# Patient Record
Sex: MALE | Race: WHITE | NOT HISPANIC OR LATINO | Employment: FULL TIME | URBAN - METROPOLITAN AREA
[De-identification: names, ages, dates, MRNs, and addresses within clinical notes are randomized per-mention and may not be internally consistent; named-entity substitution may affect disease eponyms.]

---

## 2017-07-05 ENCOUNTER — ALLSCRIPTS OFFICE VISIT (OUTPATIENT)
Dept: OTHER | Facility: OTHER | Age: 37
End: 2017-07-05

## 2017-07-31 ENCOUNTER — ALLSCRIPTS OFFICE VISIT (OUTPATIENT)
Dept: OTHER | Facility: OTHER | Age: 37
End: 2017-07-31

## 2017-08-28 ENCOUNTER — ALLSCRIPTS OFFICE VISIT (OUTPATIENT)
Dept: OTHER | Facility: OTHER | Age: 37
End: 2017-08-28

## 2017-09-22 ENCOUNTER — GENERIC CONVERSION - ENCOUNTER (OUTPATIENT)
Dept: OTHER | Facility: OTHER | Age: 37
End: 2017-09-22

## 2017-09-22 DIAGNOSIS — M54.12 RADICULOPATHY OF CERVICAL REGION: ICD-10-CM

## 2017-10-05 ENCOUNTER — GENERIC CONVERSION - ENCOUNTER (OUTPATIENT)
Dept: OTHER | Facility: OTHER | Age: 37
End: 2017-10-05

## 2017-10-12 ENCOUNTER — APPOINTMENT (OUTPATIENT)
Dept: PHYSICAL THERAPY | Age: 37
End: 2017-10-12
Payer: COMMERCIAL

## 2017-10-12 ENCOUNTER — GENERIC CONVERSION - ENCOUNTER (OUTPATIENT)
Dept: OTHER | Facility: OTHER | Age: 37
End: 2017-10-12

## 2017-10-12 DIAGNOSIS — M54.12 RADICULOPATHY OF CERVICAL REGION: ICD-10-CM

## 2017-10-12 PROCEDURE — G8978 MOBILITY CURRENT STATUS: HCPCS

## 2017-10-12 PROCEDURE — G8979 MOBILITY GOAL STATUS: HCPCS

## 2017-10-12 PROCEDURE — 97140 MANUAL THERAPY 1/> REGIONS: CPT

## 2017-10-12 PROCEDURE — 97162 PT EVAL MOD COMPLEX 30 MIN: CPT

## 2017-10-12 PROCEDURE — 97012 MECHANICAL TRACTION THERAPY: CPT

## 2017-10-16 ENCOUNTER — GENERIC CONVERSION - ENCOUNTER (OUTPATIENT)
Dept: OTHER | Facility: OTHER | Age: 37
End: 2017-10-16

## 2017-11-14 ENCOUNTER — GENERIC CONVERSION - ENCOUNTER (OUTPATIENT)
Dept: OTHER | Facility: OTHER | Age: 37
End: 2017-11-14

## 2017-11-30 ENCOUNTER — ALLSCRIPTS OFFICE VISIT (OUTPATIENT)
Dept: OTHER | Facility: OTHER | Age: 37
End: 2017-11-30

## 2018-01-08 ENCOUNTER — ALLSCRIPTS OFFICE VISIT (OUTPATIENT)
Dept: OTHER | Facility: OTHER | Age: 38
End: 2018-01-08

## 2018-01-08 ENCOUNTER — TRANSCRIBE ORDERS (OUTPATIENT)
Dept: ADMINISTRATIVE | Facility: HOSPITAL | Age: 38
End: 2018-01-08

## 2018-01-08 DIAGNOSIS — M54.12 BRACHIAL NEURITIS: Primary | ICD-10-CM

## 2018-01-09 ENCOUNTER — TRANSCRIBE ORDERS (OUTPATIENT)
Dept: ADMINISTRATIVE | Facility: HOSPITAL | Age: 38
End: 2018-01-09

## 2018-01-09 DIAGNOSIS — M77.12 LEFT LATERAL EPICONDYLITIS: Primary | ICD-10-CM

## 2018-01-09 NOTE — PROGRESS NOTES
Assessment   1  Cervical radiculopathy (723 4) (M54 12)   2  Left lateral epicondylitis (726 32) (M77 12)    Plan   Cervical radiculopathy    · * MRI CERVICAL SPINE WO CONTRAST; Status:Need Information - Financial    Authorization; Requested [id number];    · *1 -  [facility]  *  Status: Active  Requested for:    70CYS3471  Care Summary provided  : Yes  Left lateral epicondylitis    · 1 - Artem SLATER, [place] Deluna  (Orthopedic Surgery) Co-Management  *  Status: Active     Requested for: 25QZI7444  Care Summary provided  : Yes   · Follow Up if Not Better Evaluation and Treatment  Follow-up  Status: Complete  Done:    24MXM9201 04:14PM    Chief Complaint   pain in left elbow not getting better      History of Present Illness   Pt  is here for left elbow pain  Patient with history of the cervical degenerative disc disease at C5-6  Right arm pain has improved  Patient pain worse with gripping things  No chest pain or shortness of breath  Patient use meloxicam and piroxicam without significant improved  patient did have x-ray at chiropractor for cervical spine  Review of Systems        Constitutional: No fever or chills, feels well, no tiredness, no recent weight gain or weight loss  Eyes: No complaints of eye pain, no red eyes, no discharge from eyes, no itchy eyes  ENT: no complaints of earache, no hearing loss, no nosebleeds, no nasal discharge, no sore throat, no hoarseness  Cardiovascular: No complaints of slow heart rate, no fast heart rate, no chest pain, no palpitations, no leg claudication, no lower extremity  Respiratory: No complaints of shortness of breath, no wheezing, no cough, no SOB on exertion, no orthopnea or PND  Gastrointestinal: No complaints of abdominal pain, no constipation, no nausea or vomiting, no diarrhea or bloody stools        Genitourinary: No complaints of dysuria, no incontinence, no hesitancy, no nocturia, no genital lesion, no testicular pain  Musculoskeletal: as noted in HPI  Integumentary: No complaints of skin rash or skin lesions, no itching, no skin wound, no dry skin  Neurological: No compliants of headache, no confusion, no convulsions, no numbness or tingling, no dizziness or fainting, no limb weakness, no difficulty walking  Psychiatric: anxiety, but-- as noted in HPI  Endocrine: No complaints of proptosis, no hot flashes, no muscle weakness, no erectile dysfunction, no deepening of the voice, no feelings of weakness  Hematologic/Lymphatic: No complaints of swollen glands, no swollen glands in the neck, does not bleed easily, no easy bruising  Active Problems   1  Achilles tendinitis of left lower extremity (726 71) (M76 62)   2  Acute intractable headache, unspecified headache type (784 0) (R51)   3  Acute upper respiratory infection (465 9) (J06 9)   4  Allergic contact dermatitis due to other agents (692 89) (L23 89)   5  Allergic sinusitis (477 9) (J30 9)   6  Anxiety (300 00) (F41 9)   7  Cervical pain (723 1) (M54 2)   8  Cervical radiculopathy (723 4) (M54 12)   9  Chest pain (786 50) (R07 9)   10  Cyst of joint of shoulder (719 81) (M25 819)   11  Deep Pain In The Right Hip (719 45)   12  Diarrhea (787 91) (R19 7)   13  Dizziness (780 4) (R42)   14  Elbow pain, chronic, left (719 42,338 29) (M25 522,G89 29)   15  Esophageal reflux (530 81) (K21 9)   16  Infectious diarrhea (009 2) (A09)   17  Influenza (487 1) (J11 1)   18  Insomnia (780 52) (G47 00)   19  Knee sprain (844 9) (S83 90XA)   20  Lumbar radiculopathy (724 4) (M54 16)   21  Muscle ache (729 1) (M79 1)   22  Obesity (278 00) (E66 9)   23  Palpitation (785 1) (R00 2)   24  Quadriceps tendinitis (727 09) (M76 899)   25  Right elbow pain (719 42) (M25 521)   26  Right knee pain (719 46) (M25 561)   27  Right lateral epicondylitis (726 32) (M77 11)   28  Shoulder pain (719 41) (M25 519)   29  Sinusitis (473 9) (J32 9)   30  Swelling of hand (729 81) (M79 89)   31  Tinea versicolor (111 0) (B36 0)   32  Triceps tendinitis (726 39) (M77 8)   33  Vertigo (780 4) (R42)   34  Visual disturbances (368 9) (H53 9)    Past Medical History   1  Acute maxillary sinusitis (461 0) (J01 00)   2  Acute upper respiratory infection (465 9) (J06 9)   3  History of Epilepsy And Recurrent Seizures (345 90)   4  Viral gastroenteritis (008 8) (A08 4)     The active problems and past medical history were reviewed and updated today  Surgical History   1  History of Primary Repair Of Knee Ligament Cruciate Anterior    Family History   Mother    1  Family history of Liver Transplant    Social History    · Being A Social Drinker   · Never A Smoker    Current Meds    1  BD Pen Needle Liliana U/F 32G X 4 MM Miscellaneous; USE 1 NEEDLE FOR EACH     INJECTION OF INSULIN  (DX: E11 9); Therapy: 27OOW8198 to (Last Rx:59Exp1168)  Requested for: 40Riz2104 Ordered   2  PredniSONE 10 MG Oral Tablet; Take 6 pills x 3 days, 4 pills x 3 days, 2 pills x 3     days, then 1 pill x 3 days; Therapy: 81DZP0066 to (21 )  Requested for: 87LVB2337; Last     Rx:56Cnf8581 Ordered   3  Saxenda 18 MG/3ML Subcutaneous Solution Pen-injector; TAKE 0 6 mg DAILY FOR ONE     WEEK, 1 2 mg DAILY FOR ONE WEEK, 1 8 mg DAILY FOR ONE WEEK,     2 4 mg DAILY FOR ONE WEEK, 3 mg DAILY AS DIRECTED; Therapy: 87XGO4291 to (Evaluate:01Jan2018); Last Rx:95Yrd0257 Ordered     The medication list was reviewed and updated today  Allergies   1  Advil TABS   2  Motrin IB TABS    Vitals   Vital Signs    Recorded: 41AJV8137 84:01OJ   Systolic 297   Diastolic 70   Height 5 ft 4 75 in   Weight 212 lb 0 4 oz   BMI Calculated 35 56   BSA Calculated 2 02     Physical Exam        Constitutional      General appearance: No acute distress, well appearing and well nourished         Musculoskeletal      Gait and station: Normal        Digits and nails: Abnormal  -- patient with numbness down left arm with extension of neck  Inspection/palpation of joints, bones, and muscles: Abnormal  -- Left lateral elbow pain  Skin      Skin and subcutaneous tissue: Normal without rashes or lesions         Neurologic      Sensation: Abnormal           Signatures    Electronically signed by : Nilda Damico DO; Jan 8 2018  4:15PM EST                       (Author)

## 2018-01-10 NOTE — MISCELLANEOUS
Dear Zak Desouza,    Please call our office at your earliest convenience to discuss instructions from the doctor  Thank you        Electronically signed Alyce Richmond   Oct 12 2017  9:45AM EST Author

## 2018-01-12 VITALS
DIASTOLIC BLOOD PRESSURE: 80 MMHG | WEIGHT: 213 LBS | TEMPERATURE: 98.3 F | BODY MASS INDEX: 34.23 KG/M2 | HEIGHT: 66 IN | SYSTOLIC BLOOD PRESSURE: 118 MMHG

## 2018-01-13 VITALS
TEMPERATURE: 98.8 F | HEIGHT: 66 IN | DIASTOLIC BLOOD PRESSURE: 76 MMHG | SYSTOLIC BLOOD PRESSURE: 124 MMHG | WEIGHT: 212.25 LBS | BODY MASS INDEX: 34.11 KG/M2

## 2018-01-13 NOTE — MISCELLANEOUS
Dear Samuel Atwood,     This letter is for the purpose of a reminder  On 9/22/17, our doctor(s) ordered an MRI Cervical Spine  This test typically requires an authorization through the health insurance  On  9/29/17 I had spoken with you on the phone and explained this as well  As of today, 10/5/17, we have not been notified of a date of service or place of service  Please notify our office with this information so we can initiate an authorization  through the insurance  Thanks in advance,  [facility]'s [address] [place] [address] Staff  Gaviota JUAREZ                Electronically signed by:Gaviota Barnard   Oct  5 2017  4:04PM EST Author

## 2018-01-14 VITALS
BODY MASS INDEX: 35.2 KG/M2 | DIASTOLIC BLOOD PRESSURE: 72 MMHG | TEMPERATURE: 98.8 F | SYSTOLIC BLOOD PRESSURE: 124 MMHG | WEIGHT: 219 LBS | HEIGHT: 66 IN

## 2018-01-15 NOTE — MISCELLANEOUS
Message  Return to work or school:   Juliane Anthony is under my professional care   He was seen in my office on 11/30/17   He is able to return to work on  12/4/17            Signatures   Electronically signed by : DAVIS Henry ; Nov 30 2017  9:33AM EST                       (Author)

## 2018-01-16 ENCOUNTER — CLINICAL SUPPORT (OUTPATIENT)
Dept: OTHER | Facility: OTHER | Age: 38
End: 2018-01-16

## 2018-01-16 DIAGNOSIS — M54.12 RADICULOPATHY OF CERVICAL REGION: ICD-10-CM

## 2018-01-17 NOTE — CONSULTS
Assessment   Assessed    1  Myofascial pain (729 1) (M79 1)   2  Cervical radiculopathy (723 4) (M54 12)   3  Left lateral epicondylitis (726 32) (M77 12)   4  Subacromial bursitis of left shoulder joint (726 19) (M75 52)   5  Impingement syndrome of left shoulder (726 2) (M75 42)    Plan   Cervical radiculopathy    · *1 - [facility]  Consult: evaluate and treat    please evaluate if pt is a candidate for Pamela baseed therapy  Status: Active     Requested for: 03DLN6022   Ordered; For: Cervical radiculopathy; Ordered By: Mary Ann Carballo Performed:  Due: 81MPP7686  Care Summary provided  : Yes  Myofascial pain    · TiZANidine HCl - 2 MG Oral Tablet; Take 1-2 tablets every 8 hours when necessary   Rx By: Mary Ann Carballo; Dispense: 30 Days ; #:180 Tablet; Refill: 1;For: Myofascial pain; KASANDRA = N; Verified Transmission to [id number] Last Updated By: System, [employer]; 1/16/2018 9:28:20 AM        75-year-old male presenting for initial consultation regarding neck pain with radicular symptoms into the left upper extremity including weakness and numbness  The patient does have a history of left shoulder surgery x2 and does have some evidence of subacromial bursitis and possible impingement syndrome, however the patient also has a positive Spurling's and the neuropathic symptoms of his left upper extremity may be radicular in nature as well  The patient also has a component of left lateral epicondylitis as he is tender to palpation over this region  The patient has only done 1 visit of physical therapy and had stopped going because the therapist was only doing traction and he did did not think that this was going to help his constellation of symptoms  He was ordered Lyrica 50 mg b i d  by his PCP, however he has not started this yet  He is allergic to NSAIDs  1  I will order Pamela based physical therapy for the patient's potential radicular symptoms     2   If he fails conservative therapy we may consider a an MRI of the cervical spine     3  I will trial tizanidine 2 mg 1-2 tablets q 8 hours p r n  for his myofascial pain     4  The patient was encouraged to try Lyrica 50 mg q h s  x1 week and then titrate up to b i d  for his neuropathic symptoms     5  the patient may benefit from left subacromial bursa injection or cervical epidural steroid injections, however will await to see his response of physical therapy and possibly imaging of the cervical spine     6  The patient did not respond to previous injections for tennis elbow     7  I will follow up the patient in 8 weeks       Discussion/Summary   The patient has the current Goals: Reduced pain and improved function  The patent has the current Barriers: None  Patient is able to Self-Care  Possible side effects of new medications were reviewed with the patient/guardian today  The treatment plan was reviewed with the patient/guardian  The patient/guardian understands and agrees with the treatment plan    The patient was counseled regarding diagnostic results,-- instructions for management,-- risk factor reductions,-- prognosis,-- patient and family education,-- impressions,-- risks and benefits of treatment options-- and-- importance of compliance with treatment  total time of encounter was 30 minutes  Self Referrals: No      Chief Complaint   Chief Complaints    1  Neck Pain  Neck and arm pain      History of Present Illness   24-year-old male presenting for initial consultation regarding neck pain that radiates into the left upper extremity no specific dermatomal fashion with associated numbness and some subjective weakness  The pain in his neck has been longstanding, however over the past few months has progressively worsened and has developed a left upper extremity symptoms  The patient states that he initially also had right upper extremity symptoms which have resolved   He denies any bladder or bowel incontinence or balance issues  He had 1 visit of physical therapy, however there were only doing traction and he did not see this is beneficial, therefore he stopped going  He was ordered Lyrica by his PCP, however he has not picked this up yet  He is unable to take NSAIDs secondary to allergy an MRI of the cervical spine was ordered, however insurance had denied this  patient rates his pain an 8/10 on the pain is nearly constant  The pain does not follow any particular pattern throughout the day  The pain is described as burning, numbness, and sharp  The pain is worse with bending and twisting his neck  The pain is alleviated with nothing in particular  He did get some relief with traction and chiropractic manipulation in the past  have personally reviewed and/or updated the patient's past medical history, past surgical history, family history, social history, allergies, and vital signs today  Other than as stated above, the patient denies any interval changes in medications, medical condition, mental condition, symptoms, or allergies since the last office visit  Referring physician is  Dr Anish Sadler      Primary Care physician is  [facility] presents with complaints of constant episodes of bilateral posterior neck pain, described as sharp and burning, radiating to the left trapezius, left shoulder and left arm  On a scale of 1 to 10, the patient rates the pain as 5  Review of Systems        Constitutional: no fever,-- no recent weight gain-- and-- no recent weight loss  Eyes: no double vision-- and-- no blurry vision  Cardiovascular: no chest pain,-- no palpitations-- and-- no lower extremity edema  Respiratory: no complaints of shortness of breath-- and-- no wheezing  Musculoskeletal: no difficulty walking,-- no muscle weakness,-- no joint stiffness,-- no joint swelling,-- no limb swelling,-- no pain in extremity-- and-- no decreased range of motion        Neurological: no dizziness,-- no difficulty swallowing,-- no memory loss,-- no loss of consciousness-- and-- no seizures  Gastrointestinal: no nausea,-- no vomiting,-- no constipation-- and-- no diarrhea  Genitourinary: no difficulty initiating urine stream,-- no genital pain-- and-- no frequent urination  Integumentary: no complaints of skin rash  Psychiatric: no depression  Endocrine: no excessive thirst,-- no adrenal disease,-- no hypothyroidism-- and-- no hyperthyroidism  Hematologic/Lymphatic: no tendency for easy bruising-- and-- no tendency for easy bleeding  ROS reviewed  Active Problems   Problems    1  Achilles tendinitis of left lower extremity (726 71) (M76 62)   2  Acute intractable headache, unspecified headache type (784 0) (R51)   3  Acute upper respiratory infection (465 9) (J06 9)   4  Allergic contact dermatitis due to other agents (692 89) (L23 89)   5  Allergic sinusitis (477 9) (J30 9)   6  Anxiety (300 00) (F41 9)   7  Cervical pain (723 1) (M54 2)   8  Cervical radiculopathy (723 4) (M54 12)   9  Chest pain (786 50) (R07 9)   10  Cyst of joint of shoulder (719 81) (M25 819)   11  Deep Pain In The Right Hip (719 45)   12  Diarrhea (787 91) (R19 7)   13  Dizziness (780 4) (R42)   14  Elbow pain, chronic, left (719 42,338 29) (M25 522,G89 29)   15  Esophageal reflux (530 81) (K21 9)   16  Infectious diarrhea (009 2) (A09)   17  Influenza (487 1) (J11 1)   18  Insomnia (780 52) (G47 00)   19  Knee sprain (844 9) (S83 90XA)   20  Left lateral epicondylitis (726 32) (M77 12)   21  Lumbar radiculopathy (724 4) (M54 16)   22  Muscle ache (729 1) (M79 1)   23  Obesity (278 00) (E66 9)   24  Palpitation (785 1) (R00 2)   25  Quadriceps tendinitis (727 09) (M76 899)   26  Right elbow pain (719 42) (M25 521)   27  Right knee pain (719 46) (M25 561)   28  Right lateral epicondylitis (726 32) (M77 11)   29  Shoulder pain (719 41) (M25 519)   30  Sinusitis (473 9) (J32 9)   31   Swelling of hand (729 81) (M79 89)   32  Tinea versicolor (111 0) (B36 0)   33  Triceps tendinitis (726 39) (M77 8)   34  Vertigo (780 4) (R42)   35  Visual disturbances (368 9) (H53 9)    Past Medical History   Problems    1  Acute maxillary sinusitis (461 0) (J01 00)   2  Acute upper respiratory infection (465 9) (J06 9)   3  History of Epilepsy And Recurrent Seizures (345 90)   4  Viral gastroenteritis (008 8) (A08 4)    Surgical History   Problems    1  History of Primary Repair Of Knee Ligament Cruciate Anterior    Family History   Mother    1  Family history of Liver Transplant    Social History   Problems    · Being A Social Drinker   · Never A Smoker    Current Meds    1  Lyrica 50 MG Oral Capsule; TAKE 1 CAPSULE TWICE DAILY; Therapy: 93SDD1525 to (Evaluate:08Apr2018); Last Rx:08Jan2018 Ordered    Allergies   Medication    1  Advil TABS   2  Motrin IB TABS    Vitals   Vital Signs    Recorded: 75ULK8718 08:55AM   Temperature 97 6 F   Heart Rate 68   Systolic 086   Diastolic 82   Height 5 ft 4 75 in   Weight 215 lb 4 oz   BMI Calculated 36 1   BSA Calculated 2 04   Pain Scale 5     Physical Exam        Constitutional      General appearance: Well developed, well nourished, alert, in no distress, non-toxic and no overt pain behavior  Eyes      Sclera: anicteric      HEENT      Hearing grossly intact  Neck      Neck: Supple, symmetric, trachea midline, no masses  Pulmonary      Respiratory effort: Even and unlabored  Abdomen      Abdomen: Soft, non-tender, non-distended  Skin      Skin and subcutaneous tissue: Normal without rashes or lesions, well hydrated  Psychiatric      Mood and affect: Mood and affect appropriate  Neurologic      the muscle tone was normal      Musculoskeletal      Gait and station: Normal        Joint Exam: -- Positive empty can and Neer's test on the left  Negative modified Griffith on the left   Tenderness to palpation over the left lateral epicondyle of the elbow       Cervical Spine examination demonstrates Cervical Spine:      Appearance: Normal       Tenderness: left paraspinal tenderness-- and-- left trapezius muscle  Palpatory findings include left-sided muscle spasms  Cervical Sensory Exam:  intact to light touch and pinprick in the upper extremities  ROM: Full except as noted: Extension was restricted-- and-- was painful       hand strength was normal on the right side  Left hand  was 4/5        wrist strength was normal bilaterally  elbow strength was normal bilaterally  shoulder strength was normal bilaterally  Evaluation of Muscle Stretch Reflexes on the right side demonstrates negative Stewart's Test right upper extremity-- and-- negative right ankle clonus--   muscle stretch reflexes equal and symmetric in the right upper and lower limbs  Evaluation of Muscle Stretch Reflexes on the left side demonstrates negative left ankle clonus, but-- muscle stretch reflexes equal and symmetric in the left upper and lower limbs-- and-- negative Stewart's Test left upper extremity  Special Tests: positive Spurling's Maneuver to the left, but-- negative Spurling's Maneuver to the right  Results/Data   Procedure Flowsheet 75ULI6601 08:55AM       Test Name Result Flag Reference   Oswestry Score 26           Results        Radiology:  X-Ray Cervical spine dated 4/6/2015     No fracture or subluxation  Slight reversal of cervical lordosis  C5-6 endplate change with loss of disc height noted  Mild to moderate foraminal stenosis at C5-6 and C6-7 secondary to   uncovertebral and facet hypertrophy  On the left there is mild to   moderate foraminal encroachment at C5-6 and C6-7, again secondary to   uncovertebral and facet hypertrophy  The prevertebral soft tissues are within normal limits  The lung apices are intact  IMPRESSION- Degenerative change and foraminal encroachment as above     No evidence of acute osseous abnormality  MRI:  MR Left shoulder  degenerative change of the glenohumeral joint with significant cartilage loss and irregularity on the glenoid side of the joint most prominent and inferiorly  Degenerative subchondral cysts are noted along the posterior and inferior aspect of the glenoid as well     suggest partial undersurface tears of the supra and infraspinatus tendons  loose bodies as well as loose bodies within the biceps tendon sheath  hypertrophic change of the AC joint        Signatures    Electronically signed by : Hafsa Duncan DO; Jan 16 2018  9:41AM EST                       (Author)

## 2018-01-18 NOTE — PROGRESS NOTES
Assessment    1  Allergic contact dermatitis due to other agents (765 89) (L23 89)    Plan  Allergic contact dermatitis due to other agents    · HydrOXYzine HCl - 25 MG Oral Tablet; Take 1 tablet 3 times daily as needed   · PredniSONE 10 MG Oral Tablet; Take 6 pills x 3 days, 4 pills x 3 days, 2 pills x  3 days, then 1 pill x 3 days   · Follow Up if Not Better Evaluation and Treatment  Follow-up  Status: Complete  Done:  20Jan2016 10:45AM    Chief Complaint  PT PRESENTS FOR A RASH THAT IS SPREADING FROM BACK TO HIS ARMS, LEGS AND ABD AREA      History of Present Illness  HPI: Pt  is here for rash on abdomen, arms and legs  he with pruritus  No fever noted  No other person in the family which has rash  Patient stopped Adipex-P without improvement  Patient stopped new shampoo over the past 3 days without improvement  No other new creams or lotions  Patient with some new one in detergent and fabric softener  no new foods  Review of Systems    Constitutional: no fever or chills, feels well, no tiredness, no recent weight loss or weight gain  ENT: no complaints of earache, no loss of hearing, no nosebleeds or nasal discharge, no sore throat or hoarseness  Cardiovascular: no complaints of slow or fast heart rate, no chest pain, no palpitations, no leg claudication or lower extremity edema  Respiratory: no complaints of shortness of breath, no wheezing or cough, no dyspnea on exertion, no orthopnea or PND  Gastrointestinal: no complaints of abdominal pain, no constipation, no nausea or vomiting, no diarrhea or bloody stools  Genitourinary: no complaints of dysuria or incontinence, no hesitancy, no nocturia, no genital lesion, no inadequacy of penile erection  Musculoskeletal: no complaints of arthralgia, no myalgia, no joint swelling or stiffness, no limb pain or swelling  Integumentary: as noted in HPI     Neurological: no complaints of headache, no confusion, no numbness or tingling, no dizziness or fainting  Active Problems    1  Achilles tendinitis of left lower extremity (726 71) (M76 62)   2  Anxiety (300 00) (F41 9)   3  Deep Pain In The Right Hip (719 45)   4  Esophageal reflux (530 81) (K21 9)   5  Infectious diarrhea (009 2) (A09)   6  Influenza (487 1) (J11 1)   7  Insomnia (780 52) (G47 00)   8  Knee sprain (844 9) (S83 90XA)   9  Lumbar radiculopathy (724 4) (M54 16)   10  Muscle ache (729 1) (M79 1)   11  Obesity (278 00) (E66 9)   12  Right knee pain (719 46) (M25 561)   13  Sinusitis (473 9) (J32 9)   14  Swelling of hand (729 81) (M79 89)   15  Tinea versicolor (111 0) (B36 0)    Past Medical History    1  Acute maxillary sinusitis (461 0) (J01 00)   2  Acute upper respiratory infection (465 9) (J06 9)   3  History of Epilepsy And Recurrent Seizures (345 90)   4  Viral gastroenteritis (008 8) (A08 4)    Family History    1  Family history of Liver Transplant    Social History    · Being A Social Drinker   · Never A Smoker    Surgical History    1  History of Primary Repair Of Knee Ligament Cruciate Anterior    Current Meds   1  Phentermine HCl - 37 5 MG Oral Tablet; TAKE 1 TABLET DAILY; Therapy: [id number] to (Evaluate:20Mar2016); Last Rx:[id number] Ordered   2  Piroxicam 20 MG Oral Capsule; TAKE 1 CAPSULE DAILY WITH A MEAL; Therapy: [id number] to (Evaluate:20Mar2016)  Requested for: 74SBP6663; Last   Rx:[id number] Ordered    Allergies    1  Advil TABS   2  Motrin IB TABS    Vitals   Recorded: 31VUG4162 96:47DR   Systolic 734, LUE, Sitting   Diastolic 70, LUE, Sitting   Weight 209 lb    BMI Calculated 35 6   BSA Calculated 2     Physical Exam    Constitutional   General appearance: No acute distress, well appearing and well nourished  Eyes   Conjunctiva and lids: No swelling, erythema, or discharge  Pupils and irises: Equal, round and reactive to light      Ears, Nose, Mouth, and Throat   External inspection of ears and nose: Normal     Otoscopic examination: Tympanic membrance translucent with normal light reflex  Canals patent without erythema  Nasal mucosa, septum, and turbinates: Normal without edema or erythema  Oropharynx: Normal with no erythema, edema, exudate or lesions  Pulmonary   Respiratory effort: No increased work of breathing or signs of respiratory distress  Auscultation of lungs: Clear to auscultation, equal breath sounds bilaterally, no wheezes, no rales, no rhonci  Cardiovascular   Palpation of heart: Normal PMI, no thrills  Auscultation of heart: Normal rate and rhythm, normal S1 and S2, without murmurs  Examination of extremities for edema and/or varicosities: Normal     Lymphatic   Palpation of lymph nodes in neck: No lymphadenopathy  Musculoskeletal   Gait and station: Normal     Digits and nails: Normal without clubbing or cyanosis  Inspection/palpation of joints, bones, and muscles: Abnormal   Right knee no pain over lateral joint line the patient does have some medial joint line discomfort  No MCL or LCL weakness  It [facility]  Skin   Skin and subcutaneous tissue: Abnormal   Small red erythematous bumps on the torso          Signatures   Electronically signed by : Erika See DO; Jan 20 2016 10:48AM EST                       (Author)

## 2018-01-22 VITALS
BODY MASS INDEX: 34.32 KG/M2 | DIASTOLIC BLOOD PRESSURE: 90 MMHG | WEIGHT: 206 LBS | HEIGHT: 65 IN | SYSTOLIC BLOOD PRESSURE: 130 MMHG

## 2018-01-22 VITALS — TEMPERATURE: 97.6 F | DIASTOLIC BLOOD PRESSURE: 68 MMHG | SYSTOLIC BLOOD PRESSURE: 122 MMHG

## 2018-01-23 VITALS
BODY MASS INDEX: 35.33 KG/M2 | HEIGHT: 65 IN | DIASTOLIC BLOOD PRESSURE: 70 MMHG | SYSTOLIC BLOOD PRESSURE: 110 MMHG | WEIGHT: 212.03 LBS

## 2018-01-23 VITALS
TEMPERATURE: 97.6 F | SYSTOLIC BLOOD PRESSURE: 116 MMHG | DIASTOLIC BLOOD PRESSURE: 82 MMHG | BODY MASS INDEX: 35.86 KG/M2 | WEIGHT: 215.25 LBS | HEIGHT: 65 IN | HEART RATE: 68 BPM

## 2018-02-01 ENCOUNTER — TELEPHONE (OUTPATIENT)
Dept: PAIN MEDICINE | Facility: MEDICAL CENTER | Age: 38
End: 2018-02-01

## 2018-02-01 DIAGNOSIS — M54.12 CERVICAL RADICULOPATHY: Primary | ICD-10-CM

## 2018-02-01 RX ORDER — TIZANIDINE 2 MG/1
1-2 TABLET ORAL EVERY 8 HOURS
COMMUNITY
Start: 2018-01-16 | End: 2018-03-01 | Stop reason: SDUPTHER

## 2018-02-01 RX ORDER — PREGABALIN 50 MG/1
1 CAPSULE ORAL 2 TIMES DAILY
COMMUNITY
Start: 2018-01-08 | End: 2018-03-01 | Stop reason: SDUPTHER

## 2018-02-01 NOTE — TELEPHONE ENCOUNTER
Pt returned call and can be reached at [phone]  He said he did not get it because he did not recognize the phone # the call came from

## 2018-02-01 NOTE — TELEPHONE ENCOUNTER
Informed pt of above, pt aware and he is going to try and call his insurance company to clarify  He will continue with PT and tell them about the pain he is experiencing

## 2018-02-01 NOTE — TELEPHONE ENCOUNTER
Phone call from patient stating that since starting physical therapy he has had increased pain and numbness in his neck  Please call patient at [phone]

## 2018-02-14 NOTE — TELEPHONE ENCOUNTER
[facility]- patient called back stating he stopped going to PT because it made his symptoms worse  Stated he needs to s/w someone about getting a new request for an MRI   C/b [phone]

## 2018-02-23 ENCOUNTER — TELEPHONE (OUTPATIENT)
Dept: FAMILY MEDICINE CLINIC | Facility: CLINIC | Age: 38
End: 2018-02-23

## 2018-02-23 DIAGNOSIS — M54.2 NECK PAIN: Primary | ICD-10-CM

## 2018-02-23 NOTE — TELEPHONE ENCOUNTER
Pt's insurance - [employer] is not willing to pay for the Mri at the moment  Their reason was he needs to try 4 weeks of PT in order for approval  He has seen pain management and did 1-2 visits of PT but the pain and numbness has worsened since doing those sessions  Pt has tried to contact pain management but was unsuccessful in being able to reach anybody  Do you have any other suggestions for the pt? Would you like a new auth to be initiated considering those new details?

## 2018-02-23 NOTE — TELEPHONE ENCOUNTER
They can try and get the MRI approved, however I highly doubt they will approve it as the patient has not done their prerequisite of 8 sessions of physical therapy or 4 weeks of physical therapy  If they denied I will send the patient for aquatic therapy to see if he can tolerate this better    Order was placed for aquatic therapy

## 2018-02-23 NOTE — TELEPHONE ENCOUNTER
Pt is calling and saying that he needs to get an mri done he did 1 day of physical therapy and stated that it makes his pain worse and says that someone was supposed to re submit to get his mri done   Please call pt back at [phone]

## 2018-02-27 NOTE — TELEPHONE ENCOUNTER
Pt called back today stating he still has not heard from Ortho  I provided the pt with [facility] location for Ortho for him to call and try to be scheduled  I advised the order is in his chart that they can reference  Pt noted his neck pain is really bad

## 2018-02-28 NOTE — TELEPHONE ENCOUNTER
I do not see an MRI ordered by you, can you place that order? The central auth team will need the order before they will attempt to obtain auth

## 2018-03-01 ENCOUNTER — OFFICE VISIT (OUTPATIENT)
Dept: FAMILY MEDICINE CLINIC | Facility: CLINIC | Age: 38
End: 2018-03-01
Payer: COMMERCIAL

## 2018-03-01 VITALS
BODY MASS INDEX: 42.37 KG/M2 | HEIGHT: 60 IN | SYSTOLIC BLOOD PRESSURE: 120 MMHG | WEIGHT: 215.8 LBS | DIASTOLIC BLOOD PRESSURE: 8 MMHG

## 2018-03-01 DIAGNOSIS — M77.12 LEFT LATERAL EPICONDYLITIS: ICD-10-CM

## 2018-03-01 DIAGNOSIS — G89.29 ELBOW PAIN, CHRONIC, LEFT: ICD-10-CM

## 2018-03-01 DIAGNOSIS — E66.9 OBESITY (BMI 30-39.9): ICD-10-CM

## 2018-03-01 DIAGNOSIS — M25.522 ELBOW PAIN, CHRONIC, LEFT: ICD-10-CM

## 2018-03-01 DIAGNOSIS — M54.12 CERVICAL RADICULOPATHY: Primary | ICD-10-CM

## 2018-03-01 DIAGNOSIS — M77.11 RIGHT LATERAL EPICONDYLITIS: ICD-10-CM

## 2018-03-01 PROBLEM — M79.18 MYOFASCIAL PAIN: Status: ACTIVE | Noted: 2018-01-16

## 2018-03-01 PROBLEM — M75.42 IMPINGEMENT SYNDROME OF LEFT SHOULDER: Status: ACTIVE | Noted: 2018-01-16

## 2018-03-01 PROCEDURE — 20605 DRAIN/INJ JOINT/BURSA W/O US: CPT | Performed by: FAMILY MEDICINE

## 2018-03-01 PROCEDURE — 99214 OFFICE O/P EST MOD 30 MIN: CPT | Performed by: FAMILY MEDICINE

## 2018-03-01 NOTE — PROGRESS NOTES
Assessment/Plan:    Patient has tried multiple home exercise program for weeks along with NSAIDs and ice  Patient has gone for cervical x-ray  Patient with neurologic findings with numbness in left arm and  Radicular symptoms in the left arm with neck extension  Recommend MRI of the cervical spine  Arthrocentesis bilateral elbows after informed consent obtained  Each elbow received 1 cc of light 0 2% without epi along with 1/4 cc of Depo-Medrol 80  Alcohol use  No problem-specific Assessment & Plan notes found for this encounter  Diagnoses and all orders for this visit:    Cervical radiculopathy  -     MRI cervical spine wo contrast; Future  -     Ambulatory referral to Orthopedic Surgery; Future    Elbow pain, chronic, left  -     Ambulatory referral to Orthopedic Surgery; Future    Left lateral epicondylitis  -     Ambulatory referral to Orthopedic Surgery; Future    Right lateral epicondylitis  -     Ambulatory referral to Orthopedic Surgery; Future    Obesity (BMI 30-39 9)  -     Naltrexone-Bupropion HCl ER (CONTRAVE) 8-90 MG TB12; Take 1 tablet in the morning for 1 week then 1 tablet twice a day for 1 week then 2 tablets in the morning and 1 tablet at night for 1 week then 2 tablets twice daily  Subjective:      Patient ID: Lamar Orf is a 34 y o  male  Patient is here to follow-up on left cervical radiculopathy as well as bilateral elbow pain  The patient notices crepitus in his neck along with radicular symptoms down his left arm when his neck is in certain positions  Patient still with bilateral elbow discomfort  Patient also with some numbness in left arm  The following portions of the patient's history were reviewed and updated as appropriate: allergies, current medications, past family history, past medical history, past social history, past surgical history and problem list     Review of Systems   Constitutional: Negative  HENT: Negative  Eyes: Negative  Respiratory: Negative  Cardiovascular: Negative  Gastrointestinal: Negative  Endocrine: Negative  Genitourinary: Negative  Musculoskeletal: Positive for arthralgias, neck pain and neck stiffness  Skin: Negative  Allergic/Immunologic: Negative  Neurological: Positive for numbness  Hematological: Negative  Psychiatric/Behavioral: Negative  Objective:      BP (!) 120/8 (BP Location: Left arm, Patient Position: Sitting, Cuff Size: Standard)   Ht 1' 7 74" (0 501 m)   Wt 97 9 kg (215 lb 12 8 oz)    37 kg/m²          Physical Exam   Constitutional: He is oriented to person, place, and time  He appears well-developed and well-nourished  No distress  HENT:   Head: Normocephalic  Right Ear: External ear normal    Left Ear: External ear normal    Mouth/Throat: Oropharynx is clear and moist  No oropharyngeal exudate  Eyes: EOM are normal  Pupils are equal, round, and reactive to light  Right eye exhibits no discharge  Left eye exhibits no discharge  No scleral icterus  Neck: Normal range of motion  Neck supple  No thyromegaly present  Cardiovascular: Normal rate, regular rhythm, normal heart sounds and intact distal pulses  Exam reveals no gallop and no friction rub  No murmur heard  Pulmonary/Chest: Effort normal and breath sounds normal  No respiratory distress  He has no wheezes  He has no rales  He exhibits no tenderness  Abdominal: Soft  Bowel sounds are normal  He exhibits no distension  There is no tenderness  There is no rebound and no guarding  Musculoskeletal: Normal range of motion  He exhibits tenderness  He exhibits no edema  Lymphadenopathy:     He has no cervical adenopathy  Neurological: He is alert and oriented to person, place, and time  No cranial nerve deficit  He exhibits normal muscle tone  Coordination normal    Radicular symptoms down left arm with neck extension  Decreased sensation left arm   Skin: Skin is warm and dry   No rash noted  He is not diaphoretic  No erythema  No pallor  Psychiatric: He has a normal mood and affect  His behavior is normal  Judgment and thought content normal    Nursing note and vitals reviewed      Medium joint arthrocentesis  Date/Time: 3/1/2018 5:14 PM  Consent given by: patient  Site marked: site marked  Timeout: Immediately prior to procedure a time out was called to verify the correct patient, procedure, equipment, support staff and site/side marked as required   Supporting Documentation  Indications: pain   Procedure Details  Location: elbow - R elbow  Preparation: Patient was prepped and draped in the usual sterile fashion  Needle size: 25 G  Ultrasound guidance: no    Patient tolerance: patient tolerated the procedure well with no immediate complications  Dressing:  Sterile dressing applied  Medium joint arthrocentesis  Date/Time: 3/1/2018 5:16 PM  Consent given by: patient  Site marked: site marked  Timeout: Immediately prior to procedure a time out was called to verify the correct patient, procedure, equipment, support staff and site/side marked as required   Supporting Documentation  Indications: pain   Procedure Details  Location: elbow - L elbow  Needle size: 25 G  Approach: anterolateral    Patient tolerance: patient tolerated the procedure well with no immediate complications  Dressing:  Sterile dressing applied

## 2018-03-02 ENCOUNTER — TELEPHONE (OUTPATIENT)
Dept: FAMILY MEDICINE CLINIC | Facility: CLINIC | Age: 38
End: 2018-03-02

## 2018-03-02 NOTE — TELEPHONE ENCOUNTER
FYI: Pt called stating he attempted to schedule an appt with ortho however they will not schedule an appt until he has had an MRI  I am currently awaiting notification of the most recent MRI being scheduled so I can try to initiate another auth

## 2018-03-09 ENCOUNTER — HOSPITAL ENCOUNTER (OUTPATIENT)
Dept: MRI IMAGING | Facility: HOSPITAL | Age: 38
Discharge: HOME | End: 2018-03-09
Attending: ANESTHESIOLOGY
Payer: COMMERCIAL

## 2018-03-09 DIAGNOSIS — M54.12 CERVICAL RADICULOPATHY: ICD-10-CM

## 2018-03-09 PROCEDURE — 72141 MRI NECK SPINE W/O DYE: CPT

## 2018-03-13 ENCOUNTER — TELEPHONE (OUTPATIENT)
Dept: PAIN MEDICINE | Facility: CLINIC | Age: 38
End: 2018-03-13

## 2018-03-13 ENCOUNTER — CLINICAL SUPPORT (OUTPATIENT)
Dept: PAIN MEDICINE | Facility: CLINIC | Age: 38
End: 2018-03-13
Payer: COMMERCIAL

## 2018-03-13 VITALS
SYSTOLIC BLOOD PRESSURE: 112 MMHG | DIASTOLIC BLOOD PRESSURE: 81 MMHG | BODY MASS INDEX: 34.52 KG/M2 | HEART RATE: 59 BPM | TEMPERATURE: 98.5 F | WEIGHT: 214.8 LBS | HEIGHT: 66 IN

## 2018-03-13 DIAGNOSIS — M48.02 CERVICAL SPINAL STENOSIS: ICD-10-CM

## 2018-03-13 DIAGNOSIS — M79.18 MYOFASCIAL PAIN: ICD-10-CM

## 2018-03-13 DIAGNOSIS — M54.12 CERVICAL RADICULOPATHY: Primary | ICD-10-CM

## 2018-03-13 DIAGNOSIS — M75.42 IMPINGEMENT SYNDROME OF LEFT SHOULDER: ICD-10-CM

## 2018-03-13 DIAGNOSIS — M50.20 CERVICAL HERNIATED DISC: ICD-10-CM

## 2018-03-13 PROCEDURE — 99214 OFFICE O/P EST MOD 30 MIN: CPT | Performed by: ANESTHESIOLOGY

## 2018-03-13 NOTE — TELEPHONE ENCOUNTER
S/w the patient and reviewed the MRI results  He stated he has an sovs today and will review in the office

## 2018-03-13 NOTE — PROGRESS NOTES
Assessment:  1  Cervical radiculopathy    2  Impingement syndrome of left shoulder    3  Myofascial pain    4  Cervical spinal stenosis    5  Cervical herniated disc        Plan:  75-year-old male returning for follow-up of neck pain and cervical radiculopathy in the left upper extremity  The patient also has a history of left shoulder surgery x2 with some evidence of subacromial bursitis and impingement syndrome  The patient did have bilateral lateral epicondylitis and had received steroid injections by his PCP with excellent relief  The patient's most recent MRI of cervical spine reveals degenerative disc disease and disc herniations at C5-6 and C6-7 resulting in central and foraminal stenosis most pronounced at C5-6 without any evidence of cord or neural compression  The patient has tried physical therapy which only made the symptoms worse  He was prescribed Lyrica, however he did not start this medication is he would like to avoid sedating medications if possible  He does take ibuprofen p r n  rarely  1   I will schedule the patient for cervical epidural steroid injection to reduce the inflammatory component of his pain  2   The patient may continue with ibuprofen p r n  and should not exceed more than 800 milligrams q 8 hours p r n   3   The patient will continue with his home exercise program  4  I will follow up the patient in 6-8 weeks after injection  5  The patient may benefit from left subacromial bursa injection in the future    Complete risks and benefits including bleeding, infection, tissue reaction, nerve injury and allergic reaction were discussed  The approach was demonstrated using models and literature was provided  Verbal and written consent was obtained  My impressions and treatment recommendations were discussed in detail with the patient who verbalized understanding and had no further questions  Discharge instructions were provided   I personally saw and examined the patient and I agree with the above discussed plan of care  No orders of the defined types were placed in this encounter  No orders of the defined types were placed in this encounter  History of Present Illness:    Rosibel Ragland is a 34 y o  male male returning for follow-up of neck pain and cervical radiculopathy into the left upper extremity, left subacromial bursitis and impingement syndrome, history of left shoulder surgery x2, and bilateral lateral epicondylitis  The patient did have bilateral lateral epicondyle steroid injections by his PCP with excellent relief  The patient did try physical therapy for his neck which only made the symptoms worse  He was prescribed Lyrica, however the patient wishes to avoid sedating medications  He is currently taking ibuprofen p r n  with mild relief  The patient did get an MRI of his cervical spine revealing degenerative disc disease and disc herniations at C5-6 and C6-7 resulting in central and foraminal stenosis most pronounced at C5-6  There is no evidence of neural or cord compression  The patient rates his pain a 4 to 8/10 depending upon what he is doing  The pain does not follow any particular pattern throughout the day  The pain is described as constant, numbness, and pins and needles  The pain is worse with bending and twisting his neck and lifting  The pain is alleviated with relaxation, sitting, and lying down  I have personally reviewed and/or updated the patient's past medical history, past surgical history, family history, social history, current medications, allergies, and vital signs today  Other than as stated above, the patient denies any interval changes in medications, medical condition, mental condition, symptoms, or allergies since the last office visit  Review of Systems:    Review of Systems   Respiratory: Negative for shortness of breath  Cardiovascular: Negative for chest pain     Gastrointestinal: Negative for constipation, diarrhea, nausea and vomiting  Musculoskeletal: Negative for arthralgias, gait problem, joint swelling and myalgias  Decreased ROM   Skin: Negative for rash  Neurological: Negative for dizziness, seizures and weakness  All other systems reviewed and are negative  Patient Active Problem List   Diagnosis    Cervical radiculopathy    Elbow pain, chronic, left    Impingement syndrome of left shoulder    Left lateral epicondylitis    Myofascial pain    Right lateral epicondylitis    Obesity (BMI 30-39  9)       Past Medical History:   Diagnosis Date    Epilepsy ([address] )     and recurrent seizures, one time age 13       Past Surgical History:   Procedure Laterality Date    ANTERIOR CRUCIATE LIGAMENT REPAIR Right        Family History   Problem Relation Age of Onset    Other Mother      liver transplant       Social History     Occupational History    Not on file  Social History Main Topics    Smoking status: Never Smoker    Smokeless tobacco: Never Used    Alcohol use Yes      Comment: socially    Drug use: Unknown    Sexual activity: Not on file       Current Outpatient Prescriptions on File Prior to Visit   Medication Sig    Naltrexone-Bupropion HCl ER (CONTRAVE) 8-90 MG TB12 Take 1 tablet in the morning for 1 week then 1 tablet twice a day for 1 week then 2 tablets in the morning and 1 tablet at night for 1 week then 2 tablets twice daily  No current facility-administered medications on file prior to visit  Allergies   Allergen Reactions    Ibuprofen Hives    Nsaids Rash and Swelling     Swelling of lips       Physical Exam:    /81   Pulse 59   Temp 98 5 °F (36 9 °C)   Ht 5' 6" (1 676 m)   Wt 97 4 kg (214 lb 12 8 oz)   BMI 34 67 kg/m²     Constitutional: normal, well developed, well nourished, alert, in no distress and non-toxic and no overt pain behavior    Eyes: anicteric  HEENT: grossly intact  Neck: supple, symmetric, trachea midline and no masses Pulmonary:even and unlabored  Cardiovascular:No edema or pitting edema present  Skin:Normal without rashes or lesions and well hydrated  Psychiatric:Mood and affect appropriate  Neurologic:Cranial Nerves II-XII grossly intact  Musculoskeletal:normal gait  Left cervical paraspinals and trapezius tender to palpation and ropy in texture  Positive Spurling's to the left and negative to the right  Bilateral upper extremity strength 5/5 in all muscle groups  Imaging:    MRI Cervical spine dated 3/9/2018    FINDINGS:     ALIGNMENT:  Reversal of the cervical lordosis centered at the C4 segment  No subluxation      MARROW SIGNAL:  Scattered degenerative endplate changes  No focally suspicious marrow lesions  No bone marrow edema or compression abnormality      CERVICAL AND VISUALIZED THORACIC CORD:  Normal signal within the visualized cord      PREVERTEBRAL AND PARASPINAL SOFT TISSUES:   Normal      VISUALIZED POSTERIOR FOSSA:  The visualized posterior fossa demonstrates no abnormal signal      CERVICAL DISC SPACES:     C2-C3:  Normal      C3-C4:  There is a diffuse disk bulge  No significant central canal or neural foraminal narrowing  This level is similar to the prior study      C4-C5:  There is a diffuse disk bulge  No significant central canal or neural foraminal narrowing  This level is similar to the prior study      C5-C6:  There is a disc osteophyte complex with a superimposed right neural foraminal disc protrusion  Moderate right neural foraminal narrowing  Mild central canal narrowing  Mild to moderate left neural foraminal narrowing  This level is similar to   the prior study      C6-C7:  There is a disc osteophyte complex with a superimposed left neural foraminal disc protrusion  There is moderate left neural foraminal narrowing  Mild central canal narrowing  Mild right neural foraminal narrowing   This level is similar to the   prior study      C7-T1:  Normal      UPPER THORACIC DISC SPACES: Normal      IMPRESSION:     Mild reversal of the cervical lordosis at C4 with scattered mild to moderate spondylotic changes are similar to the previous study  No new disc herniation    No cord compression or cord signal abnormality

## 2018-03-13 NOTE — TELEPHONE ENCOUNTER
----- Message from Valeri Diego DO sent at 3/13/2018  7:27 AM EDT -----  Please notify the patient the MRI of his cervical spine reveals degenerative disc disease with disc protrusions at C5-6 and C6-7  There is moderate right, mild central, and mild-to-moderate left foraminal stenosis at C5-6 which was similar to his previous study  There is also a disc protrusion producing moderate left, mild central, and mild right foraminal stenosis  I can offer the patient a cervical epidural steroid injection

## 2018-03-30 ENCOUNTER — HOSPITAL ENCOUNTER (OUTPATIENT)
Dept: RADIOLOGY | Facility: CLINIC | Age: 38
Discharge: HOME | End: 2018-03-30
Payer: COMMERCIAL

## 2018-03-30 VITALS
TEMPERATURE: 97.4 F | DIASTOLIC BLOOD PRESSURE: 85 MMHG | RESPIRATION RATE: 18 BRPM | SYSTOLIC BLOOD PRESSURE: 115 MMHG | HEART RATE: 59 BPM | OXYGEN SATURATION: 100 %

## 2018-03-30 DIAGNOSIS — M54.12 CERVICAL RADICULOPATHY: ICD-10-CM

## 2018-03-30 PROCEDURE — 62321 NJX INTERLAMINAR CRV/THRC: CPT | Performed by: ANESTHESIOLOGY

## 2018-03-30 RX ORDER — PAPAVERINE HCL 150 MG
10 CAPSULE, EXTENDED RELEASE ORAL ONCE
Status: COMPLETED | OUTPATIENT
Start: 2018-03-30 | End: 2018-03-30

## 2018-03-30 RX ORDER — LIDOCAINE HYDROCHLORIDE 10 MG/ML
5 INJECTION, SOLUTION EPIDURAL; INFILTRATION; INTRACAUDAL; PERINEURAL ONCE
Status: COMPLETED | OUTPATIENT
Start: 2018-03-30 | End: 2018-03-30

## 2018-03-30 RX ADMIN — DEXAMETHASONE SODIUM PHOSPHATE 10 MG: 10 INJECTION, SOLUTION INTRAMUSCULAR; INTRAVENOUS at 09:03

## 2018-03-30 RX ADMIN — LIDOCAINE HYDROCHLORIDE 3 ML: 10 INJECTION, SOLUTION EPIDURAL; INFILTRATION; INTRACAUDAL; PERINEURAL at 09:04

## 2018-03-30 RX ADMIN — IOHEXOL 1 ML: 300 INJECTION, SOLUTION INTRAVENOUS at 09:03

## 2018-03-30 NOTE — H&P
History of Present Illness: The patient is a 34 y o  male who presents with complaints of neck and arm pain  Patient Active Problem List   Diagnosis    Cervical radiculopathy    Elbow pain, chronic, left    Impingement syndrome of left shoulder    Left lateral epicondylitis    Myofascial pain    Right lateral epicondylitis    Obesity (BMI 30-39  9)    Cervical spinal stenosis    Cervical herniated disc       Past Medical History:   Diagnosis Date    Epilepsy ([address] )     and recurrent seizures, one time age 13       Past Surgical History:   Procedure Laterality Date    ANTERIOR CRUCIATE LIGAMENT REPAIR Right          Current Outpatient Prescriptions:     Naltrexone-Bupropion HCl ER (CONTRAVE) 8-90 MG TB12, Take 1 tablet in the morning for 1 week then 1 tablet twice a day for 1 week then 2 tablets in the morning and 1 tablet at night for 1 week then 2 tablets twice daily  , Disp: 120 tablet, Rfl: 5    Current Facility-Administered Medications:     dexamethasone (PF) (DECADRON) injection 10 mg, 10 mg, Epidural, Once, Liberty Valles, DO    iohexol (OMNIPAQUE) 300 mg/mL injection 50 mL, 50 mL, Injection, Once, Mike Bolden, DO    lidocaine (PF) (XYLOCAINE-MPF) 1 % injection 5 mL, 5 mL, Intra-articular, Once, Mike Bolden, DO    Allergies   Allergen Reactions    Ibuprofen Hives    Nsaids Rash and Swelling     Swelling of lips       Physical Exam:   Vitals:    03/30/18 0837   BP: 117/74   Pulse: 61   Resp: 18   Temp: (!) 97 4 °F (36 3 °C)   SpO2: 97%     General: Awake, Alert, Oriented x 3, Mood and affect appropriate  Respiratory: Respirations even and unlabored  Cardiovascular: Peripheral pulses intact; no edema  Musculoskeletal Exam:   Bilateral cervical paraspinals tender to palpation  ASA Score: 2    Assessment:   1  Cervical radiculopathy        Plan: cervical radiculopathy - TASIA      Assessment:  1  Cervical radiculopathy    2  Impingement syndrome of left shoulder    3  Myofascial pain    4  Cervical spinal stenosis    5  Cervical herniated disc          Plan:  70-year-old male returning for follow-up of neck pain and cervical radiculopathy in the left upper extremity  The patient also has a history of left shoulder surgery x2 with some evidence of subacromial bursitis and impingement syndrome  The patient did have bilateral lateral epicondylitis and had received steroid injections by his PCP with excellent relief  The patient's most recent MRI of cervical spine reveals degenerative disc disease and disc herniations at C5-6 and C6-7 resulting in central and foraminal stenosis most pronounced at C5-6 without any evidence of cord or neural compression  The patient has tried physical therapy which only made the symptoms worse  He was prescribed Lyrica, however he did not start this medication is he would like to avoid sedating medications if possible  He does take ibuprofen p r n  rarely  1   I will schedule the patient for cervical epidural steroid injection to reduce the inflammatory component of his pain  2   The patient may continue with ibuprofen p r n  and should not exceed more than 800 milligrams q 8 hours p r n   3   The patient will continue with his home exercise program  4  I will follow up the patient in 6-8 weeks after injection  5  The patient may benefit from left subacromial bursa injection in the future     Complete risks and benefits including bleeding, infection, tissue reaction, nerve injury and allergic reaction were discussed  The approach was demonstrated using models and literature was provided  Verbal and written consent was obtained      My impressions and treatment recommendations were discussed in detail with the patient who verbalized understanding and had no further questions  Discharge instructions were provided   I personally saw and examined the patient and I agree with the above discussed plan of care      No orders of the defined types were placed in this encounter      No orders of the defined types were placed in this encounter         History of Present Illness:    Naga Sharma is a 34 y o  male male returning for follow-up of neck pain and cervical radiculopathy into the left upper extremity, left subacromial bursitis and impingement syndrome, history of left shoulder surgery x2, and bilateral lateral epicondylitis  The patient did have bilateral lateral epicondyle steroid injections by his PCP with excellent relief  The patient did try physical therapy for his neck which only made the symptoms worse  He was prescribed Lyrica, however the patient wishes to avoid sedating medications  He is currently taking ibuprofen p r n  with mild relief  The patient did get an MRI of his cervical spine revealing degenerative disc disease and disc herniations at C5-6 and C6-7 resulting in central and foraminal stenosis most pronounced at C5-6  There is no evidence of neural or cord compression  The patient rates his pain a 4 to 8/10 depending upon what he is doing  The pain does not follow any particular pattern throughout the day  The pain is described as constant, numbness, and pins and needles  The pain is worse with bending and twisting his neck and lifting  The pain is alleviated with relaxation, sitting, and lying down      I have personally reviewed and/or updated the patient's past medical history, past surgical history, family history, social history, current medications, allergies, and vital signs today       Other than as stated above, the patient denies any interval changes in medications, medical condition, mental condition, symptoms, or allergies since the last office visit  Review of Systems:     Review of Systems   Respiratory: Negative for shortness of breath  Cardiovascular: Negative for chest pain  Gastrointestinal: Negative for constipation, diarrhea, nausea and vomiting     Musculoskeletal: Negative for arthralgias, gait problem, joint swelling and myalgias  Decreased ROM   Skin: Negative for rash  Neurological: Negative for dizziness, seizures and weakness  All other systems reviewed and are negative             Patient Active Problem List   Diagnosis    Cervical radiculopathy    Elbow pain, chronic, left    Impingement syndrome of left shoulder    Left lateral epicondylitis    Myofascial pain    Right lateral epicondylitis    Obesity (BMI 30-39  9)         Medical History        Past Medical History:   Diagnosis Date    Epilepsy ([address] )       and recurrent seizures, one time age 13            Surgical History         Past Surgical History:   Procedure Laterality Date    ANTERIOR CRUCIATE LIGAMENT REPAIR Right                     Family History   Problem Relation Age of Onset    Other Mother         liver transplant         Social History          Occupational History    Not on file                Social History Main Topics      Smoking status: Never Smoker     Smokeless tobacco: Never Used     Alcohol use Yes          Comment: socially      Drug use: Unknown    Sexual activity: Not on file               Current Outpatient Prescriptions on File Prior to Visit   Medication Sig    Naltrexone-Bupropion HCl ER (CONTRAVE) 8-90 MG TB12 Take 1 tablet in the morning for 1 week then 1 tablet twice a day for 1 week then 2 tablets in the morning and 1 tablet at night for 1 week then 2 tablets twice daily       No current facility-administered medications on file prior to visit                 Allergies   Allergen Reactions    Ibuprofen Hives    Nsaids Rash and Swelling       Swelling of lips         Physical Exam:     /81   Pulse 59   Temp 98 5 °F (36 9 °C)   Ht 5' 6" (1 676 m)   Wt 97 4 kg (214 lb 12 8 oz)   BMI 34 67 kg/m²      Constitutional: normal, well developed, well nourished, alert, in no distress and non-toxic and no overt pain behavior    Eyes: anicteric  HEENT: grossly intact  Neck: supple, symmetric, trachea midline and no masses   Pulmonary:even and unlabored  Cardiovascular:No edema or pitting edema present  Skin:Normal without rashes or lesions and well hydrated  Psychiatric:Mood and affect appropriate  Neurologic:Cranial Nerves II-XII grossly intact  Musculoskeletal:normal gait  Left cervical paraspinals and trapezius tender to palpation and ropy in texture  Positive Spurling's to the left and negative to the right    Bilateral upper extremity strength 5/5 in all muscle groups

## 2018-03-30 NOTE — DISCHARGE INSTRUCTIONS
Epidural Steroid Injection   WHAT YOU NEED TO KNOW:   An epidural steroid injection (ARINA) is a procedure to inject steroid medicine into the epidural space  The epidural space is between your spinal cord and vertebrae  Steroids reduce inflammation and fluid buildup in your spine that may be causing pain  You may be given pain medicine along with the steroids  ACTIVITY  · Do not drive or operate machinery today  · No strenuous activity today - bending, lifting, etc   · You may resume normal activites starting tomorrow - start slowly and as tolerated  · You may shower today, but no tub baths or hot tubs  · You may have numbness for several hours from the local anesthetic  Please use caution and common sense, especially with weight-bearing activities  CARE OF THE INJECTION SITE  · If you have soreness or pain, apply ice to the area today (20 minutes on/20 minutes off)  · Starting tomorrow, you may use warm, moist heat or ice if needed  · You may have an increase or change in your discomfort for 36-48 hours after your treatment  · Apply ice and continue with any pain medication you have been prescribed  · [facility]ify [facility] if you have any of the following: redness, drainage, swelling, headache, stiff neck or fever above 100°F     SPECIAL INSTRUCTIONS  · Our office will contact you in approximately 7 days for a progress report  MEDICATIONS  · Continue to take all routine medications  · Our office may have instructed you to hold some medications  If you have a problem specifically related to your procedure, please call our office at [phone]  Problems not related to your procedure should be directed to your primary care physician

## 2018-04-05 ENCOUNTER — TELEPHONE (OUTPATIENT)
Dept: PAIN MEDICINE | Facility: MEDICAL CENTER | Age: 38
End: 2018-04-05

## 2018-04-05 NOTE — TELEPHONE ENCOUNTER
S/W pt  Pt had a TASIA done on 3/30/18  Pt states that every since the procedure everytime he straightens his neck or leans his head back or forward that he has pain  Pt states the pain is more to the right side of his neck and the left side of his neck was the side hurting when he had the procedure done  He says it feels like a pinched nerve  Advised pt to use ice - 20 min on 20 min off to the area and take Tylenol until [facility] calls him back with JW recommendations  Advised pt if the pain becomes to severe to always go to the ER  Pt verbalized understanding

## 2018-04-05 NOTE — TELEPHONE ENCOUNTER
The pain can be slightly worse until the steroids have taken full effect which can take up to 2 weeks  We will see how the patient does over the next week or so and hopefully his symptoms will improve  The patient can continue with ibuprofen 800 milligrams q 8 hours p r n  and Tylenol as recommended below  Agree that if the pain is too severe the patient should go to the ER for further evaluation  The patient should also report any signs or symptoms of infection immediately

## 2018-04-05 NOTE — TELEPHONE ENCOUNTER
Pt is calling because he had a injection 3/30  He is having pain in his spine and is not sure if he should feel that way after the shot   Please call pt back at [phone]

## 2018-04-06 NOTE — TELEPHONE ENCOUNTER
S/w the patient and reiterated the previous task  He stated he is allergic to the ibuprofen and will give it another week to get the full effects of the medication  If his pain increases over the weekend then he will seek treatment at the ER

## 2018-04-17 ENCOUNTER — TELEPHONE (OUTPATIENT)
Dept: FAMILY MEDICINE CLINIC | Facility: CLINIC | Age: 38
End: 2018-04-17

## 2018-04-17 DIAGNOSIS — E66.01 CLASS 2 SEVERE OBESITY DUE TO EXCESS CALORIES WITH SERIOUS COMORBIDITY AND BODY MASS INDEX (BMI) OF 35.0 TO 35.9 IN ADULT (HCC): Primary | ICD-10-CM

## 2018-04-17 RX ORDER — PHENTERMINE HYDROCHLORIDE 37.5 MG/1
37.5 TABLET ORAL DAILY
Qty: 30 TABLET | Refills: 1 | OUTPATIENT
Start: 2018-04-17 | End: 2018-07-05 | Stop reason: SDUPTHER

## 2018-04-17 NOTE — TELEPHONE ENCOUNTER
Patient to decrease from 4 tablets to 3 tablets for 3 days then 2 tablets for 3 days then 1 tablet for 3 days then stop

## 2018-04-17 NOTE — TELEPHONE ENCOUNTER
This was just for documentation purposes  It was called into the [facility] pharmacy already just done task please

## 2018-04-17 NOTE — TELEPHONE ENCOUNTER
CALLED PT TO DISCUSS CONTRAVE  HE STATES  HE HAS ONLY BEEN TAKING 2 IN THE AM FOR THE PAST 2 DAYS  DO YOU STILL WANT THE SAME DIRECTIONS?

## 2018-04-17 NOTE — TELEPHONE ENCOUNTER
Patient called stating he is almost finished taking the Contrave and he hasn't lost any weight he requests to go back on phentermine

## 2018-07-05 DIAGNOSIS — E66.01 CLASS 2 SEVERE OBESITY DUE TO EXCESS CALORIES WITH SERIOUS COMORBIDITY AND BODY MASS INDEX (BMI) OF 35.0 TO 35.9 IN ADULT (HCC): ICD-10-CM

## 2018-07-05 RX ORDER — PHENTERMINE HYDROCHLORIDE 37.5 MG/1
37.5 TABLET ORAL DAILY
Qty: 30 TABLET | Refills: 1 | OUTPATIENT
Start: 2018-07-05 | End: 2018-08-17

## 2018-07-30 ENCOUNTER — OFFICE VISIT (OUTPATIENT)
Dept: FAMILY MEDICINE CLINIC | Facility: CLINIC | Age: 38
End: 2018-07-30
Payer: COMMERCIAL

## 2018-07-30 VITALS
BODY MASS INDEX: 33.01 KG/M2 | WEIGHT: 205.4 LBS | DIASTOLIC BLOOD PRESSURE: 84 MMHG | SYSTOLIC BLOOD PRESSURE: 122 MMHG | HEIGHT: 66 IN

## 2018-07-30 DIAGNOSIS — L65.9 HAIR LOSS: ICD-10-CM

## 2018-07-30 DIAGNOSIS — M75.42 IMPINGEMENT SYNDROME OF LEFT SHOULDER: ICD-10-CM

## 2018-07-30 DIAGNOSIS — M77.11 RIGHT LATERAL EPICONDYLITIS: ICD-10-CM

## 2018-07-30 DIAGNOSIS — E66.9 OBESITY (BMI 30-39.9): ICD-10-CM

## 2018-07-30 DIAGNOSIS — M54.12 CERVICAL RADICULOPATHY: ICD-10-CM

## 2018-07-30 DIAGNOSIS — M50.20 CERVICAL HERNIATED DISC: Primary | ICD-10-CM

## 2018-07-30 DIAGNOSIS — D49.2 SKIN NEOPLASM: ICD-10-CM

## 2018-07-30 DIAGNOSIS — M77.12 LEFT LATERAL EPICONDYLITIS: ICD-10-CM

## 2018-07-30 PROCEDURE — 99214 OFFICE O/P EST MOD 30 MIN: CPT | Performed by: FAMILY MEDICINE

## 2018-07-30 RX ORDER — TOPIRAMATE 25 MG/1
50 CAPSULE, COATED PELLETS ORAL DAILY
Qty: 60 CAPSULE | Refills: 3 | Status: SHIPPED | OUTPATIENT
Start: 2018-07-30 | End: 2018-09-07 | Stop reason: SDUPTHER

## 2018-07-30 NOTE — PROGRESS NOTES
Assessment/Plan:  Pt  Will have lesions removed  Pt  Will see Dr Surya Toscano for injections  fmla paper for anxiety  Diagnoses and all orders for this visit:    Cervical herniated disc    Left lateral epicondylitis    Right lateral epicondylitis    Cervical radiculopathy    Obesity (BMI 30-39  9)    Skin neoplasm    Hair loss    Impingement syndrome of left shoulder          Subjective:      Patient ID: Kathe Vazquez is a 34 y o  male  Pt  Is here for skin lesions on back, left axilla and left neck  Pt  Also here for ongoing cervical pain and shoulder pain and elbow pain bilaterally  Pt  Also has hair loss  The following portions of the patient's history were reviewed and updated as appropriate: allergies, current medications, past family history, past social history, past surgical history and problem list     Review of Systems   Constitutional: Negative  HENT: Negative  Eyes: Negative  Respiratory: Negative  Cardiovascular: Negative  Gastrointestinal: Negative  Endocrine: Negative  Genitourinary: Negative  Musculoskeletal: Positive for arthralgias and neck pain  Skin:        Lesions on skin  Hair loss  Allergic/Immunologic: Negative  Neurological: Negative  Hematological: Negative  Psychiatric/Behavioral: Negative  Objective:      /84 (BP Location: Right arm, Patient Position: Sitting, Cuff Size: Adult)   Ht 5' 5 5" (1 664 m)   Wt 93 2 kg (205 lb 6 4 oz)   BMI 33 66 kg/m²          Physical Exam   Constitutional: He is oriented to person, place, and time  He appears well-developed and well-nourished  No distress  HENT:   Head: Normocephalic  Right Ear: External ear normal    Left Ear: External ear normal    Mouth/Throat: Oropharynx is clear and moist  No oropharyngeal exudate  Eyes: EOM are normal  Pupils are equal, round, and reactive to light  Right eye exhibits no discharge  Left eye exhibits no discharge  No scleral icterus     Neck: Normal range of motion  Neck supple  No thyromegaly present  Cardiovascular: Normal rate, regular rhythm, normal heart sounds and intact distal pulses  Exam reveals no gallop and no friction rub  No murmur heard  Pulmonary/Chest: Effort normal and breath sounds normal  No respiratory distress  He has no wheezes  He has no rales  He exhibits no tenderness  Abdominal: Soft  Bowel sounds are normal  He exhibits no distension  There is no tenderness  There is no rebound and no guarding  Musculoskeletal: Normal range of motion  He exhibits tenderness  He exhibits no edema  Lymphadenopathy:     He has no cervical adenopathy  Neurological: He is oriented to person, place, and time  No cranial nerve deficit  He exhibits normal muscle tone  Coordination normal    Skin: Skin is warm and dry  No rash noted  He is not diaphoretic  No erythema  No pallor  Hair loss vertex  Psychiatric: He has a normal mood and affect  His behavior is normal  Judgment and thought content normal    Nursing note and vitals reviewed

## 2018-07-31 ENCOUNTER — TELEPHONE (OUTPATIENT)
Dept: FAMILY MEDICINE CLINIC | Facility: CLINIC | Age: 38
End: 2018-07-31

## 2018-07-31 ENCOUNTER — TELEPHONE (OUTPATIENT)
Dept: PAIN MEDICINE | Facility: MEDICAL CENTER | Age: 38
End: 2018-07-31

## 2018-07-31 NOTE — TELEPHONE ENCOUNTER
Patient called stating he is having increased pain and his pcp told him to f/u with [facility]  Patient stated he wants to have another injection  Patient last seen in march   C/b [phone]

## 2018-07-31 NOTE — TELEPHONE ENCOUNTER
PATIENT CALLED HE WAS QUESTIONING IF YOU WOULD LIKE HIM TO GO FOR BLOOD WORK, PATIENT WAS SEEN ON 7/30/2018 PATIENT CAN BE REACHED [phone]   Lee Degree

## 2018-07-31 NOTE — TELEPHONE ENCOUNTER
If the patient got relief with cervical epidural steroid injection okay to schedule repeat injection

## 2018-07-31 NOTE — TELEPHONE ENCOUNTER
Looks like the patient had a TASIA on 3/30  S/w the patient today and he stated his pain is slowly returning  It is most concentrated in the Left shoulder blade, feels like a knot again  He does have some twinging in his neck  I did read the previous note, where you were thinking about possibly giving the patient a left subacromial bursa injection? The patient thinks the TASIA helped him previously also with the knot in his shoulder blade  What do you think?  Thanks

## 2018-08-01 DIAGNOSIS — E66.9 CLASS 2 OBESITY IN ADULT, UNSPECIFIED BMI, UNSPECIFIED OBESITY TYPE, UNSPECIFIED WHETHER SERIOUS COMORBIDITY PRESENT: Primary | ICD-10-CM

## 2018-08-06 NOTE — TELEPHONE ENCOUNTER
Called pt, scheduled repeat TASIA on 08/21/18- pt denies blood thinners/ NSAIDs  Went over pre procedure instructions, NPO 1 hr prior, if sick or on abx needs to call to rs, wear loose, comf clothing like Tshirt- no jewelry, needs   Pt verbalized understanding  Mailed pre op instructions

## 2018-08-17 ENCOUNTER — OFFICE VISIT (OUTPATIENT)
Dept: FAMILY MEDICINE CLINIC | Facility: CLINIC | Age: 38
End: 2018-08-17
Payer: COMMERCIAL

## 2018-08-17 VITALS
DIASTOLIC BLOOD PRESSURE: 80 MMHG | BODY MASS INDEX: 32.62 KG/M2 | HEIGHT: 66 IN | SYSTOLIC BLOOD PRESSURE: 130 MMHG | WEIGHT: 203 LBS

## 2018-08-17 DIAGNOSIS — D49.2 SKIN NEOPLASM: Primary | ICD-10-CM

## 2018-08-17 PROCEDURE — 11301 SHAVE SKIN LESION 0.6-1.0 CM: CPT | Performed by: FAMILY MEDICINE

## 2018-08-17 PROCEDURE — 88305 TISSUE EXAM BY PATHOLOGIST: CPT | Performed by: PATHOLOGY

## 2018-08-17 NOTE — PROGRESS NOTES
Assessment/Plan:         Diagnoses and all orders for this visit:    Skin neoplasm  -     Tissue Exam; Future  -     Tissue Exam; Future    Other orders  -     Shave Removal          Subjective:      Patient ID: More Mccormack is a 34 y o  male  Patient is here for removal from left flank and back  Patient has noticed change in appearance to both lesions  The following portions of the patient's history were reviewed and updated as appropriate: allergies, current medications, past family history, past medical history, past social history, past surgical history and problem list     Review of Systems   Skin:        Skin lesions left flank and back         Objective:      /80 (BP Location: Right arm, Patient Position: Sitting, Cuff Size: Standard)   Ht 5' 5 5" (1 664 m)   Wt 92 1 kg (203 lb)   BMI 33 27 kg/m²          Physical Exam   Skin:   Irregular 7 mm lesion on left thoracic back as well as left flank/trunk laterally   Nursing note and vitals reviewed  Shave lesion  Date/Time: 8/17/2018 4:22 PM  Performed by: Jose Almanza  Authorized by: Jose Almanza     Number of Lesions: 2  Lesion 1:     Body area: trunk    Trunk location: back    Initial size (mm): 7    Final defect size (mm): 7    Malignancy: benign lesion      Destruction method: shave removal    Lesion 2:     Body area: trunk    Trunk location: L flank    Initial size (mm): 7    Final defect size (mm): 7    Malignancy: benign lesion      Comments:  Informed consent obtained  Betadine and alcohol use  0 5 cc of lidocaine 2% with epinephrine used to each lesion  Shave excision done x2  Electrodesiccation done  Specimen sent to pathology  Neosporin and Band-Aid applied  Patient will keep area clean and dry and use Neosporin daily  Patient will follow up in 2 weeks to go over path report

## 2018-08-21 ENCOUNTER — HOSPITAL ENCOUNTER (OUTPATIENT)
Dept: RADIOLOGY | Facility: CLINIC | Age: 38
Discharge: HOME | End: 2018-08-21
Payer: COMMERCIAL

## 2018-08-21 VITALS
OXYGEN SATURATION: 99 % | SYSTOLIC BLOOD PRESSURE: 105 MMHG | HEART RATE: 71 BPM | TEMPERATURE: 97.4 F | DIASTOLIC BLOOD PRESSURE: 71 MMHG | RESPIRATION RATE: 16 BRPM

## 2018-08-21 DIAGNOSIS — M54.12 CERVICAL RADICULOPATHY: ICD-10-CM

## 2018-08-21 PROCEDURE — 62321 NJX INTERLAMINAR CRV/THRC: CPT | Performed by: ANESTHESIOLOGY

## 2018-08-21 RX ORDER — PAPAVERINE HCL 150 MG
10 CAPSULE, EXTENDED RELEASE ORAL ONCE
Status: COMPLETED | OUTPATIENT
Start: 2018-08-21 | End: 2018-08-21

## 2018-08-21 RX ORDER — LIDOCAINE HYDROCHLORIDE 10 MG/ML
5 INJECTION, SOLUTION EPIDURAL; INFILTRATION; INTRACAUDAL; PERINEURAL ONCE
Status: COMPLETED | OUTPATIENT
Start: 2018-08-21 | End: 2018-08-21

## 2018-08-21 RX ADMIN — IOHEXOL 1 ML: 300 INJECTION, SOLUTION INTRAVENOUS at 14:32

## 2018-08-21 RX ADMIN — DEXAMETHASONE SODIUM PHOSPHATE 10 MG: 10 INJECTION, SOLUTION INTRAMUSCULAR; INTRAVENOUS at 14:33

## 2018-08-21 RX ADMIN — LIDOCAINE HYDROCHLORIDE 3 ML: 10 INJECTION, SOLUTION EPIDURAL; INFILTRATION; INTRACAUDAL; PERINEURAL at 14:30

## 2018-08-21 NOTE — H&P
History of Present Illness: The patient is a 34 y o  male who presents with complaints of neck and arm pain  Patient Active Problem List   Diagnosis    Cervical radiculopathy    Elbow pain, chronic, left    Impingement syndrome of left shoulder    Left lateral epicondylitis    Myofascial pain    Right lateral epicondylitis    Obesity (BMI 30-39  9)    Cervical spinal stenosis    Cervical herniated disc    Hair loss    Skin neoplasm       Past Medical History:   Diagnosis Date    Epilepsy ([address] )     and recurrent seizures, one time age 13       Past Surgical History:   Procedure Laterality Date    ANTERIOR CRUCIATE LIGAMENT REPAIR Right          Current Outpatient Prescriptions:     topiramate (TOPAMAX) 25 mg sprinkle capsule, Take 2 capsules (50 mg total) by mouth daily, Disp: 60 capsule, Rfl: 3    Current Facility-Administered Medications:     dexamethasone (PF) (DECADRON) injection 10 mg, 10 mg, Intra-articular, Once, Stephanie Ojeda,     iohexol (OMNIPAQUE) 300 mg/mL injection 50 mL, 50 mL, Injection, Once, Mike Bolden DO    lidocaine (PF) (XYLOCAINE-MPF) 1 % injection 5 mL, 5 mL, Intra-articular, Once, Mike Bolden,     Allergies   Allergen Reactions    Ibuprofen Hives    Nsaids Rash and Swelling     Swelling of lips       Physical Exam:   Vitals:    08/21/18 1408   BP: 101/62   Pulse: 66   Resp: 16   Temp: (!) 97 4 °F (36 3 °C)   SpO2: 99%     General: Awake, Alert, Oriented x 3, Mood and affect appropriate  Respiratory: Respirations even and unlabored  Cardiovascular: Peripheral pulses intact; no edema  Musculoskeletal Exam:  Bilateral cervical paraspinals tender to palpation  ASA Score: 2    Patient/Chart Verification  Patient ID Verified: Verbal  Consents Confirmed: Procedural, To be obtained in the Pre-Procedure area  H&P( within 30 days) Verified: To be obtained in the Pre-Procedure area  Interval H&P(within 24 hr) Complete (required for Outpatients and Surgery Admit only):  To be obtained in the Pre-Procedure area  Allergies Reviewed: Yes  Anticoag/NSAID held?: NA  Currently on antibiotics?: No  Pregnancy denied?: NA    Assessment:   1   Cervical radiculopathy        Plan: REPEAT TASIA

## 2018-08-21 NOTE — DISCHARGE INSTRUCTIONS
Epidural Steroid Injection   WHAT YOU NEED TO KNOW:   An epidural steroid injection (ARINA) is a procedure to inject steroid medicine into the epidural space  The epidural space is between your spinal cord and vertebrae  Steroids reduce inflammation and fluid buildup in your spine that may be causing pain  You may be given pain medicine along with the steroids  ACTIVITY  · Do not drive or operate machinery today  · No strenuous activity today - bending, lifting, etc   · You may resume normal activites starting tomorrow - start slowly and as tolerated  · You may shower today, but no tub baths or hot tubs  · You may have numbness for several hours from the local anesthetic  Please use caution and common sense, especially with weight-bearing activities  CARE OF THE INJECTION SITE  · If you have soreness or pain, apply ice to the area today (20 minutes on/20 minutes off)  · Starting tomorrow, you may use warm, moist heat or ice if needed  · You may have an increase or change in your discomfort for 36-48 hours after your treatment  · Apply ice and continue with any pain medication you have been prescribed  · Notify the [facility] if you have any of the following: redness, drainage, swelling, headache, stiff neck or fever above 100°F     SPECIAL INSTRUCTIONS  · Our office will contact you in approximately 7 days for a progress report  MEDICATIONS  · Continue to take all routine medications  · Our office may have instructed you to hold some medications  If you have a problem specifically related to your procedure, please call our office at [phone]  Problems not related to your procedure should be directed to your primary care physician

## 2018-08-27 ENCOUNTER — TELEPHONE (OUTPATIENT)
Dept: FAMILY MEDICINE CLINIC | Facility: CLINIC | Age: 38
End: 2018-08-27

## 2018-08-27 NOTE — TELEPHONE ENCOUNTER
PT ASKING IF HE SHOULD CHANGE THE DOSING OF THE MEDICATION YOU PRESCRIBED  I DIDN'T REALIZE IT WAS FOR WEIGHT LOSS UNTIL FURTHER INTO MY CONVERSATION  SO ITS NOT REALLY HELPING  HE IS NOT SEEING ANY CHANGE AT THE DOSE HE IS ON  DO YOU WANT TO KEEP HIM ON THIS SAME LEVEL OR OR UP IT   PLEASE ADDRESS

## 2018-08-28 ENCOUNTER — TELEPHONE (OUTPATIENT)
Dept: RADIOLOGY | Facility: CLINIC | Age: 38
End: 2018-08-28

## 2018-08-29 ENCOUNTER — TELEPHONE (OUTPATIENT)
Dept: FAMILY MEDICINE CLINIC | Facility: CLINIC | Age: 38
End: 2018-08-29

## 2018-08-29 NOTE — TELEPHONE ENCOUNTER
Spoke with patient today and gave instructions as per Dr Tammie Washington notation      Increase medicine from 2 tablets nightly to 2 tablets twice daily

## 2018-08-29 NOTE — TELEPHONE ENCOUNTER
Spoke with patient today and stated the below message from Dr Bayron Nagy from 2 tablets nightly to 2 tablets twice daily

## 2018-09-04 NOTE — TELEPHONE ENCOUNTER
S/W pt  Advised pt of the same  Pt verbalized understanding  Pt will call back in a week if no changes in his pain or any issues

## 2018-09-04 NOTE — TELEPHONE ENCOUNTER
Pt states that he is feeling good  State that he is not in pain on the side that the injection was done but now has some pain on the opposite side of which the injection was done  States that the pain is a sharp pinching pain with the movement of his shoulder blades  States that his pain is 8/10  States that the injection did provide him with 80% relief of original pain  Pt would like to know if it would be a good idea to go to a chiropractor or if that would aggravate the pain more?

## 2018-09-04 NOTE — TELEPHONE ENCOUNTER
Aware   Will see how the patient does over the next week as it can take up to 2 weeks for the steroids to reach her full effect  I would have the patient avoid chiropractic treatment considering the nature of his pathology in his cervical spine  Massage therapy would be fine

## 2018-09-07 ENCOUNTER — OFFICE VISIT (OUTPATIENT)
Dept: FAMILY MEDICINE CLINIC | Facility: CLINIC | Age: 38
End: 2018-09-07
Payer: COMMERCIAL

## 2018-09-07 VITALS
DIASTOLIC BLOOD PRESSURE: 76 MMHG | HEIGHT: 66 IN | RESPIRATION RATE: 16 BRPM | OXYGEN SATURATION: 98 % | HEART RATE: 72 BPM | BODY MASS INDEX: 33.11 KG/M2 | WEIGHT: 206 LBS | SYSTOLIC BLOOD PRESSURE: 108 MMHG

## 2018-09-07 DIAGNOSIS — E66.9 OBESITY (BMI 30-39.9): ICD-10-CM

## 2018-09-07 DIAGNOSIS — M54.50 CHRONIC BILATERAL LOW BACK PAIN WITHOUT SCIATICA: ICD-10-CM

## 2018-09-07 DIAGNOSIS — G89.29 CHRONIC BILATERAL LOW BACK PAIN WITHOUT SCIATICA: ICD-10-CM

## 2018-09-07 DIAGNOSIS — S86.811A STRAIN OF CALF MUSCLE, RIGHT, INITIAL ENCOUNTER: Primary | ICD-10-CM

## 2018-09-07 PROBLEM — S86.819A STRAIN OF CALF MUSCLE: Status: ACTIVE | Noted: 2018-09-07

## 2018-09-07 PROCEDURE — 3008F BODY MASS INDEX DOCD: CPT | Performed by: FAMILY MEDICINE

## 2018-09-07 PROCEDURE — 99213 OFFICE O/P EST LOW 20 MIN: CPT | Performed by: FAMILY MEDICINE

## 2018-09-07 RX ORDER — TOPIRAMATE 25 MG/1
50 CAPSULE, COATED PELLETS ORAL 2 TIMES DAILY
Qty: 120 CAPSULE | Refills: 5 | Status: SHIPPED | OUTPATIENT
Start: 2018-09-07 | End: 2019-01-25

## 2018-09-07 NOTE — PROGRESS NOTES
Assessment/Plan:   patient will rest calf guidance given overall  Patient will use Feldene which he has tolerated in the past    Patient will see chiropractor the tomorrow for low back pain  Diagnoses and all orders for this visit:    Strain of calf muscle, right, initial encounter  -     piroxicam (FELDENE) 20 mg capsule; Take 1 capsule (20 mg total) by mouth daily    Obesity (BMI 30-39 9)  -     topiramate (TOPAMAX) 25 mg sprinkle capsule; Take 2 capsules (50 mg total) by mouth 2 (two) times a day    Chronic bilateral low back pain without sciatica          Subjective:      Patient ID: Maximino Salgado is a 34 y o  male  Patient is here with right calf pain over the past week  This all started when patient was jogging  No redness or ecchymosis  No pop or snap noted  Patient with good range of motion  Patient used ice and has stayed off leg  Leg Pain      Back Pain   Associated symptoms include leg pain  Medication Refill   Associated symptoms include arthralgias  The following portions of the patient's history were reviewed and updated as appropriate: allergies, current medications, past family history, past medical history, past social history, past surgical history and problem list     Review of Systems   Constitutional: Negative  HENT: Negative  Eyes: Negative  Respiratory: Negative  Cardiovascular: Negative  Gastrointestinal: Negative  Endocrine: Negative  Genitourinary: Negative  Musculoskeletal: Positive for arthralgias and back pain  Skin: Negative  Allergic/Immunologic: Negative  Neurological: Negative  Hematological: Negative  Psychiatric/Behavioral: Negative  Objective:      /76 (BP Location: Right arm, Patient Position: Sitting, Cuff Size: Large)   Pulse 72   Resp 16   Ht 5' 5 5" (1 664 m)   Wt 93 4 kg (206 lb)   SpO2 98%   BMI 33 76 kg/m²          Physical Exam   Cardiovascular: Normal rate and regular rhythm  Pulmonary/Chest: Effort normal and breath sounds normal    Musculoskeletal: He exhibits tenderness  Pain over the medial calf  No pain over Achilles

## 2019-01-03 ENCOUNTER — TELEPHONE (OUTPATIENT)
Dept: FAMILY MEDICINE CLINIC | Facility: CLINIC | Age: 39
End: 2019-01-03

## 2019-01-03 NOTE — TELEPHONE ENCOUNTER
Patient called stating his sexual partner was diagnosed and treated for a yeast infection and he believes he has one now too because he has a spot and is itchy  Requests a prescribed medication or advice on something he could buy over the counter

## 2019-01-25 ENCOUNTER — OFFICE VISIT (OUTPATIENT)
Dept: FAMILY MEDICINE CLINIC | Facility: CLINIC | Age: 39
End: 2019-01-25
Payer: COMMERCIAL

## 2019-01-25 VITALS
WEIGHT: 214.8 LBS | SYSTOLIC BLOOD PRESSURE: 132 MMHG | HEIGHT: 66 IN | BODY MASS INDEX: 34.52 KG/M2 | DIASTOLIC BLOOD PRESSURE: 80 MMHG | HEART RATE: 76 BPM

## 2019-01-25 DIAGNOSIS — Z00.00 WELL ADULT EXAM: Primary | ICD-10-CM

## 2019-01-25 PROCEDURE — 99396 PREV VISIT EST AGE 40-64: CPT | Performed by: FAMILY MEDICINE

## 2019-01-25 RX ORDER — PHENTERMINE HYDROCHLORIDE 37.5 MG/1
37.5 TABLET ORAL DAILY
Qty: 30 TABLET | Refills: 2 | Status: SHIPPED | OUTPATIENT
Start: 2019-01-25 | End: 2019-07-09 | Stop reason: SDUPTHER

## 2019-01-25 NOTE — PROGRESS NOTES
Assessment/Plan:  Patient has use contrave as well as saxendra without improvement  Patient phentermine     Diagnoses and all orders for this visit:    Well adult exam          Subjective:      Patient ID: Jojo Hays is a 34 y o  male  Patient is here for wellness exam   Patient did notice some hair loss with Topamax and stop medication  Patient otherwise feels well  No chest pain or shortness of breath or problems with urination or defecation  Patient is use ibuprofen multiple times recently without any allergic reactions  This will be removed from patient's allergy list         The following portions of the patient's history were reviewed and updated as appropriate: allergies, current medications, past family history, past medical history, past social history, past surgical history and problem list     Review of Systems   Constitutional: Negative  HENT: Negative  Eyes: Negative  Respiratory: Negative  Cardiovascular: Negative  Gastrointestinal: Negative  Endocrine: Negative  Genitourinary: Negative  Musculoskeletal: Negative  Skin: Negative  Allergic/Immunologic: Negative  Neurological: Negative  Hematological: Negative  Psychiatric/Behavioral: Negative  Objective:      /80 (BP Location: Right arm, Patient Position: Sitting, Cuff Size: Large)   Pulse 76   Ht 5' 5 5" (1 664 m)   Wt 97 4 kg (214 lb 12 8 oz)   BMI 35 20 kg/m²          Physical Exam   Constitutional: He is oriented to person, place, and time  He appears well-developed and well-nourished  No distress  HENT:   Head: Normocephalic  Right Ear: External ear normal    Left Ear: External ear normal    Mouth/Throat: Oropharynx is clear and moist  No oropharyngeal exudate  Eyes: Pupils are equal, round, and reactive to light  EOM are normal  Right eye exhibits no discharge  Left eye exhibits no discharge  No scleral icterus  Neck: Normal range of motion  Neck supple   No thyromegaly present  Cardiovascular: Normal rate, regular rhythm, normal heart sounds and intact distal pulses  Exam reveals no gallop and no friction rub  No murmur heard  Pulmonary/Chest: Effort normal and breath sounds normal  No respiratory distress  He has no wheezes  He has no rales  He exhibits no tenderness  Abdominal: Soft  Bowel sounds are normal  He exhibits no distension  There is no tenderness  There is no rebound and no guarding  Musculoskeletal: Normal range of motion  He exhibits no edema or tenderness  Lymphadenopathy:     He has no cervical adenopathy  Neurological: He is oriented to person, place, and time  No cranial nerve deficit  He exhibits normal muscle tone  Coordination normal    Skin: Skin is warm and dry  No rash noted  He is not diaphoretic  No erythema  No pallor  Psychiatric: He has a normal mood and affect  His behavior is normal  Judgment and thought content normal    Nursing note and vitals reviewed

## 2019-02-15 ENCOUNTER — TELEPHONE (OUTPATIENT)
Dept: FAMILY MEDICINE CLINIC | Facility: CLINIC | Age: 39
End: 2019-02-15

## 2019-02-15 DIAGNOSIS — L65.9 HAIR LOSS: ICD-10-CM

## 2019-02-15 DIAGNOSIS — E66.9 OBESITY, UNSPECIFIED CLASSIFICATION, UNSPECIFIED OBESITY TYPE, UNSPECIFIED WHETHER SERIOUS COMORBIDITY PRESENT: Primary | ICD-10-CM

## 2019-03-19 ENCOUNTER — APPOINTMENT (OUTPATIENT)
Dept: LAB | Age: 39
End: 2019-03-19
Payer: COMMERCIAL

## 2019-03-21 ENCOUNTER — TELEPHONE (OUTPATIENT)
Dept: FAMILY MEDICINE CLINIC | Facility: CLINIC | Age: 39
End: 2019-03-21

## 2019-03-21 NOTE — TELEPHONE ENCOUNTER
Spoke with patient in length about results  I suggested some diet modifications and told him to make sure he has a 3 month F/U in April

## 2019-03-21 NOTE — TELEPHONE ENCOUNTER
Pt called because he saw his bw results  He said he looked up online and said they are not good so he's worried no one has called him  I told him Dr Jack Deng is out of the office, so unless one of the other docs is able to look at it   Please call pt

## 2019-04-02 ENCOUNTER — OFFICE VISIT (OUTPATIENT)
Dept: FAMILY MEDICINE CLINIC | Facility: CLINIC | Age: 39
End: 2019-04-02
Payer: COMMERCIAL

## 2019-04-02 VITALS
WEIGHT: 209 LBS | SYSTOLIC BLOOD PRESSURE: 126 MMHG | DIASTOLIC BLOOD PRESSURE: 86 MMHG | HEIGHT: 66 IN | BODY MASS INDEX: 33.59 KG/M2 | TEMPERATURE: 99.1 F

## 2019-04-02 DIAGNOSIS — J01.00 ACUTE NON-RECURRENT MAXILLARY SINUSITIS: Primary | ICD-10-CM

## 2019-04-02 PROCEDURE — 3008F BODY MASS INDEX DOCD: CPT | Performed by: FAMILY MEDICINE

## 2019-04-02 PROCEDURE — 1036F TOBACCO NON-USER: CPT | Performed by: FAMILY MEDICINE

## 2019-04-02 PROCEDURE — 99213 OFFICE O/P EST LOW 20 MIN: CPT | Performed by: FAMILY MEDICINE

## 2019-04-02 RX ORDER — FINASTERIDE 1 MG/1
1 TABLET, FILM COATED ORAL DAILY
Refills: 11 | COMMUNITY
Start: 2019-03-10 | End: 2021-06-28 | Stop reason: SDUPTHER

## 2019-04-02 RX ORDER — AMOXICILLIN AND CLAVULANATE POTASSIUM 875; 125 MG/1; MG/1
1 TABLET, FILM COATED ORAL EVERY 12 HOURS SCHEDULED
Qty: 14 TABLET | Refills: 0 | Status: SHIPPED | OUTPATIENT
Start: 2019-04-02 | End: 2019-04-09

## 2019-05-13 DIAGNOSIS — Z00.00 WELL ADULT EXAM: ICD-10-CM

## 2019-05-14 RX ORDER — PHENTERMINE HYDROCHLORIDE 37.5 MG/1
TABLET ORAL
Qty: 30 TABLET | Refills: 2 | OUTPATIENT
Start: 2019-05-14

## 2019-05-20 ENCOUNTER — TELEPHONE (OUTPATIENT)
Dept: PAIN MEDICINE | Facility: CLINIC | Age: 39
End: 2019-05-20

## 2019-06-07 NOTE — TELEPHONE ENCOUNTER
[address]   OPERATIVE REPORT    Name:  Myriam Duke  MR#:  [id number]  :  1982  ACCOUNT #:  [de-identified]  DATE OF SERVICE:  2019    PREOPERATIVE DIAGNOSIS:  Desires elective body contouring surgery. POSTOPERATIVE DIAGNOSIS:  Desires elective body contouring surgery. PROCEDURE PERFORMED:  Abdominoplasty with muscle fascial plication. SURGEON:  Ye Mccarthy MD    ASSISTANT:  Antoinette Joseph. STAFF:  Room #4, 7th floor at [address] [place] [address]. ANESTHESIA:  General.    COMPLICATIONS:  None. SPECIMENS REMOVED:  Skin, anterior abdominal wall, 1381 g discarded. IMPLANTS:  None. ESTIMATED BLOOD LOSS:  Approximately 25 mL. IV FLUIDS:  Less than or equal to 1000 mL. DRAINS:  15-Trinidadian round fluted drains x2 as previously described. FINDINGS:  Normal.    INDICATIONS FOR PROCEDURE:  The patient is a pleasant 77-year-old female seen in the [address] [place] [address] with a desire to undergo elective body contouring surgery. She was specifically interested in addressing changes that have occurred to her abdominal wall and her anterior trunk from time, gravity, and the birth of 2 children. Despite her efforts with diet and exercise, she was unable to obtain the look she desires and was interested in achieving her goals through elective surgery. She comes in today for this elective abdominoplasty. OPERATIVE PROCEDURE:  After appropriate consent was obtained, preoperative markings were placed. She was taken to the operating room and placed on the operating table in the supine position. Satisfactory general endotracheal anesthesia was obtained. SCD devices were placed per routine. Our local anesthesia solution was not injected down in the skin prior to incision placement.   After abdominal wall was sterilely prepped and draped, a #10 scalpel blade was used to make an incision based on her preoperative markings around the umbilicus S/w pt he has been doing Mariluz based therapy as prescribed, and he has been having increased pain and numbness in his neck and shoulders  PT notes were sent over today  Pt wondering what the next step is, can he get an MRI then get an injectio he is wondering? Told pt I would s/w JW and get back to him with his recommendations  Please advise, thanks  Med list updated in EPIC  and the low transverse abdominal wall. The electrocautery was then used to dissect through the subcutaneous tissues down to the rectus fascia. We then dissected in the cephalad direction to the xiphoid in the midline and the costal margins bilaterally. The anterior rectus fascia was then plicated with 0 Prolene suture in a running fashion from the symphysis pubis to the umbilicus in 2 layers, and from the umbilicus to the xiphoid in 2 layers as well. The total width of plication at the level of the umbilicus was approximately 7-8 cm on either side of the midline. At this point, 0.25% plain Marcaine was directly injected x30 mL into the anterior rectus fascia to aid in postoperative pain management. She was then flexed at the waist to identify the amount of skin to be removed. It was marked with a marking pen and inspected for symmetry. It was incised with a #10 scalpel blade and removed with the electrocautery. At this point, Exparel, long-acting Marcaine/liposome 20 mL was diluted with an additional 60 mL of injectable saline. The entire 80 mL mixture was then injected into the anterior rectus fascia and around the lateral abdominal incision wound to aid in postoperative pain management. 15-Maltese round fluted drains x2 were brought out laterally and secured with a 3-0 silk suture. The abdominal wound was then closed in 3 layers reapproximating Morris's fascia with a 2-0 PDS, and the dermis with 3-0 Vicryl, and the skin with 3-0 Monocryl. The umbilicus was brought out through its new position and secured in 2 layers with 3-0 Vicryl and 4-0 Monocryl suture. Sterile dressings with Xeroform gauze, 4 x 4 gauze, and an abdominal binder were placed. At the end of the procedure, sponge and needle counts were reported as correct. She was awakened, extubated, and transferred to the recovery room in satisfactory and stable condition.   She will be discharged home today in the care of her family with prescriptions and instructions and will follow up with me within 1 week. DISPOSITION:  Discharged home.         Mirza Crowell MD      [email or web address]/  D:  06/07/2019 10:08  T:  06/07/2019 12:37  JOB #:  [id number]  CC:  Cedrick Carvajal MD

## 2019-06-18 ENCOUNTER — TELEPHONE (OUTPATIENT)
Dept: PAIN MEDICINE | Facility: CLINIC | Age: 39
End: 2019-06-18

## 2019-06-19 ENCOUNTER — HOSPITAL ENCOUNTER (OUTPATIENT)
Dept: RADIOLOGY | Facility: CLINIC | Age: 39
Discharge: HOME | End: 2019-06-19
Admitting: ANESTHESIOLOGY
Payer: COMMERCIAL

## 2019-06-19 VITALS
HEART RATE: 68 BPM | OXYGEN SATURATION: 97 % | SYSTOLIC BLOOD PRESSURE: 128 MMHG | TEMPERATURE: 98.9 F | DIASTOLIC BLOOD PRESSURE: 85 MMHG | RESPIRATION RATE: 20 BRPM

## 2019-06-19 DIAGNOSIS — M54.12 CERVICAL RADICULOPATHY: ICD-10-CM

## 2019-06-19 PROCEDURE — 62321 NJX INTERLAMINAR CRV/THRC: CPT | Performed by: ANESTHESIOLOGY

## 2019-06-19 RX ORDER — LIDOCAINE HYDROCHLORIDE 10 MG/ML
5 INJECTION, SOLUTION EPIDURAL; INFILTRATION; INTRACAUDAL; PERINEURAL ONCE
Status: COMPLETED | OUTPATIENT
Start: 2019-06-19 | End: 2019-06-19

## 2019-06-19 RX ORDER — PAPAVERINE HCL 150 MG
10 CAPSULE, EXTENDED RELEASE ORAL ONCE
Status: COMPLETED | OUTPATIENT
Start: 2019-06-19 | End: 2019-06-19

## 2019-06-19 RX ADMIN — DEXAMETHASONE SODIUM PHOSPHATE 10 MG: 10 INJECTION, SOLUTION INTRAMUSCULAR; INTRAVENOUS at 08:31

## 2019-06-19 RX ADMIN — LIDOCAINE HYDROCHLORIDE 3 ML: 10 INJECTION, SOLUTION EPIDURAL; INFILTRATION; INTRACAUDAL; PERINEURAL at 08:28

## 2019-06-19 RX ADMIN — IOHEXOL 1 ML: 300 INJECTION, SOLUTION INTRAVENOUS at 08:30

## 2019-06-26 ENCOUNTER — TELEPHONE (OUTPATIENT)
Dept: PAIN MEDICINE | Facility: CLINIC | Age: 39
End: 2019-06-26

## 2019-07-09 ENCOUNTER — OFFICE VISIT (OUTPATIENT)
Dept: FAMILY MEDICINE CLINIC | Facility: CLINIC | Age: 39
End: 2019-07-09
Payer: COMMERCIAL

## 2019-07-09 VITALS
SYSTOLIC BLOOD PRESSURE: 118 MMHG | DIASTOLIC BLOOD PRESSURE: 90 MMHG | BODY MASS INDEX: 33.91 KG/M2 | TEMPERATURE: 97.8 F | HEIGHT: 66 IN | WEIGHT: 211 LBS

## 2019-07-09 DIAGNOSIS — Z00.00 WELL ADULT EXAM: ICD-10-CM

## 2019-07-09 DIAGNOSIS — A08.4 VIRAL GASTROENTERITIS: Primary | ICD-10-CM

## 2019-07-09 DIAGNOSIS — M50.20 CERVICAL HERNIATED DISC: ICD-10-CM

## 2019-07-09 PROCEDURE — 99213 OFFICE O/P EST LOW 20 MIN: CPT | Performed by: FAMILY MEDICINE

## 2019-07-09 RX ORDER — PHENTERMINE HYDROCHLORIDE 37.5 MG/1
37.5 TABLET ORAL DAILY
Qty: 30 TABLET | Refills: 2 | Status: SHIPPED | OUTPATIENT
Start: 2019-07-09 | End: 2019-07-23

## 2019-07-09 RX ORDER — TIZANIDINE 4 MG/1
4 TABLET ORAL
Qty: 30 TABLET | Refills: 2 | Status: SHIPPED | OUTPATIENT
Start: 2019-07-09 | End: 2019-07-23

## 2019-07-09 NOTE — PROGRESS NOTES
Assessment/Plan:  Note given for patient to return to work tomorrow  Diagnoses and all orders for this visit:    Viral gastroenteritis    Cervical herniated disc  -     tiZANidine (ZANAFLEX) 4 mg tablet; Take 1 tablet (4 mg total) by mouth daily at bedtime    Well adult exam  -     phentermine (ADIPEX-P) 37 5 MG tablet; Take 1 tablet (37 5 mg total) by mouth daily          Subjective:      Patient ID: Tiara Chase is a 37 y o  male  Patient is here due to probable viral gastroenteritis the which began Sunday  Patient had multiple bouts of diarrhea as well as fatigue and chills  This has improved overall  Patient also, patient has right cervical pain  No radicular symptoms  Patient wishes medication for this  The following portions of the patient's history were reviewed and updated as appropriate: allergies, current medications, past family history, past medical history, past social history, past surgical history and problem list     Review of Systems   Constitutional: Negative  HENT: Negative  Eyes: Negative  Respiratory: Negative  Cardiovascular: Negative  Gastrointestinal: Positive for diarrhea  Endocrine: Negative  Genitourinary: Negative  Musculoskeletal: Positive for neck pain and neck stiffness  Skin: Negative  Allergic/Immunologic: Negative  Neurological: Negative  Hematological: Negative  Psychiatric/Behavioral: Negative  Objective:      /90 (BP Location: Left arm, Patient Position: Sitting, Cuff Size: Adult)   Temp 97 8 °F (36 6 °C) (Tympanic)   Ht 5' 5 5" (1 664 m)   Wt 95 7 kg (211 lb)   BMI 34 58 kg/m²          Physical Exam   Constitutional: He is oriented to person, place, and time  He appears well-developed and well-nourished  No distress  HENT:   Head: Normocephalic  Right Ear: External ear normal    Left Ear: External ear normal    Mouth/Throat: Oropharynx is clear and moist  No oropharyngeal exudate     Eyes: Pupils are equal, round, and reactive to light  EOM are normal  Right eye exhibits no discharge  Left eye exhibits no discharge  No scleral icterus  Neck: No thyromegaly present  Cardiovascular: Normal rate, regular rhythm, normal heart sounds and intact distal pulses  Exam reveals no gallop and no friction rub  No murmur heard  Pulmonary/Chest: Effort normal and breath sounds normal  No respiratory distress  He has no wheezes  He has no rales  He exhibits no tenderness  Musculoskeletal: He exhibits tenderness  He exhibits no edema  Right cervical pain with palpation with decreased range of motion   Lymphadenopathy:     He has no cervical adenopathy  Neurological: He is oriented to person, place, and time  No cranial nerve deficit  He exhibits normal muscle tone  Coordination normal    Skin: Skin is warm and dry  No rash noted  He is not diaphoretic  No erythema  No pallor  Psychiatric: He has a normal mood and affect  His behavior is normal  Judgment and thought content normal    Nursing note and vitals reviewed

## 2019-07-23 ENCOUNTER — OFFICE VISIT (OUTPATIENT)
Dept: FAMILY MEDICINE CLINIC | Facility: CLINIC | Age: 39
End: 2019-07-23
Payer: COMMERCIAL

## 2019-07-23 VITALS
RESPIRATION RATE: 16 BRPM | WEIGHT: 206 LBS | HEIGHT: 66 IN | BODY MASS INDEX: 33.11 KG/M2 | HEART RATE: 61 BPM | OXYGEN SATURATION: 98 % | SYSTOLIC BLOOD PRESSURE: 122 MMHG | DIASTOLIC BLOOD PRESSURE: 64 MMHG

## 2019-07-23 DIAGNOSIS — M54.12 CERVICAL RADICULOPATHY: ICD-10-CM

## 2019-07-23 DIAGNOSIS — M25.561 ACUTE PAIN OF RIGHT KNEE: Primary | ICD-10-CM

## 2019-07-23 PROCEDURE — 1036F TOBACCO NON-USER: CPT | Performed by: FAMILY MEDICINE

## 2019-07-23 PROCEDURE — 99213 OFFICE O/P EST LOW 20 MIN: CPT | Performed by: FAMILY MEDICINE

## 2019-07-23 PROCEDURE — 3008F BODY MASS INDEX DOCD: CPT | Performed by: FAMILY MEDICINE

## 2019-07-23 NOTE — PROGRESS NOTES
Assessment/Plan:  The patient with probable meniscal tear right knee medially  The get MRI of right knee  Patient may continue with ice  Diagnoses and all orders for this visit:    Acute pain of right knee  -     XR knee 3 vw right non injury; Future  -     MRI knee right  wo contrast; Future  -     piroxicam (FELDENE) 20 mg capsule; Take 1 capsule (20 mg total) by mouth daily    Cervical radiculopathy  -     Misc  Devices (CERVICAL TRACTION) KIT; by Does not apply route 2 (two) times a day            Subjective:        Patient ID: Naga Sharma is a 37 y o  male  Patient is here with right knee pain over the past week  No direct injury noted at this time  Patient has had previous ACL repair on that knee roughly 15+ years ago  Patient with full extension but some pain associated with this  Patient has reduced flexion  Patient notices pain when walking down steps  No swelling noted  Patient did use some ice intermittently  Patient is noticing some elbow pain as well as some numbness in his hand when he leans on his hand/arm  The following portions of the patient's history were reviewed and updated as appropriate: allergies, current medications, past family history, past medical history, past social history, past surgical history and problem list       Review of Systems   Constitutional: Negative  HENT: Negative  Eyes: Negative  Respiratory: Negative  Cardiovascular: Negative  Gastrointestinal: Negative  Endocrine: Negative  Genitourinary: Negative  Musculoskeletal: Positive for arthralgias  Skin: Negative  Allergic/Immunologic: Negative  Neurological: Positive for numbness  Hematological: Negative  Psychiatric/Behavioral: Negative  Objective:      BMI Counseling: Body mass index is 33 76 kg/m²  Discussed the patient's BMI with him  The BMI is above average  BMI counseling and education was provided to the patient   Nutrition recommendations include reducing portion sizes  /64 (BP Location: Right arm, Patient Position: Sitting, Cuff Size: Adult)   Pulse 61   Resp 16   Ht 5' 5 5" (1 664 m)   Wt 93 4 kg (206 lb)   SpO2 98%   BMI 33 76 kg/m²          Physical Exam   Constitutional: He appears well-developed and well-nourished  Musculoskeletal: He exhibits tenderness  Right knee no swelling  Patient does have some pain over patella tendon as well as the joint line medially and laterally  Positive Apley test   Negative Lachman test    Neurological: He is alert  Skin: Skin is warm and dry  Nursing note and vitals reviewed

## 2019-09-09 ENCOUNTER — OFFICE VISIT (OUTPATIENT)
Dept: FAMILY MEDICINE CLINIC | Facility: CLINIC | Age: 39
End: 2019-09-09
Payer: COMMERCIAL

## 2019-09-09 VITALS
TEMPERATURE: 97.2 F | DIASTOLIC BLOOD PRESSURE: 76 MMHG | HEIGHT: 66 IN | WEIGHT: 206 LBS | SYSTOLIC BLOOD PRESSURE: 106 MMHG | BODY MASS INDEX: 33.11 KG/M2

## 2019-09-09 DIAGNOSIS — J01.00 ACUTE NON-RECURRENT MAXILLARY SINUSITIS: Primary | ICD-10-CM

## 2019-09-09 PROCEDURE — 3008F BODY MASS INDEX DOCD: CPT | Performed by: FAMILY MEDICINE

## 2019-09-09 PROCEDURE — 99213 OFFICE O/P EST LOW 20 MIN: CPT | Performed by: FAMILY MEDICINE

## 2019-09-09 RX ORDER — AMOXICILLIN AND CLAVULANATE POTASSIUM 875; 125 MG/1; MG/1
1 TABLET, FILM COATED ORAL EVERY 12 HOURS SCHEDULED
Qty: 14 TABLET | Refills: 0 | Status: SHIPPED | OUTPATIENT
Start: 2019-09-09 | End: 2019-09-16

## 2019-09-09 RX ORDER — GUAIFENESIN AND CODEINE PHOSPHATE 100; 10 MG/5ML; MG/5ML
5 SOLUTION ORAL 3 TIMES DAILY PRN
Qty: 120 ML | Refills: 0 | Status: SHIPPED | OUTPATIENT
Start: 2019-09-09 | End: 2019-09-18 | Stop reason: SDUPTHER

## 2019-09-09 NOTE — PROGRESS NOTES
Assessment/Plan:       There are no diagnoses linked to this encounter  Subjective:        Patient ID: Martir Perez is a 37 y o  male  Patient is here with cough sinus congestion rhinorrhea over the past week  Patient with sick contacts  No vomiting or diarrhea associated with this  Patient with green nasal discharge  Patient has tried test in the from over-the-counter the medications without significant improvement  The following portions of the patient's history were reviewed and updated as appropriate: allergies, current medications, past family history, past medical history, past social history, past surgical history and problem list       Review of Systems   Constitutional: Negative for fever  HENT: Positive for congestion, postnasal drip, rhinorrhea, sinus pressure and sinus pain  Respiratory: Positive for cough  Objective:      BMI Counseling: Body mass index is 33 76 kg/m²  Discussed the patient's BMI with him  The BMI is above average  BMI counseling and education was provided to the patient  Nutrition recommendations include reducing portion sizes  Depression Screening Follow-up Plan: Patient's depression screening was positive with a PHQ-2 score of 0  Their PHQ-9 score was   Patient assessed for underlying major depression  They have no active suicidal ideations  Brief counseling provided and recommend additional follow-up/re-evaluation next office visit  /76 (BP Location: Right arm, Patient Position: Sitting, Cuff Size: Adult)   Temp (!) 97 2 °F (36 2 °C) (Tympanic)   Ht 5' 5 5" (1 664 m)   Wt 93 4 kg (206 lb)   BMI 33 76 kg/m²          Physical Exam   Constitutional: He is oriented to person, place, and time  He appears well-developed and well-nourished  No distress  HENT:   Head: Normocephalic  Right Ear: External ear normal    Left Ear: External ear normal    Mouth/Throat: Oropharyngeal exudate present     Eyes: Pupils are equal, round, and reactive to light  EOM are normal  Right eye exhibits no discharge  Left eye exhibits no discharge  No scleral icterus  Neck: Normal range of motion  Neck supple  No thyromegaly present  Cardiovascular: Normal rate, regular rhythm, normal heart sounds and intact distal pulses  Exam reveals no gallop and no friction rub  No murmur heard  Pulmonary/Chest: Effort normal and breath sounds normal  No respiratory distress  He has no wheezes  He has no rales  He exhibits no tenderness  Abdominal: Soft  Bowel sounds are normal  He exhibits no distension  There is no tenderness  There is no rebound and no guarding  Musculoskeletal: Normal range of motion  He exhibits no edema or tenderness  Lymphadenopathy:     He has no cervical adenopathy  Neurological: He is oriented to person, place, and time  No cranial nerve deficit  He exhibits normal muscle tone  Coordination normal    Skin: Skin is warm and dry  No rash noted  He is not diaphoretic  No erythema  No pallor  Psychiatric: He has a normal mood and affect   His behavior is normal  Judgment and thought content normal

## 2019-09-11 ENCOUNTER — TELEPHONE (OUTPATIENT)
Dept: FAMILY MEDICINE CLINIC | Facility: CLINIC | Age: 39
End: 2019-09-11

## 2019-09-11 NOTE — TELEPHONE ENCOUNTER
Call patient  Explained that MRI is not being covered    Physical therapy recommended for 6 weeks versus seeing Orthopedics

## 2019-09-11 NOTE — TELEPHONE ENCOUNTER
Pt's insurance denied the auth for MRI knee  As per [facility] lower extremity MRI guidelines, "this test cannot be authorized based on the information submitted  The information submitted did not indicate the patient has had 4 weeks of provider-directed therapy  This therapy can/should consist of medications and other measures not including medications such as physical therapy, home exercises, etc  The patient must then have a f u visit to discuss s/s s/p therapies "     A physician can discuss the case with a  @ [phone]  Please advise       (official written determination not yet received, this notification came via telephone message)

## 2019-09-12 NOTE — TELEPHONE ENCOUNTER
SPOKE TO PT   HE IS NOT HAPPY ABOUT HIS INS DENIAL  HE IS GOING TO CALL HIS INS COMPANY  HE WILL CALL US BACK IF HE NEEDS ANYTHING FROM US

## 2019-09-18 DIAGNOSIS — J01.00 ACUTE NON-RECURRENT MAXILLARY SINUSITIS: ICD-10-CM

## 2019-09-18 RX ORDER — GUAIFENESIN AND CODEINE PHOSPHATE 100; 10 MG/5ML; MG/5ML
5 SOLUTION ORAL 3 TIMES DAILY PRN
Qty: 120 ML | Refills: 0 | Status: SHIPPED | OUTPATIENT
Start: 2019-09-18 | End: 2020-01-22

## 2019-09-18 RX ORDER — DOXYCYCLINE HYCLATE 100 MG/1
100 CAPSULE ORAL EVERY 12 HOURS SCHEDULED
Qty: 28 CAPSULE | Refills: 0 | Status: SHIPPED | OUTPATIENT
Start: 2019-09-18 | End: 2019-10-02

## 2019-09-18 NOTE — TELEPHONE ENCOUNTER
PT CALLED, HE IS STILL NOT DOING WELL, HE WAS IN ON 09/09/19 FOR A SINUS INFECTION  HE WOULD LIKE MORE COUGH MEDICATION  PLEASE ADVISE    PHARMACY:  [address]

## 2019-10-16 ENCOUNTER — HOSPITAL ENCOUNTER (OUTPATIENT)
Dept: RADIOLOGY | Facility: HOSPITAL | Age: 39
Discharge: HOME | End: 2019-10-16
Payer: COMMERCIAL

## 2019-10-16 DIAGNOSIS — M25.561 ACUTE PAIN OF RIGHT KNEE: ICD-10-CM

## 2019-10-16 PROCEDURE — 73564 X-RAY EXAM KNEE 4 OR MORE: CPT

## 2019-11-05 ENCOUNTER — TELEPHONE (OUTPATIENT)
Dept: FAMILY MEDICINE CLINIC | Facility: CLINIC | Age: 39
End: 2019-11-05

## 2019-11-05 NOTE — TELEPHONE ENCOUNTER
Pt called, he is checking on his authorization for his MRI of his Right Knee, ( It's from 07/23/19) he states he is in a lot of pain    Please advise patient, Thank You

## 2019-11-06 DIAGNOSIS — M25.561 ACUTE PAIN OF RIGHT KNEE: Primary | ICD-10-CM

## 2019-11-06 NOTE — TELEPHONE ENCOUNTER
I has a lengthy discussion with the pt regarding this matter  I explained to him about the denial and the insurance requiring 4-6 weeks worth of physical therapy before they'll approve the MRI  He was asking me since he had the Xray done, which was normal, if sending that would make a difference  I explained to him based off their guideline/requirements for MRI coverage, sending the XR wouldn't make a difference  He doesn't understand how sending him to physical therapy would help issues with cartilage, etc  I agreed with him and generally stated I'm not sure either in regards to that aspect  He is willing to go the route of seeing ortho and being evaluated by them  Order for ortho entered into chart

## 2019-12-11 DIAGNOSIS — M25.561 ACUTE PAIN OF RIGHT KNEE: Primary | ICD-10-CM

## 2019-12-11 NOTE — TELEPHONE ENCOUNTER
PLEASE TALK TO ME ABOUT THIS ONE  HE RESCHEDULED THE MRI FOR "TOMORROW"  JOSE AT THE MRI DEPT CALLED TO FIND OUT IF WE ARE WORKING ON A PRIOR AUTH  HER #  [phone]

## 2019-12-11 NOTE — TELEPHONE ENCOUNTER
I spoke with the patient  He informed me he will be going for the MRI on 12/17  He wants to know why another auth isn't being worked on  I explained to him, in the beginning of November I had explained to him an Judy Ni is not going to be done because of the previous denial  The insurance is requiring 4-6 weeks of PT  Dr Andrew Parmar also referred him to orthopedics  They (ortho) are to exam him and determine the next course of action  He doesn't like that he has to go to ortho or have PT - he feels PT wont work  "He needs to be scoped to fix my issue"  I explained to him again I will try for an auth but I can almost guarantee it will be denied again because not meeting the requirements       He indicated he will just contact the insurance again because the rep he spoke to for over an hour implied it shouldn't be a problem to get the approval

## 2019-12-11 NOTE — TELEPHONE ENCOUNTER
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

## 2019-12-12 RX ORDER — METHYLPREDNISOLONE 4 MG/1
TABLET ORAL
Qty: 21 EACH | Refills: 0
Start: 2019-12-12 | End: 2020-01-22

## 2019-12-16 ENCOUNTER — TELEPHONE (OUTPATIENT)
Dept: FAMILY MEDICINE CLINIC | Facility: CLINIC | Age: 39
End: 2019-12-16

## 2019-12-16 NOTE — TELEPHONE ENCOUNTER
I spoke with the pt regarding the new denial I had received for the MRI knee again  This time, in addition to the insurance requiring physical therapy, they need a more recent OV note indicating a more updated physical exam  He was L/S in July for the issue  While I was speaking with the patient, he stated he now has a lump in his calf in addition to noticeable swelling  The lump is approximately the size of a quarter  Denies any redness  He is wondering if it is possible for the lump to be caused by a piece of the cartilage that broke off? He said straight out he cannot afford to go through with physical therapy and whether coming in here or going to ortho - he'd still have to be seeing a doctor and paying copays and whatnot - rather than getting anything done about it  Any ideas of how to proceed from here?

## 2019-12-17 ENCOUNTER — TELEPHONE (OUTPATIENT)
Dept: OBGYN CLINIC | Facility: HOSPITAL | Age: 39
End: 2019-12-17

## 2019-12-17 ENCOUNTER — TRANSCRIBE ORDERS (OUTPATIENT)
Dept: ADMINISTRATIVE | Facility: HOSPITAL | Age: 39
End: 2019-12-17

## 2019-12-17 ENCOUNTER — OFFICE VISIT (OUTPATIENT)
Dept: OBGYN CLINIC | Facility: CLINIC | Age: 39
End: 2019-12-17
Payer: COMMERCIAL

## 2019-12-17 ENCOUNTER — HOSPITAL ENCOUNTER (OUTPATIENT)
Dept: NON INVASIVE DIAGNOSTICS | Facility: HOSPITAL | Age: 39
Discharge: HOME | End: 2019-12-17
Payer: COMMERCIAL

## 2019-12-17 VITALS — WEIGHT: 219 LBS | HEIGHT: 66 IN | BODY MASS INDEX: 35.2 KG/M2

## 2019-12-17 DIAGNOSIS — M79.661 RIGHT CALF PAIN: Primary | ICD-10-CM

## 2019-12-17 DIAGNOSIS — R60.9 EDEMA, UNSPECIFIED TYPE: Primary | ICD-10-CM

## 2019-12-17 DIAGNOSIS — M79.661 RIGHT CALF PAIN: ICD-10-CM

## 2019-12-17 DIAGNOSIS — G89.29 CHRONIC PAIN OF RIGHT KNEE: ICD-10-CM

## 2019-12-17 DIAGNOSIS — M25.861 CYST OF KNEE JOINT, RIGHT: ICD-10-CM

## 2019-12-17 DIAGNOSIS — M25.561 CHRONIC PAIN OF RIGHT KNEE: ICD-10-CM

## 2019-12-17 PROCEDURE — 93971 EXTREMITY STUDY: CPT

## 2019-12-17 PROCEDURE — 99243 OFF/OP CNSLTJ NEW/EST LOW 30: CPT | Performed by: ORTHOPAEDIC SURGERY

## 2019-12-17 NOTE — PROGRESS NOTES
CC:  Right knee and calf pain        Assessment:  Right calf pain-rule out DVT    Plan : This patient has had chronic knee pain which needs to be investigated further  We must determine if this is degenerative arthritis from his multiple injuries and his previous surgery or does he have something new torn inside his knee  He has been denied twice for an MRI and therefore we must try at least some type of conservative treatment first including home exercises, possible cortisone injection, and possible knee sleeve or  brace  More importantly now he has this swollen lump on the medial aspect of his right proximal calf and we must rule out a blood clot as the cause of these symptoms  For that reason I sent him for an emergency duplex Doppler ultrasound of his right leg and then I will find him and  contact him after this is done for discussion about further treatment    HPI:   This 47-year-old white male was sent by his family physician for consultation for chronic right knee and calf pain  Set a long history of knee problems in about 15 years ago he underwent anterior cruciate ligament repair of his right knee  He did relatively well after that until about 2 years ago when he was playing basketball when he landed awkwardly and twisted that knee and had recurrent pain and swelling  That got better with conservative treatment  Then about 6 months ago while doing kickboxing he felt a twinge in his knee and that lasted for several weeks but resolved on its own  Since that time he has had intermittent pain in various areas of his knee  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 and has difficulty with stairs, kneeling, and squatting  He has no locking or true giving way  About a week ago the pain started up again and then just a few days ago he noticed a lump on the inside of his right knee    This is quite painful and he is limping, although he continues to work   He has documented right calf strain in 2018 and also vague right knee pain after running in the same year  He thinks that he has a meniscal injury  His family doctor tried to send him for an MRI twice but was denied twice due to lack of pre testing physical therapy and conservative treatment  He states he could not afford the co-pay for physical therapy and never got an MRI    He is quite active outside of work also    PMHx:         Past Medical History:   Diagnosis Date    Epilepsy ([facility] [address] )     and recurrent seizures, one time age 13       Past Surgical History:   Procedure Laterality Date    ANTERIOR CRUCIATE LIGAMENT REPAIR Right        Family History   Problem Relation Age of Onset    Other Mother         liver transplant       Social History     Socioeconomic History    Marital status: /Civil Union     Spouse name: Not on file    Number of children: Not on file    Years of education: Not on file    Highest education level: Not on file   Occupational History    Not on file   Social Needs    Financial resource strain: Not on file    Food insecurity:     Worry: Not on file     Inability: Not on file    Transportation needs:     Medical: Not on file     Non-medical: Not on file   Tobacco Use    Smoking status: Never Smoker    Smokeless tobacco: Never Used   Substance and Sexual Activity    Alcohol use: Yes     Comment: socially    Drug use: Not on file    Sexual activity: Not on file   Lifestyle    Physical activity:     Days per week: Not on file     Minutes per session: Not on file    Stress: Not on file   Relationships    Social connections:     Talks on phone: Not on file     Gets together: Not on file     Attends Hindu service: Not on file     Active member of club or organization: Not on file     Attends meetings of clubs or organizations: Not on file     Relationship status: Not on file    Intimate partner violence:     Fear of current or ex partner: Not on file Emotionally abused: Not on file     Physically abused: Not on file     Forced sexual activity: Not on file   Other Topics Concern    Not on file   Social History Narrative    Not on file       Current Outpatient Medications   Medication Sig Dispense Refill    finasteride (PROPECIA) 1 MG tablet Take 1 mg by mouth daily  11    guaifenesin-codeine (GUAIFENESIN AC) 100-10 MG/5ML liquid Take 5 mL by mouth 3 (three) times a day as needed for cough 120 mL 0    methylPREDNISolone 4 MG tablet therapy pack Use as directed on package 21 each 0    Misc  Devices (CERVICAL TRACTION) KIT by Does not apply route 2 (two) times a day (Patient not taking: Reported on 9/9/2019) 1 each 0    piroxicam (FELDENE) 20 mg capsule Take 1 capsule (20 mg total) by mouth daily (Patient not taking: Reported on 9/9/2019) 30 capsule 1     No current facility-administered medications for this visit  Allergies: Patient has no known allergies  ROS:  Positive for leg swelling, epilepsy, multiple orthopedic issues and prior surgeries, and the new orthopedic complaints as above  The remaining 9/12 systems on the intake sheet that I reviewed were negative  PE:   Ht 5' 5 5" (1 664 m)   Wt 99 3 kg (219 lb)   BMI 35 89 kg/m²   Constitutional: The patient was  oriented to person, place, and time  Well-developed and well-nourished  In mild distress  HEENT: Vision intact  Hearing normal  Swallowing normal   Head: Normocephalic  Cardiovascular: Intact distal pulses  Pulse regular  Pulmonary/Chest: Effort normal  No respiratory distress  Neurological: Alert and oriented to person, place, and time  Skin: Skin is warm  Psychiatric: Normal mood and affect  Ortho Exam:  He was limping mildly but was not using any external aids  There was no redness, ecchymosis, or swelling noted  He had no deformity of his knee    The left knee flexed from 10° of hyperextension to 135° of flexion compared to the involved right knee that hyperextended 5° and flexed to about 120° with pain  He had no one area of point tenderness specifically over the mediolateral joint line or around the patella  He is causally tender in the popliteal fossa area his hamstrings and medially over a large cystic area about the size of a tennis ball  In the middle of this cystic area I palpate what appears to be a cord in the soft tissues  I could not do full ligamentous testing this right knee due to pain on grasping the proximal calf  No popliteal adenopathy  He had good motion, capillary refill, sensation in the toes of the right foot  Studies reviewed: Nonweightbearing right knee x-rays done in October, 2019 were personally reviewed as well as the radiologist's report  There are 2 screws in place from his previous ACL reconstruction  These nonweightbearing views did not show any joint space collapse, spur formation, loose bodies, lytic areas, or soft tissue calcification  Addendum:  I did send the patient for emergent duplex Doppler ultrasound and the technician called back after the study and stated that there is no evidence of clot in this area    The large cyst is seen and is not just pure fluid but in fact is a mixture of fluid and thicker material   This will be investigated further with an MRI

## 2019-12-17 NOTE — TELEPHONE ENCOUNTER
Patient called in stating he thinks he may have a blood clot  Would like to be seen in the office today  Rescheduled patient's appointment for the same day in [facility] but advised him that if he feels he has a blood clot he should proceed to the ER, as he is not our patient yet so medical advise cannot be given at this time rather than proceed to the ED  Patient verbalized understanding but wished to still remain on the schedule for [facility]  Directions to [facility] given in detail as well

## 2019-12-17 NOTE — PATIENT INSTRUCTIONS
Plan :  This patient has had chronic knee pain which needs to be investigated further  We must determine if this is degenerative arthritis from his multiple injuries and his previous surgery or does he have something new torn inside his knee  He has been denied twice for an MRI and therefore we must try at least some type of conservative treatment first including home exercises, possible cortisone injection, and possible knee sleeve or  brace  More importantly now he has this swollen lump on the medial aspect of his right proximal calf and we must rule out a blood clot as the cause of these symptoms    For that reason I sent him for an emergency duplex Doppler ultrasound of his right leg and then I will find him and  contact him after this is done for discussion about further treatment

## 2019-12-17 NOTE — LETTER
December 17, 2019     Clary Mckinnon, [address] [address]    Patient: Ro Mireles   YOB: 1980   Date of Visit: 12/17/2019       Dear Dr Mariposa Nelson:    Thank you for referring Ro Mireles to me for evaluation  Below are my notes for this consultation  If you have questions, please do not hesitate to call me  I look forward to following your patient along with you  Sincerely,        Maggie Marin MD        CC: No Recipients  Maggie Marin MD  12/17/2019  4:55 PM  Sign at close encounter  CC:  Right knee and calf pain        Assessment:  Right calf pain-rule out DVT    Plan : This patient has had chronic knee pain which needs to be investigated further  We must determine if this is degenerative arthritis from his multiple injuries and his previous surgery or does he have something new torn inside his knee  He has been denied twice for an MRI and therefore we must try at least some type of conservative treatment first including home exercises, possible cortisone injection, and possible knee sleeve or  brace  More importantly now he has this swollen lump on the medial aspect of his right proximal calf and we must rule out a blood clot as the cause of these symptoms  For that reason I sent him for an emergency duplex Doppler ultrasound of his right leg and then I will find him and  contact him after this is done for discussion about further treatment    HPI:   This 40-year-old white male was sent by his family physician for consultation for chronic right knee and calf pain  Set a long history of knee problems in about 15 years ago he underwent anterior cruciate ligament repair of his right knee  He did relatively well after that until about 2 years ago when he was playing basketball when he landed awkwardly and twisted that knee and had recurrent pain and swelling  That got better with conservative treatment    Then about 6 months ago while doing kickboxing he felt a twinge in his knee and that lasted for several weeks but resolved on its own  Since that time he has had intermittent pain in various areas of his knee  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 and has difficulty with stairs, kneeling, and squatting  He has no locking or true giving way  About a week ago the pain started up again and then just a few days ago he noticed a lump on the inside of his right knee  This is quite painful and he is limping, although he continues to work  He has documented right calf strain in 2018 and also vague right knee pain after running in the same year  He thinks that he has a meniscal injury  His family doctor tried to send him for an MRI twice but was denied twice due to lack of pre testing physical therapy and conservative treatment  He states he could not afford the co-pay for physical therapy and never got an MRI    He is quite active outside of work also    PMHx:         Past Medical History:   Diagnosis Date    Epilepsy ([facility] [address] )     and recurrent seizures, one time age 13       Past Surgical History:   Procedure Laterality Date    ANTERIOR CRUCIATE LIGAMENT REPAIR Right        Family History   Problem Relation Age of Onset    Other Mother         liver transplant       Social History     Socioeconomic History    Marital status: /Civil Union     Spouse name: Not on file    Number of children: Not on file    Years of education: Not on file    Highest education level: Not on file   Occupational History    Not on file   Social Needs    Financial resource strain: Not on file    Food insecurity:     Worry: Not on file     Inability: Not on file    Transportation needs:     Medical: Not on file     Non-medical: Not on file   Tobacco Use    Smoking status: Never Smoker    Smokeless tobacco: Never Used   Substance and Sexual Activity    Alcohol use: Yes     Comment: socially    Drug use: Not on file    Sexual activity: Not on file   Lifestyle    Physical activity:     Days per week: Not on file     Minutes per session: Not on file    Stress: Not on file   Relationships    Social connections:     Talks on phone: Not on file     Gets together: Not on file     Attends Yazidism service: Not on file     Active member of club or organization: Not on file     Attends meetings of clubs or organizations: Not on file     Relationship status: Not on file    Intimate partner violence:     Fear of current or ex partner: Not on file     Emotionally abused: Not on file     Physically abused: Not on file     Forced sexual activity: Not on file   Other Topics Concern    Not on file   Social History Narrative    Not on file       Current Outpatient Medications   Medication Sig Dispense Refill    finasteride (PROPECIA) 1 MG tablet Take 1 mg by mouth daily  11    guaifenesin-codeine (GUAIFENESIN AC) 100-10 MG/5ML liquid Take 5 mL by mouth 3 (three) times a day as needed for cough 120 mL 0    methylPREDNISolone 4 MG tablet therapy pack Use as directed on package 21 each 0    Misc  Devices (CERVICAL TRACTION) KIT by Does not apply route 2 (two) times a day (Patient not taking: Reported on 9/9/2019) 1 each 0    piroxicam (FELDENE) 20 mg capsule Take 1 capsule (20 mg total) by mouth daily (Patient not taking: Reported on 9/9/2019) 30 capsule 1     No current facility-administered medications for this visit  Allergies: Patient has no known allergies  ROS:  Positive for leg swelling, epilepsy, multiple orthopedic issues and prior surgeries, and the new orthopedic complaints as above  The remaining 9/12 systems on the intake sheet that I reviewed were negative  PE:   Ht 5' 5 5" (1 664 m)   Wt 99 3 kg (219 lb)   BMI 35 89 kg/m²    Constitutional: The patient was  oriented to person, place, and time  Well-developed and well-nourished  In mild distress  HEENT: Vision intact   Hearing normal  Swallowing normal   Head: Normocephalic  Cardiovascular: Intact distal pulses  Pulse regular  Pulmonary/Chest: Effort normal  No respiratory distress  Neurological: Alert and oriented to person, place, and time  Skin: Skin is warm  Psychiatric: Normal mood and affect  Ortho Exam:  He was limping mildly but was not using any external aids  There was no redness, ecchymosis, or swelling noted  He had no deformity of his knee  The left knee flexed from 10° of hyperextension to 135° of flexion compared to the involved right knee that hyperextended 5° and flexed to about 120° with pain  He had no one area of point tenderness specifically over the mediolateral joint line or around the patella  He is causally tender in the popliteal fossa area his hamstrings and medially over a large cystic area about the size of a tennis ball  In the middle of this cystic area I palpate what appears to be a cord in the soft tissues  I could not do full ligamentous testing this right knee due to pain on grasping the proximal calf  No popliteal adenopathy  He had good motion, capillary refill, sensation in the toes of the right foot  Studies reviewed: Nonweightbearing right knee x-rays done in October, 2019 were personally reviewed as well as the radiologist's report  There are 2 screws in place from his previous ACL reconstruction  These nonweightbearing views did not show any joint space collapse, spur formation, loose bodies, lytic areas, or soft tissue calcification  Addendum:  I did send the patient for emergent duplex Doppler ultrasound and the technician called back after the study and stated that there is no evidence of clot in this area    The large cyst is seen and is not just pure fluid but in fact is a mixture of fluid and thicker material   This will be investigated further with an MRI

## 2019-12-17 NOTE — TELEPHONE ENCOUNTER
Clary Diaz from the Vascular lab calling to report the patient is negative for DVT but there are findings that she would like Dr Ila Sims to be aware of  Susy Soliz in the office took the message with CB# [phone]   Thanks

## 2019-12-17 NOTE — TELEPHONE ENCOUNTER
Would recommend patient given appoint with Orthopedics tomorrow to see if they can get the MRI through for him

## 2019-12-18 ENCOUNTER — TELEPHONE (OUTPATIENT)
Dept: OBGYN CLINIC | Facility: HOSPITAL | Age: 39
End: 2019-12-18

## 2019-12-18 PROCEDURE — 93971 EXTREMITY STUDY: CPT | Performed by: SURGERY

## 2019-12-18 NOTE — TELEPHONE ENCOUNTER
Patient calling today that he feels like there is a pebble under his knee cap, he is having some bruising to the knee cap that surfaced  More swelling in the calf area today  He is wondering if okay to take ibuprofen for pain and swelling  I advised in the mean time to ice 20 min on 20 min off, elevate, and tylenol 1000mg TID for pain  He is stating that we are working on getting him in for MRI today  Please advise

## 2019-12-18 NOTE — TELEPHONE ENCOUNTER
Patient called in  # [phone] [id number]    Patient said he woke up today with pain in his right knee cap  He is worried something else is going on  Please advise

## 2019-12-20 ENCOUNTER — TELEPHONE (OUTPATIENT)
Dept: OBGYN CLINIC | Facility: HOSPITAL | Age: 39
End: 2019-12-20

## 2019-12-20 ENCOUNTER — HOSPITAL ENCOUNTER (OUTPATIENT)
Dept: RADIOLOGY | Facility: IMAGING CENTER | Age: 39
Discharge: HOME | End: 2019-12-20
Payer: COMMERCIAL

## 2019-12-20 DIAGNOSIS — M25.861 CYST OF KNEE JOINT, RIGHT: ICD-10-CM

## 2019-12-20 DIAGNOSIS — M79.661 RIGHT CALF PAIN: ICD-10-CM

## 2019-12-20 PROCEDURE — 73721 MRI JNT OF LWR EXTRE W/O DYE: CPT

## 2019-12-20 NOTE — TELEPHONE ENCOUNTER
Patient sees Dr Ebony King  He wanted the doctor to know that he had his MRI, but now his right hamstring is hurting but he thinks it could be from the way he is walking on that leg  He just wanted to notify Dr Ebony King to discuss at his follow up on 12/23         CB: [phone]

## 2019-12-23 ENCOUNTER — TELEPHONE (OUTPATIENT)
Dept: OBGYN CLINIC | Facility: CLINIC | Age: 39
End: 2019-12-23

## 2019-12-23 NOTE — TELEPHONE ENCOUNTER
I spoke to the patient related the findings of the MRI  There is no meniscal or ligament tear  He does have some arthritis on the inside of his knee and ruptured Baker cyst which is what is causing the fluid in his calf  Both of these are treated conservatively and I told to keep his appointment for next week and I will try cortisone injection is needed treat both the arthritis and to help decrease the swelling from the cyst    He was grateful he does not need surgical intervention at this time

## 2019-12-23 NOTE — TELEPHONE ENCOUNTER
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

## 2019-12-31 ENCOUNTER — OFFICE VISIT (OUTPATIENT)
Dept: OBGYN CLINIC | Facility: CLINIC | Age: 39
End: 2019-12-31
Payer: COMMERCIAL

## 2019-12-31 VITALS — WEIGHT: 219 LBS | HEIGHT: 66 IN | BODY MASS INDEX: 35.2 KG/M2

## 2019-12-31 DIAGNOSIS — M66.0 RUPTURED BAKERS CYST: ICD-10-CM

## 2019-12-31 DIAGNOSIS — M17.11 PRIMARY OSTEOARTHRITIS OF RIGHT KNEE: Primary | ICD-10-CM

## 2019-12-31 PROCEDURE — 20610 DRAIN/INJ JOINT/BURSA W/O US: CPT | Performed by: ORTHOPAEDIC SURGERY

## 2019-12-31 PROCEDURE — 99213 OFFICE O/P EST LOW 20 MIN: CPT | Performed by: ORTHOPAEDIC SURGERY

## 2019-12-31 RX ORDER — METHYLPREDNISOLONE ACETATE 40 MG/ML
1 INJECTION, SUSPENSION INTRA-ARTICULAR; INTRALESIONAL; INTRAMUSCULAR; SOFT TISSUE
Status: COMPLETED | OUTPATIENT
Start: 2019-12-31 | End: 2019-12-31

## 2019-12-31 RX ORDER — LIDOCAINE HYDROCHLORIDE 10 MG/ML
4 INJECTION, SOLUTION INFILTRATION; PERINEURAL
Status: COMPLETED | OUTPATIENT
Start: 2019-12-31 | End: 2019-12-31

## 2019-12-31 RX ADMIN — LIDOCAINE HYDROCHLORIDE 4 ML: 10 INJECTION, SOLUTION INFILTRATION; PERINEURAL at 10:59

## 2019-12-31 RX ADMIN — METHYLPREDNISOLONE ACETATE 1 ML: 40 INJECTION, SUSPENSION INTRA-ARTICULAR; INTRALESIONAL; INTRAMUSCULAR; SOFT TISSUE at 10:59

## 2019-12-31 NOTE — PATIENT INSTRUCTIONS
Plan :  I am very happy the patient is doing better  I went over the MRI again with him as I had previously done over the phone and showed him the findings on a model  He does not have any surgically amenable lesion and he should get better with time  He does have some mild and early degenerative changes over his medial femur but hopefully we can quiet this with a conservative treatment regimen  I did tell him that over activities, weather changes, or recurrent trauma can flare this up again - he should try his best to avoid those activities that tend to cause him pain as I note below  I did inject his right knee today with lidocaine and Depo-Medrol through an inferolateral approach without problem and this should help decrease both pain and inflammation  I showed him a home exercise program to work on his quads and hamstrings without irritating the knee joint itself  He must back off on some of his workouts at the gym especially those lower body workouts that involve bending of his knee  I will see him back again here in 2 months for clinical re-evaluation and routine follow-up    1  I explained the natural history of the  problem to the  patient -you have mild or early degenerative changes on her medial femoral condyle of the knee and a  ruptured Baker's cyst which will resolve on its own  There is no surgical intervention necessary now and should improve with nonoperative treatment  2  Activity modification - avoid hyperflexion like bending, kneeling, squatting,  stairs as  much as humanly possible  Change position frequently to straighten your leg if you are sitting for a long period of time  3  Pain should be the warning guide to your activities - both during and after exercises  Stop doing your sport or activity if you are getting significant pain  4  Ice in large trash bag or bag of frozen peas for 15 min 4x a day, if needed, for pain or swelling  Heat is not indicated    5  Take Advil, Aleve, or Tylenol on an as-needed basis for pain  6  Do the isometric quad and hamstring exercises that you were shown diligently at  home  7  Gliding activities (skiing machine, elliptical, swimming) are allowed, but do not do pounding activities  ( treadmill, aerobics, distance walking )     8  A knee sleeve with patellar cut out can occasionally help in controlling some of the pain

## 2019-12-31 NOTE — PROGRESS NOTES
CC:  Right knee and calf pain        Assessment:  Mild DJD MFC right knee                           Ruptured Baker's cyst    Plan :  I am very happy the patient is doing better  I went over the MRI again with him as I had previously done over the phone and showed him the findings on a model  He does not have any surgically amenable lesion and he should get better with time  He does have some mild and early degenerative changes over his medial femur but hopefully we can quiet this with a conservative treatment regimen  I did tell him that over activities, weather changes, or recurrent trauma can flare this up again - he should try his best to avoid those activities that tend to cause him pain as I note below  I did inject his right knee today with lidocaine and Depo-Medrol through an inferolateral approach without problem and this should help decrease both pain and inflammation  I showed him a home exercise program to work on his quads and hamstrings without irritating the knee joint itself  He must back off on some of his workouts at the gym especially those lower body workouts that involve bending of his knee  I will see him back again here in 2 months for clinical re-evaluation and routine follow-up    1  I explained the natural history of the  problem to the  patient -you have mild or early degenerative changes on her medial femoral condyle of the knee and a  ruptured Baker's cyst which will resolve on its own  There is no surgical intervention necessary now and should improve with nonoperative treatment  2  Activity modification - avoid hyperflexion like bending, kneeling, squatting,  stairs as  much as humanly possible  Change position frequently to straighten your leg if you are sitting for a long period of time  3  Pain should be the warning guide to your activities - both during and after exercises  Stop doing your sport or activity if you are getting significant pain    4  Ice in large trash bag or bag of frozen peas for 15 min 4x a day, if needed, for pain or swelling  Heat is not indicated  5  Take Advil, Aleve, or Tylenol on an as-needed basis for pain  6  Do the isometric quad and hamstring exercises that you were shown diligently at  home  7  Gliding activities (skiing machine, elliptical, swimming) are allowed, but do not do pounding activities  ( treadmill, aerobics, distance walking )  8  A knee sleeve with patellar cut out can occasionally help in controlling some of the pain      HPI:   This 40-year-old white male was sent by his family physician for consultation for chronic right knee and calf pain  He has  a long history of knee problems  About 15 years ago he underwent anterior cruciate ligament repair of his right knee after an injury  He did relatively well after that until about 2 years ago when he was playing basketball when he landed awkwardly and twisted that same knee and had recurrent pain and swelling  That got better with conservative treatment  Then about 6 months ago while doing kickboxing he felt a twinge in his knee and that lasted for several weeks but resolved on its own  Since that time he has had intermittent pain in various areas of his knee  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 and has difficulty with stairs, kneeling, and squatting  He has no locking or true giving way  About a week ago the pain started up again and then just a few days ago he noticed a lump on the inside of his right knee  This is quite painful and he is limping, although he continues to work  He has documented right calf strain in 2018 and also vague right knee pain after running in the same year  He thinks that he has a meniscal injury  His family doctor tried to send him for an MRI twice but was denied twice due to lack of pre testing physical therapy and conservative treatment    He states he could not afford the co-pay for physical therapy and never got an MRI  He is quite active outside of work also  He returns now on 12/31/2019  He did get the MRI of his right knee which showed no internal derangement his knee  He did have a ruptured Baker cyst which could be the probable cause of his calf  fluid and some degenerative changes over the medial femoral condyle  No meniscal tear was seen or injury to the previous ACL graft  He is doing somewhat better in his calf swelling is decreased his pain is also diminished  He is still quite concerned that something else quite might be wrong "    The remainder of this patient's past medical history, family history, social history, medicines, and allergies was reviewed in the chart  Please see HPI for pertinent review of systems  All other systems reviewed are negative  He has a history of epilepsy, cervical radiculopathy, skin cancer, and multiple orthopedic issues    PE:   Ht 5' 5 5" (1 664 m)   Wt 99 3 kg (219 lb)   BMI 35 89 kg/m²   On exam today he was not limping or using any external aids  The swelling of his right knee is gone and the calf swelling has markedly diminished on the right  There are no overlying skin changes of redness or ecchymosis and no palpable cords  His calf was soft  His knee was stable to anterior posterior and medial lateral stress testing comparing right to left  He was not specifically tender over the mediolateral joint lines but did have some tenderness around the medial pole of the right patella and medial femur  Range of motion is improved close to normal going from 0-130 degrees on the right compared to the left  He showed good motion, capillary refill, sensation in the toes of the right foot    Studies reviewed: Nonweightbearing right knee x-rays done in October, 2019 were personally reviewed as well as the radiologist's report  There are 2 screws in place from his previous ACL reconstruction    These nonweightbearing views did not show any joint space collapse, spur formation, loose bodies, lytic areas, or soft tissue calcification  I personally reviewed an of his right knee and there is no illness of meniscal or ligament tear  The ACL graft has not torn and there is no meniscal injury  He does have some degenerative changes over the medial femoral condyle with fissuring and mild degeneration  There is a ruptured Baker's cyst consistent with the swelling that he has in his knee    Addendum:  I did send the patient for emergent duplex Doppler ultrasound and the technician called back after the study and stated that there is no evidence of clot in this area    The large cyst is seen and is not just pure fluid but in fact is a mixture of fluid and thicker material   This will be investigated further with an MRI     Large joint arthrocentesis: R knee  Date/Time: 12/31/2019 10:59 AM  Consent given by: patient  Site marked: site marked  Supporting Documentation  Indications: pain   Procedure Details  Location: knee - R knee  Preparation: Patient was prepped and draped in the usual sterile fashion  Needle size: 22 G  Ultrasound guidance: no  Approach: Inferior lateral   Medications administered: 4 mL lidocaine 1 %; 1 mL methylPREDNISolone acetate 40 mg/mL    Patient tolerance: patient tolerated the procedure well with no immediate complications  Dressing:  Sterile dressing applied

## 2020-01-15 ENCOUNTER — TELEPHONE (OUTPATIENT)
Dept: FAMILY MEDICINE CLINIC | Facility: CLINIC | Age: 40
End: 2020-01-15

## 2020-01-22 ENCOUNTER — OFFICE VISIT (OUTPATIENT)
Dept: FAMILY MEDICINE CLINIC | Facility: CLINIC | Age: 40
End: 2020-01-22
Payer: COMMERCIAL

## 2020-01-22 VITALS
BODY MASS INDEX: 34.55 KG/M2 | HEIGHT: 66 IN | WEIGHT: 215 LBS | SYSTOLIC BLOOD PRESSURE: 122 MMHG | DIASTOLIC BLOOD PRESSURE: 68 MMHG

## 2020-01-22 DIAGNOSIS — M25.561 CHRONIC PAIN OF RIGHT KNEE: Primary | ICD-10-CM

## 2020-01-22 DIAGNOSIS — E66.9 OBESITY (BMI 30-39.9): ICD-10-CM

## 2020-01-22 DIAGNOSIS — G89.29 CHRONIC PAIN OF RIGHT KNEE: Primary | ICD-10-CM

## 2020-01-22 PROCEDURE — 99213 OFFICE O/P EST LOW 20 MIN: CPT | Performed by: FAMILY MEDICINE

## 2020-01-22 PROCEDURE — 3008F BODY MASS INDEX DOCD: CPT | Performed by: FAMILY MEDICINE

## 2020-01-22 PROCEDURE — 1036F TOBACCO NON-USER: CPT | Performed by: FAMILY MEDICINE

## 2020-01-22 RX ORDER — PHENTERMINE HYDROCHLORIDE 37.5 MG/1
37.5 CAPSULE ORAL EVERY MORNING
Qty: 30 CAPSULE | Refills: 2 | Status: SHIPPED | OUTPATIENT
Start: 2020-01-22 | End: 2020-06-15

## 2020-01-22 RX ORDER — TOPIRAMATE 25 MG/1
25 TABLET ORAL 2 TIMES DAILY
Qty: 60 TABLET | Refills: 2 | Status: SHIPPED | OUTPATIENT
Start: 2020-01-22 | End: 2020-03-18

## 2020-01-22 NOTE — PROGRESS NOTES
Assessment/Plan:       Diagnoses and all orders for this visit:    Chronic pain of right knee  -     Ambulatory referral to Orthopedic Surgery; Future    Obesity (BMI 30-39 9)  -     phentermine 37 5 MG capsule; Take 1 capsule (37 5 mg total) by mouth every morning  -     topiramate (TOPAMAX) 25 mg tablet; Take 1 tablet (25 mg total) by mouth 2 (two) times a day            Subjective:        Patient ID: Padilla Shepard is a 37 y o  male  Patient is here to follow-up on right knee pain  Patient wishes medication for weight loss  Patient did see orthopedics and had injection  The patient had  Negative Doppler study  Patient did go for MRI  The following portions of the patient's history were reviewed and updated as appropriate: allergies, current medications, past family history, past medical history, past social history, past surgical history and problem list       Review of Systems   Constitutional: Negative  HENT: Negative  Eyes: Negative  Respiratory: Negative  Cardiovascular: Negative  Gastrointestinal: Negative  Endocrine: Negative  Genitourinary: Negative  Musculoskeletal: Positive for arthralgias and gait problem  Skin: Negative  Allergic/Immunologic: Negative  Hematological: Negative  Psychiatric/Behavioral: Negative  Objective:      BMI Counseling: Body mass index is 35 23 kg/m²  The BMI is above normal  Nutrition recommendations include decreasing portion sizes  Exercise recommendations include moderate physical activity 150 minutes/week  /68 (BP Location: Right arm)   Ht 5' 5 5" (1 664 m)   Wt 97 5 kg (215 lb)   BMI 35 23 kg/m²          Physical Exam   Constitutional: He appears well-developed and well-nourished  No distress  HENT:   Head: Normocephalic  Right Ear: External ear normal    Left Ear: External ear normal    Mouth/Throat: Oropharynx is clear and moist  No oropharyngeal exudate     Eyes: Pupils are equal, round, and reactive to light  EOM are normal  Right eye exhibits no discharge  Left eye exhibits no discharge  No scleral icterus  Neck: Normal range of motion  Neck supple  No thyromegaly present  Cardiovascular: Normal rate, regular rhythm, normal heart sounds and intact distal pulses  Exam reveals no gallop and no friction rub  No murmur heard  Pulmonary/Chest: Effort normal and breath sounds normal  No respiratory distress  He has no wheezes  He has no rales  He exhibits no tenderness  Musculoskeletal: Normal range of motion  He exhibits tenderness  He exhibits no edema  Lymphadenopathy:     He has no cervical adenopathy  Neurological: No cranial nerve deficit  He exhibits normal muscle tone  Coordination normal    Skin: Skin is warm and dry  No rash noted  He is not diaphoretic  No erythema  No pallor  Psychiatric: He has a normal mood and affect  His behavior is normal  Judgment and thought content normal    Nursing note and vitals reviewed

## 2020-01-23 ENCOUNTER — TELEPHONE (OUTPATIENT)
Dept: FAMILY MEDICINE CLINIC | Facility: CLINIC | Age: 40
End: 2020-01-23

## 2020-01-23 DIAGNOSIS — G89.29 CHRONIC PAIN OF RIGHT KNEE: Primary | ICD-10-CM

## 2020-01-23 DIAGNOSIS — M25.561 CHRONIC PAIN OF RIGHT KNEE: Primary | ICD-10-CM

## 2020-03-18 DIAGNOSIS — E66.9 OBESITY (BMI 30-39.9): ICD-10-CM

## 2020-03-18 RX ORDER — TOPIRAMATE 25 MG/1
TABLET ORAL
Qty: 180 TABLET | Refills: 1 | Status: SHIPPED | OUTPATIENT
Start: 2020-03-18 | End: 2021-04-29 | Stop reason: SDUPTHER

## 2020-06-13 DIAGNOSIS — E66.9 OBESITY (BMI 30-39.9): ICD-10-CM

## 2020-06-15 RX ORDER — PHENTERMINE HYDROCHLORIDE 37.5 MG/1
CAPSULE ORAL
Qty: 30 CAPSULE | Refills: 2 | Status: SHIPPED | OUTPATIENT
Start: 2020-06-15 | End: 2020-10-14 | Stop reason: SDUPTHER

## 2020-07-27 ENCOUNTER — OFFICE VISIT (OUTPATIENT)
Dept: FAMILY MEDICINE CLINIC | Facility: CLINIC | Age: 40
End: 2020-07-27
Payer: COMMERCIAL

## 2020-07-27 VITALS
TEMPERATURE: 96 F | DIASTOLIC BLOOD PRESSURE: 80 MMHG | HEIGHT: 66 IN | SYSTOLIC BLOOD PRESSURE: 128 MMHG | BODY MASS INDEX: 32.62 KG/M2 | HEART RATE: 74 BPM | WEIGHT: 203 LBS | OXYGEN SATURATION: 96 %

## 2020-07-27 DIAGNOSIS — L81.9 HYPOPIGMENTATION: Primary | ICD-10-CM

## 2020-07-27 PROCEDURE — 1036F TOBACCO NON-USER: CPT | Performed by: FAMILY MEDICINE

## 2020-07-27 PROCEDURE — 99213 OFFICE O/P EST LOW 20 MIN: CPT | Performed by: FAMILY MEDICINE

## 2020-07-27 PROCEDURE — 3008F BODY MASS INDEX DOCD: CPT | Performed by: FAMILY MEDICINE

## 2020-07-27 NOTE — PROGRESS NOTES
Assessment/Plan: patient will avoid aerosolized sunscreen and use lotion as directed  Guidance given overall  Diagnoses and all orders for this visit:    Hypopigmentation            Subjective:        Patient ID: Joseph Hdz is a 41 y o  male  Patient is here status post severe sunburn  In June  Patient with ongoing patchy rash on back  No other new topical products being use  The following portions of the patient's history were reviewed and updated as appropriate: allergies, current medications, past family history, past medical history, past social history, past surgical history and problem list       Review of Systems   Constitutional: Negative  HENT: Negative  Eyes: Negative  Respiratory: Negative  Cardiovascular: Negative  Gastrointestinal: Negative  Endocrine: Negative  Genitourinary: Negative  Musculoskeletal: Negative  Skin: Positive for rash  Allergic/Immunologic: Negative  Neurological: Negative  Hematological: Negative  Psychiatric/Behavioral: Negative  Objective:               /80 (BP Location: Left arm, Patient Position: Sitting, Cuff Size: Large)   Pulse 74   Temp (!) 96 °F (35 6 °C) (Tympanic)   Ht 5' 5 5" (1 664 m)   Wt 92 1 kg (203 lb)   SpO2 96%   BMI 33 27 kg/m²          Physical Exam   Constitutional: He appears well-developed and well-nourished  Cardiovascular: Normal rate, regular rhythm and normal heart sounds  Pulmonary/Chest: Effort normal and breath sounds normal    Skin: Rash noted  Hypopigmented areas on back  Nursing note and vitals reviewed

## 2020-10-14 ENCOUNTER — OFFICE VISIT (OUTPATIENT)
Dept: PAIN MEDICINE | Facility: CLINIC | Age: 40
End: 2020-10-14
Payer: COMMERCIAL

## 2020-10-14 ENCOUNTER — OFFICE VISIT (OUTPATIENT)
Dept: FAMILY MEDICINE CLINIC | Facility: CLINIC | Age: 40
End: 2020-10-14
Payer: COMMERCIAL

## 2020-10-14 VITALS
WEIGHT: 198 LBS | DIASTOLIC BLOOD PRESSURE: 85 MMHG | HEART RATE: 66 BPM | TEMPERATURE: 98.2 F | BODY MASS INDEX: 31.82 KG/M2 | HEIGHT: 66 IN | SYSTOLIC BLOOD PRESSURE: 131 MMHG

## 2020-10-14 VITALS
DIASTOLIC BLOOD PRESSURE: 78 MMHG | OXYGEN SATURATION: 98 % | HEIGHT: 66 IN | WEIGHT: 197 LBS | HEART RATE: 77 BPM | BODY MASS INDEX: 31.66 KG/M2 | SYSTOLIC BLOOD PRESSURE: 134 MMHG

## 2020-10-14 DIAGNOSIS — E66.9 OBESITY (BMI 30-39.9): ICD-10-CM

## 2020-10-14 DIAGNOSIS — M54.12 CERVICAL RADICULOPATHY: Primary | ICD-10-CM

## 2020-10-14 DIAGNOSIS — M48.02 CERVICAL SPINAL STENOSIS: ICD-10-CM

## 2020-10-14 DIAGNOSIS — M50.20 CERVICAL HERNIATED DISC: ICD-10-CM

## 2020-10-14 DIAGNOSIS — M62.81 MUSCLE WEAKNESS OF RIGHT UPPER EXTREMITY: Primary | ICD-10-CM

## 2020-10-14 DIAGNOSIS — L81.9 HYPERPIGMENTATION: ICD-10-CM

## 2020-10-14 DIAGNOSIS — M75.52 SUBACROMIAL BURSITIS OF LEFT SHOULDER JOINT: ICD-10-CM

## 2020-10-14 DIAGNOSIS — G89.29 CHRONIC LEFT SHOULDER PAIN: ICD-10-CM

## 2020-10-14 DIAGNOSIS — M25.512 CHRONIC LEFT SHOULDER PAIN: ICD-10-CM

## 2020-10-14 DIAGNOSIS — M79.18 MYOFASCIAL PAIN: ICD-10-CM

## 2020-10-14 PROCEDURE — 1036F TOBACCO NON-USER: CPT | Performed by: NURSE PRACTITIONER

## 2020-10-14 PROCEDURE — 99214 OFFICE O/P EST MOD 30 MIN: CPT | Performed by: FAMILY MEDICINE

## 2020-10-14 PROCEDURE — 3725F SCREEN DEPRESSION PERFORMED: CPT | Performed by: FAMILY MEDICINE

## 2020-10-14 PROCEDURE — 99214 OFFICE O/P EST MOD 30 MIN: CPT | Performed by: NURSE PRACTITIONER

## 2020-10-14 RX ORDER — PHENTERMINE HYDROCHLORIDE 37.5 MG/1
37.5 CAPSULE ORAL EVERY MORNING
Qty: 30 CAPSULE | Refills: 2 | Status: SHIPPED | OUTPATIENT
Start: 2020-10-14 | End: 2020-10-16

## 2020-10-16 DIAGNOSIS — E66.9 OBESITY (BMI 30-39.9): ICD-10-CM

## 2020-10-16 RX ORDER — PHENTERMINE HYDROCHLORIDE 37.5 MG/1
CAPSULE ORAL
Qty: 30 CAPSULE | Refills: 2 | Status: SHIPPED | OUTPATIENT
Start: 2020-10-16 | End: 2021-04-29 | Stop reason: SDUPTHER

## 2020-10-23 ENCOUNTER — TELEPHONE (OUTPATIENT)
Dept: FAMILY MEDICINE CLINIC | Facility: CLINIC | Age: 40
End: 2020-10-23

## 2020-10-23 DIAGNOSIS — J45.990 BRONCHOSPASM, EXERCISE-INDUCED: ICD-10-CM

## 2020-10-23 DIAGNOSIS — E66.9 OBESITY (BMI 30-39.9): Primary | ICD-10-CM

## 2020-10-26 RX ORDER — ALBUTEROL SULFATE 90 UG/1
AEROSOL, METERED RESPIRATORY (INHALATION)
Qty: 1 INHALER | Refills: 1 | Status: SHIPPED | OUTPATIENT
Start: 2020-10-26 | End: 2021-08-30

## 2020-10-27 ENCOUNTER — HOSPITAL ENCOUNTER (OUTPATIENT)
Dept: RADIOLOGY | Facility: CLINIC | Age: 40
Discharge: HOME | End: 2020-10-27
Attending: ANESTHESIOLOGY | Admitting: ANESTHESIOLOGY
Payer: COMMERCIAL

## 2020-10-27 VITALS
HEART RATE: 84 BPM | OXYGEN SATURATION: 98 % | SYSTOLIC BLOOD PRESSURE: 141 MMHG | RESPIRATION RATE: 18 BRPM | TEMPERATURE: 97.8 F | DIASTOLIC BLOOD PRESSURE: 69 MMHG

## 2020-10-27 DIAGNOSIS — M54.12 CERVICAL RADICULOPATHY: ICD-10-CM

## 2020-10-27 PROCEDURE — 62321 NJX INTERLAMINAR CRV/THRC: CPT | Performed by: ANESTHESIOLOGY

## 2020-10-27 RX ORDER — LIDOCAINE HYDROCHLORIDE 10 MG/ML
5 INJECTION, SOLUTION EPIDURAL; INFILTRATION; INTRACAUDAL; PERINEURAL ONCE
Status: COMPLETED | OUTPATIENT
Start: 2020-10-27 | End: 2020-10-27

## 2020-10-27 RX ORDER — PAPAVERINE HCL 150 MG
10 CAPSULE, EXTENDED RELEASE ORAL ONCE
Status: COMPLETED | OUTPATIENT
Start: 2020-10-27 | End: 2020-10-27

## 2020-10-27 RX ORDER — KETOCONAZOLE 20 MG/ML
SHAMPOO TOPICAL
COMMUNITY
Start: 2020-10-15 | End: 2021-06-28

## 2020-10-27 RX ADMIN — IOHEXOL 1 ML: 300 INJECTION, SOLUTION INTRAVENOUS at 14:08

## 2020-10-27 RX ADMIN — DEXAMETHASONE SODIUM PHOSPHATE 10 MG: 10 INJECTION, SOLUTION INTRAMUSCULAR; INTRAVENOUS at 14:08

## 2020-10-27 RX ADMIN — LIDOCAINE HYDROCHLORIDE 2 ML: 10 INJECTION, SOLUTION EPIDURAL; INFILTRATION; INTRACAUDAL; PERINEURAL at 14:06

## 2020-10-28 ENCOUNTER — LAB (OUTPATIENT)
Dept: LAB | Facility: HOSPITAL | Age: 40
End: 2020-10-28
Payer: COMMERCIAL

## 2020-10-28 DIAGNOSIS — E66.9 OBESITY (BMI 30-39.9): ICD-10-CM

## 2020-10-28 LAB
ALBUMIN SERPL BCP-MCNC: 3.8 G/DL (ref 3.5–5)
ALP SERPL-CCNC: 82 U/L (ref 46–116)
ALT SERPL W P-5'-P-CCNC: 39 U/L (ref 12–78)
ANION GAP SERPL CALCULATED.3IONS-SCNC: 7 MMOL/L (ref 4–13)
AST SERPL W P-5'-P-CCNC: 20 U/L (ref 5–45)
BASOPHILS # BLD AUTO: 0.01 THOUSANDS/ΜL (ref 0–0.1)
BASOPHILS NFR BLD AUTO: 0 % (ref 0–1)
BILIRUB SERPL-MCNC: 0.51 MG/DL (ref 0.2–1)
BUN SERPL-MCNC: 23 MG/DL (ref 5–25)
CALCIUM SERPL-MCNC: 9.6 MG/DL (ref 8.3–10.1)
CHLORIDE SERPL-SCNC: 103 MMOL/L (ref 100–108)
CHOLEST SERPL-MCNC: 239 MG/DL (ref 50–200)
CO2 SERPL-SCNC: 28 MMOL/L (ref 21–32)
CREAT SERPL-MCNC: 1.18 MG/DL (ref 0.6–1.3)
EOSINOPHIL # BLD AUTO: 0 THOUSAND/ΜL (ref 0–0.61)
EOSINOPHIL NFR BLD AUTO: 0 % (ref 0–6)
ERYTHROCYTE [DISTWIDTH] IN BLOOD BY AUTOMATED COUNT: 12 % (ref 11.6–15.1)
GFR SERPL CREATININE-BSD FRML MDRD: 76 ML/MIN/1.73SQ M
GLUCOSE P FAST SERPL-MCNC: 116 MG/DL (ref 65–99)
HCT VFR BLD AUTO: 49.1 % (ref 36.5–49.3)
HDLC SERPL-MCNC: 70 MG/DL
HGB BLD-MCNC: 16.5 G/DL (ref 12–17)
IMM GRANULOCYTES # BLD AUTO: 0.07 THOUSAND/UL (ref 0–0.2)
IMM GRANULOCYTES NFR BLD AUTO: 1 % (ref 0–2)
LDLC SERPL CALC-MCNC: 156 MG/DL (ref 0–100)
LYMPHOCYTES # BLD AUTO: 1.31 THOUSANDS/ΜL (ref 0.6–4.47)
LYMPHOCYTES NFR BLD AUTO: 9 % (ref 14–44)
MCH RBC QN AUTO: 32.2 PG (ref 26.8–34.3)
MCHC RBC AUTO-ENTMCNC: 33.6 G/DL (ref 31.4–37.4)
MCV RBC AUTO: 96 FL (ref 82–98)
MONOCYTES # BLD AUTO: 0.69 THOUSAND/ΜL (ref 0.17–1.22)
MONOCYTES NFR BLD AUTO: 5 % (ref 4–12)
NEUTROPHILS # BLD AUTO: 12.27 THOUSANDS/ΜL (ref 1.85–7.62)
NEUTS SEG NFR BLD AUTO: 85 % (ref 43–75)
NONHDLC SERPL-MCNC: 169 MG/DL
NRBC BLD AUTO-RTO: 0 /100 WBCS
PLATELET # BLD AUTO: 225 THOUSANDS/UL (ref 149–390)
PMV BLD AUTO: 11.6 FL (ref 8.9–12.7)
POTASSIUM SERPL-SCNC: 4.5 MMOL/L (ref 3.5–5.3)
PROT SERPL-MCNC: 7.5 G/DL (ref 6.4–8.2)
RBC # BLD AUTO: 5.13 MILLION/UL (ref 3.88–5.62)
SODIUM SERPL-SCNC: 138 MMOL/L (ref 136–145)
T4 FREE SERPL-MCNC: 0.95 NG/DL (ref 0.76–1.46)
TRIGL SERPL-MCNC: 63 MG/DL
TSH SERPL DL<=0.05 MIU/L-ACNC: 0.35 UIU/ML (ref 0.36–3.74)
WBC # BLD AUTO: 14.35 THOUSAND/UL (ref 4.31–10.16)

## 2020-10-28 PROCEDURE — 84439 ASSAY OF FREE THYROXINE: CPT

## 2020-10-28 PROCEDURE — 36415 COLL VENOUS BLD VENIPUNCTURE: CPT

## 2020-10-28 PROCEDURE — 84443 ASSAY THYROID STIM HORMONE: CPT

## 2020-10-28 PROCEDURE — 85025 COMPLETE CBC W/AUTO DIFF WBC: CPT

## 2020-10-28 PROCEDURE — 80061 LIPID PANEL: CPT

## 2020-10-28 PROCEDURE — 80053 COMPREHEN METABOLIC PANEL: CPT

## 2020-10-29 ENCOUNTER — TELEPHONE (OUTPATIENT)
Dept: FAMILY MEDICINE CLINIC | Facility: CLINIC | Age: 40
End: 2020-10-29

## 2020-10-29 DIAGNOSIS — L65.9 HAIR LOSS: ICD-10-CM

## 2020-10-29 DIAGNOSIS — R79.89 DECREASED THYROID STIMULATING HORMONE (TSH) LEVEL: ICD-10-CM

## 2020-10-29 DIAGNOSIS — D72.829 LEUKOCYTOSIS, UNSPECIFIED TYPE: Primary | ICD-10-CM

## 2020-10-29 DIAGNOSIS — E78.00 HIGH CHOLESTEROL: ICD-10-CM

## 2020-10-29 DIAGNOSIS — E66.9 OBESITY (BMI 30-39.9): ICD-10-CM

## 2020-10-29 DIAGNOSIS — R73.9 ELEVATED BLOOD SUGAR: ICD-10-CM

## 2020-10-29 RX ORDER — DOXYCYCLINE HYCLATE 100 MG/1
CAPSULE ORAL
Qty: 28 CAPSULE | Refills: 0 | Status: CANCELLED | OUTPATIENT
Start: 2020-10-29 | End: 2020-11-12

## 2020-10-29 NOTE — TELEPHONE ENCOUNTER
Dr Bull Cortes, patient has called back a few times about his labs  Pt states he had a cortisone inj in neck night before labs and has a skin fungus on back and is using special shampoo  He wants to know if this could be elevating his lab results

## 2020-10-29 NOTE — TELEPHONE ENCOUNTER
----- Message from Kimberly Clifton DO sent at 10/29/2020  8:38 AM EDT -----  Call patient  Labs reviewed  Glucose slightly elevated 116 TSH slightly low  White blood cell count elevated at 14 5  Patient with elevated cholesterol 239  Recommend starting doxycycline 100 mg twice daily with food Number 28 pills with no refills  Patient to decrease sugar intake and watch carbohydrates  Patient also to have low-fat diet    Recommend repeating CMP CBC TSH, free T4, thyroid antibodies in 1 month

## 2020-10-29 NOTE — TELEPHONE ENCOUNTER
I already address this in a another task  Once again, this steroid injection may elevated white blood cell count    Patient may observe at this time and recheck blood work like he is supposed to in 1 month as directed

## 2020-11-02 ENCOUNTER — OFFICE VISIT (OUTPATIENT)
Dept: FAMILY MEDICINE CLINIC | Facility: CLINIC | Age: 40
End: 2020-11-02
Payer: COMMERCIAL

## 2020-11-02 VITALS
DIASTOLIC BLOOD PRESSURE: 72 MMHG | TEMPERATURE: 97.9 F | BODY MASS INDEX: 30.52 KG/M2 | WEIGHT: 189.9 LBS | SYSTOLIC BLOOD PRESSURE: 120 MMHG | HEIGHT: 66 IN

## 2020-11-02 DIAGNOSIS — R05.9 COUGH: ICD-10-CM

## 2020-11-02 DIAGNOSIS — E78.00 PURE HYPERCHOLESTEROLEMIA: ICD-10-CM

## 2020-11-02 DIAGNOSIS — R73.9 HYPERGLYCEMIA: ICD-10-CM

## 2020-11-02 DIAGNOSIS — R79.89 LOW TSH LEVEL: Primary | ICD-10-CM

## 2020-11-02 DIAGNOSIS — J01.00 ACUTE NON-RECURRENT MAXILLARY SINUSITIS: ICD-10-CM

## 2020-11-02 DIAGNOSIS — F51.01 PRIMARY INSOMNIA: ICD-10-CM

## 2020-11-02 PROCEDURE — 3008F BODY MASS INDEX DOCD: CPT | Performed by: FAMILY MEDICINE

## 2020-11-02 PROCEDURE — 99214 OFFICE O/P EST MOD 30 MIN: CPT | Performed by: FAMILY MEDICINE

## 2020-11-02 PROCEDURE — 1036F TOBACCO NON-USER: CPT | Performed by: FAMILY MEDICINE

## 2020-11-02 RX ORDER — DOXYCYCLINE HYCLATE 100 MG
100 TABLET ORAL 2 TIMES DAILY
Qty: 20 TABLET | Refills: 0 | Status: SHIPPED | OUTPATIENT
Start: 2020-11-02 | End: 2020-11-12

## 2020-11-03 ENCOUNTER — TELEPHONE (OUTPATIENT)
Dept: PAIN MEDICINE | Facility: CLINIC | Age: 40
End: 2020-11-03

## 2020-11-06 ENCOUNTER — HOSPITAL ENCOUNTER (OUTPATIENT)
Dept: RADIOLOGY | Facility: HOSPITAL | Age: 40
Discharge: HOME | End: 2020-11-06
Payer: COMMERCIAL

## 2020-11-06 DIAGNOSIS — R05.9 COUGH: ICD-10-CM

## 2020-11-06 PROCEDURE — 71046 X-RAY EXAM CHEST 2 VIEWS: CPT

## 2020-12-09 ENCOUNTER — TELEPHONE (OUTPATIENT)
Dept: FAMILY MEDICINE CLINIC | Facility: CLINIC | Age: 40
End: 2020-12-09

## 2020-12-09 DIAGNOSIS — U07.1 COVID-19: ICD-10-CM

## 2020-12-09 DIAGNOSIS — U07.1 COVID-19: Primary | ICD-10-CM

## 2020-12-09 PROCEDURE — U0003 INFECTIOUS AGENT DETECTION BY NUCLEIC ACID (DNA OR RNA); SEVERE ACUTE RESPIRATORY SYNDROME CORONAVIRUS 2 (SARS-COV-2) (CORONAVIRUS DISEASE [COVID-19]), AMPLIFIED PROBE TECHNIQUE, MAKING USE OF HIGH THROUGHPUT TECHNOLOGIES AS DESCRIBED BY CMS-2020-01-R: HCPCS | Performed by: FAMILY MEDICINE

## 2020-12-10 LAB — SARS-COV-2 RNA SPEC QL NAA+PROBE: NOT DETECTED

## 2020-12-22 ENCOUNTER — LAB (OUTPATIENT)
Dept: LAB | Facility: HOSPITAL | Age: 40
End: 2020-12-22
Payer: COMMERCIAL

## 2020-12-22 DIAGNOSIS — J01.00 ACUTE NON-RECURRENT MAXILLARY SINUSITIS: ICD-10-CM

## 2020-12-22 DIAGNOSIS — F51.01 PRIMARY INSOMNIA: ICD-10-CM

## 2020-12-22 DIAGNOSIS — R79.89 LOW TSH LEVEL: ICD-10-CM

## 2020-12-22 DIAGNOSIS — E78.00 PURE HYPERCHOLESTEROLEMIA: ICD-10-CM

## 2020-12-22 DIAGNOSIS — R73.9 HYPERGLYCEMIA: ICD-10-CM

## 2020-12-22 LAB
ALBUMIN SERPL BCP-MCNC: 3.8 G/DL (ref 3.5–5)
ALP SERPL-CCNC: 78 U/L (ref 46–116)
ALT SERPL W P-5'-P-CCNC: 42 U/L (ref 12–78)
ANION GAP SERPL CALCULATED.3IONS-SCNC: 9 MMOL/L (ref 4–13)
AST SERPL W P-5'-P-CCNC: 26 U/L (ref 5–45)
BASOPHILS # BLD AUTO: 0.03 THOUSANDS/ΜL (ref 0–0.1)
BASOPHILS NFR BLD AUTO: 1 % (ref 0–1)
BILIRUB SERPL-MCNC: 0.6 MG/DL (ref 0.2–1)
BUN SERPL-MCNC: 19 MG/DL (ref 5–25)
CALCIUM SERPL-MCNC: 9.2 MG/DL (ref 8.3–10.1)
CHLORIDE SERPL-SCNC: 103 MMOL/L (ref 100–108)
CO2 SERPL-SCNC: 28 MMOL/L (ref 21–32)
CREAT SERPL-MCNC: 1.27 MG/DL (ref 0.6–1.3)
EOSINOPHIL # BLD AUTO: 0.39 THOUSAND/ΜL (ref 0–0.61)
EOSINOPHIL NFR BLD AUTO: 7 % (ref 0–6)
ERYTHROCYTE [DISTWIDTH] IN BLOOD BY AUTOMATED COUNT: 12.3 % (ref 11.6–15.1)
EST. AVERAGE GLUCOSE BLD GHB EST-MCNC: 97 MG/DL
GFR SERPL CREATININE-BSD FRML MDRD: 69 ML/MIN/1.73SQ M
GLUCOSE P FAST SERPL-MCNC: 112 MG/DL (ref 65–99)
HBA1C MFR BLD: 5 %
HCT VFR BLD AUTO: 46.3 % (ref 36.5–49.3)
HGB BLD-MCNC: 15.4 G/DL (ref 12–17)
IMM GRANULOCYTES # BLD AUTO: 0.07 THOUSAND/UL (ref 0–0.2)
IMM GRANULOCYTES NFR BLD AUTO: 1 % (ref 0–2)
LYMPHOCYTES # BLD AUTO: 1.7 THOUSANDS/ΜL (ref 0.6–4.47)
LYMPHOCYTES NFR BLD AUTO: 31 % (ref 14–44)
MCH RBC QN AUTO: 33.4 PG (ref 26.8–34.3)
MCHC RBC AUTO-ENTMCNC: 33.3 G/DL (ref 31.4–37.4)
MCV RBC AUTO: 100 FL (ref 82–98)
MONOCYTES # BLD AUTO: 0.47 THOUSAND/ΜL (ref 0.17–1.22)
MONOCYTES NFR BLD AUTO: 9 % (ref 4–12)
NEUTROPHILS # BLD AUTO: 2.84 THOUSANDS/ΜL (ref 1.85–7.62)
NEUTS SEG NFR BLD AUTO: 51 % (ref 43–75)
NRBC BLD AUTO-RTO: 0 /100 WBCS
PLATELET # BLD AUTO: 182 THOUSANDS/UL (ref 149–390)
PMV BLD AUTO: 11.8 FL (ref 8.9–12.7)
POTASSIUM SERPL-SCNC: 4.4 MMOL/L (ref 3.5–5.3)
PROT SERPL-MCNC: 7.3 G/DL (ref 6.4–8.2)
RBC # BLD AUTO: 4.61 MILLION/UL (ref 3.88–5.62)
SODIUM SERPL-SCNC: 140 MMOL/L (ref 136–145)
TSH SERPL DL<=0.05 MIU/L-ACNC: 1.15 UIU/ML (ref 0.36–3.74)
WBC # BLD AUTO: 5.5 THOUSAND/UL (ref 4.31–10.16)

## 2020-12-22 PROCEDURE — 84443 ASSAY THYROID STIM HORMONE: CPT

## 2020-12-22 PROCEDURE — 84403 ASSAY OF TOTAL TESTOSTERONE: CPT

## 2020-12-22 PROCEDURE — 80053 COMPREHEN METABOLIC PANEL: CPT

## 2020-12-22 PROCEDURE — 85025 COMPLETE CBC W/AUTO DIFF WBC: CPT

## 2020-12-22 PROCEDURE — 84402 ASSAY OF FREE TESTOSTERONE: CPT

## 2020-12-22 PROCEDURE — 36415 COLL VENOUS BLD VENIPUNCTURE: CPT

## 2020-12-22 PROCEDURE — 83036 HEMOGLOBIN GLYCOSYLATED A1C: CPT

## 2020-12-23 ENCOUNTER — TELEPHONE (OUTPATIENT)
Dept: FAMILY MEDICINE CLINIC | Facility: CLINIC | Age: 40
End: 2020-12-23

## 2020-12-24 LAB
TESTOST FREE SERPL-MCNC: 15.7 PG/ML (ref 6.8–21.5)
TESTOST SERPL-MCNC: 533 NG/DL (ref 264–916)

## 2021-02-19 ENCOUNTER — TELEPHONE (OUTPATIENT)
Dept: FAMILY MEDICINE CLINIC | Facility: CLINIC | Age: 41
End: 2021-02-19

## 2021-02-19 ENCOUNTER — OFFICE VISIT (OUTPATIENT)
Dept: FAMILY MEDICINE CLINIC | Facility: CLINIC | Age: 41
End: 2021-02-19
Payer: COMMERCIAL

## 2021-02-19 DIAGNOSIS — Z20.822 EXPOSURE TO COVID-19 VIRUS: Primary | ICD-10-CM

## 2021-02-19 PROCEDURE — 99213 OFFICE O/P EST LOW 20 MIN: CPT | Performed by: FAMILY MEDICINE

## 2021-02-19 PROCEDURE — 1036F TOBACCO NON-USER: CPT | Performed by: FAMILY MEDICINE

## 2021-02-19 NOTE — TELEPHONE ENCOUNTER
Patient was exposed to covid at work, no symptoms, has questions about quarantine so he was scheduled for an appointment

## 2021-02-19 NOTE — PROGRESS NOTES
COVID-19 Virtual Visit     Assessment/Plan:    Problem List Items Addressed This Visit     None      Visit Diagnoses     Exposure to COVID-19 virus    -  Primary    Relevant Orders    Novel Coronavirus (Covid-19),PCR [facility] - Collected at   [facility] or [facility]         Disposition:     I recommended COVID-19 PCR testing on or after day 5 since last exposure and if negative can end quarantine after 7 days  Patient was instructed to watch for symptoms until 14 days after exposure  If patient were to develop symptoms, they should immediately self isolate and call our office for further guidance  I have spent 23 minutes directly with the patient  Greater than 50% of this time was spent in counseling/coordination of care regarding: instructions for management and patient and family education  Encounter provider Jarad Chavez DO    Provider located at [address] [place] [id number]    Recent Visits  No visits were found meeting these conditions  Showing recent visits within past 7 days and meeting all other requirements     Today's Visits  Date Type Provider Dept   02/19/21 Office Visit Chalo Aj DO [facility] [address] [facility]   02/19/21 Telephone [facility], [address] [place] [address] [place] today's visits and meeting all other requirements     Future Appointments  No visits were found meeting these conditions  Showing future appointments within next 150 days and meeting all other requirements      This virtual check-in was done via [employer] and patient was informed that this is a secure, HIPAA-compliant platform  He agrees to proceed  Patient agrees to participate in a virtual check in via telephone or video visit instead of presenting to the office to address urgent/immediate medical needs  Patient is aware this is a billable service      After connecting through [facility], the patient was identified by name and date of birth  Xi Smith was informed that this was a telemedicine visit and that the exam was being conducted confidentially over secure lines  My office door was closed  No one else was in the room  Xi Smith acknowledged consent and understanding of privacy and security of the telemedicine visit  I informed the patient that I have reviewed his record in Epic and presented the opportunity for him to ask any questions regarding the visit today  The patient agreed to participate  Subjective:   Xi Smith is a 35 y o  male who is concerned about COVID-19  Patient is currently asymptomatic  Patient denies fever, chills, fatigue, malaise, congestion, rhinorrhea, sore throat, anosmia, loss of taste, cough, shortness of breath, chest tightness, abdominal pain, nausea, vomiting, diarrhea, myalgias and headaches       Date of symptom onset: 2/16/2021    Exposure:   Contact with a person who is under investigation (PUI) for or who is positive for COVID-19 within the last 14 days?: Yes    Hospitalized recently for fever and/or lower respiratory symptoms?: No      Currently a healthcare worker that is involved in direct patient care?: No      Works in a special setting where the risk of COVID-19 transmission may be high? (this may include long-term care, correctional and retirement facilities; homeless shelters; assisted-living facilities and group homes ): No      Resident in a special setting where the risk of COVID-19 transmission may be high? (this may include long-term care, correctional and retirement facilities; homeless shelters; assisted-living facilities and group homes ): No      Lab Results   Component Value Date    Ada Shoe Not Detected 12/09/2020     Past Medical History:   Diagnosis Date    Epilepsy [facility])     and recurrent seizures, one time age 13     Past Surgical History:   Procedure Laterality Date    ANTERIOR CRUCIATE LIGAMENT REPAIR Right      Current Outpatient Medications   Medication Sig Dispense Refill    albuterol (PROVENTIL HFA,VENTOLIN HFA) 90 mcg/act inhaler Patient to use 15 minutes prior to exercise 1 Inhaler 1    finasteride (PROPECIA) 1 MG tablet Take 1 mg by mouth daily  11    ketoconazole (NIZORAL) 2 % shampoo       phentermine 37 5 MG capsule TAKE 1 CAPSULE BY MOUTH EVERY MORNING 30 capsule 2    topiramate (TOPAMAX) 25 mg tablet TAKE 1 TABLET BY MOUTH TWICE A  tablet 1     No current facility-administered medications for this visit  No Known Allergies    Review of Systems   Constitutional: Negative  Negative for chills, fatigue and fever  HENT: Negative  Negative for congestion, rhinorrhea and sore throat  Eyes: Negative  Respiratory: Negative  Negative for cough, chest tightness and shortness of breath  Cardiovascular: Negative  Gastrointestinal: Negative  Negative for abdominal pain, diarrhea, nausea and vomiting  Endocrine: Negative  Genitourinary: Negative  Musculoskeletal: Negative  Negative for myalgias  Skin: Negative  Allergic/Immunologic: Negative  Neurological: Negative  Negative for headaches  Hematological: Negative  Psychiatric/Behavioral: Negative  Objective:    Vitals:       Physical Exam  Constitutional:       General: He is not in acute distress  Appearance: Normal appearance  He is not ill-appearing, toxic-appearing or diaphoretic  HENT:      Head: Normocephalic and atraumatic  Right Ear: External ear normal       Left Ear: External ear normal       Nose: Nose normal    Pulmonary:      Effort: Pulmonary effort is normal    Neurological:      Mental Status: He is alert  VIRTUAL VISIT DISCLAIMER    Brandan Perry acknowledges that he has consented to an online visit or consultation   He understands that the online visit is based solely on information provided by him, and that, in the absence of a face-to-face physical evaluation by the physician, the diagnosis he receives is both limited and provisional in terms of accuracy and completeness  This is not intended to replace a full medical face-to-face evaluation by the physician  Brandan Perry understands and accepts these terms

## 2021-02-19 NOTE — TELEPHONE ENCOUNTER
Pt called in and said his employer wants him to have a rapid test tomorrow,  He wants to know if this is ok Tomorrow will be 6 days after exposure and he has not symptoms   Please advise

## 2021-02-20 ENCOUNTER — TRANSCRIBE ORDERS (OUTPATIENT)
Dept: URGENT CARE | Age: 41
End: 2021-02-20

## 2021-02-20 DIAGNOSIS — Z20.822 COVID-19 RULED OUT: Primary | ICD-10-CM

## 2021-02-20 PROCEDURE — U0005 INFEC AGEN DETEC AMPLI PROBE: HCPCS | Performed by: FAMILY MEDICINE

## 2021-02-20 PROCEDURE — U0003 INFECTIOUS AGENT DETECTION BY NUCLEIC ACID (DNA OR RNA); SEVERE ACUTE RESPIRATORY SYNDROME CORONAVIRUS 2 (SARS-COV-2) (CORONAVIRUS DISEASE [COVID-19]), AMPLIFIED PROBE TECHNIQUE, MAKING USE OF HIGH THROUGHPUT TECHNOLOGIES AS DESCRIBED BY CMS-2020-01-R: HCPCS | Performed by: FAMILY MEDICINE

## 2021-02-21 LAB — SARS-COV-2 RNA RESP QL NAA+PROBE: NEGATIVE

## 2021-02-23 NOTE — TELEPHONE ENCOUNTER
Okay to represcribe cough medication as well as doxycycline 100 mg twice daily 28   No refills Patient called stating that she may have a bacterial infection. Patient states that it started a week ago. Patient is having cramping and spotting every so often. Patient states that urine is gold and hazy and has lower back pain. Patient would like to discuss with a nurse and see how she should proceed. Pharmacy noted.    Please call back.  Thank you.

## 2021-04-29 DIAGNOSIS — E66.9 OBESITY (BMI 30-39.9): ICD-10-CM

## 2021-04-29 RX ORDER — PHENTERMINE HYDROCHLORIDE 37.5 MG/1
37.5 CAPSULE ORAL EVERY MORNING
Qty: 30 CAPSULE | Refills: 0 | Status: SHIPPED | OUTPATIENT
Start: 2021-04-29 | End: 2021-06-28 | Stop reason: SDUPTHER

## 2021-04-29 RX ORDER — TOPIRAMATE 25 MG/1
25 TABLET ORAL 2 TIMES DAILY
Qty: 180 TABLET | Refills: 1 | Status: SHIPPED | OUTPATIENT
Start: 2021-04-29 | End: 2021-06-28 | Stop reason: SDUPTHER

## 2021-05-28 ENCOUNTER — OFFICE VISIT (OUTPATIENT)
Dept: FAMILY MEDICINE CLINIC | Facility: CLINIC | Age: 41
End: 2021-05-28
Payer: COMMERCIAL

## 2021-05-28 VITALS
SYSTOLIC BLOOD PRESSURE: 122 MMHG | TEMPERATURE: 96.9 F | WEIGHT: 195 LBS | HEIGHT: 66 IN | BODY MASS INDEX: 31.34 KG/M2 | DIASTOLIC BLOOD PRESSURE: 84 MMHG

## 2021-05-28 DIAGNOSIS — S03.00XA DISLOCATION OF TEMPOROMANDIBULAR JOINT, INITIAL ENCOUNTER: ICD-10-CM

## 2021-05-28 DIAGNOSIS — K12.2 ORAL ABSCESS: Primary | ICD-10-CM

## 2021-05-28 PROCEDURE — 3008F BODY MASS INDEX DOCD: CPT | Performed by: FAMILY MEDICINE

## 2021-05-28 PROCEDURE — 99213 OFFICE O/P EST LOW 20 MIN: CPT | Performed by: FAMILY MEDICINE

## 2021-05-28 PROCEDURE — 1036F TOBACCO NON-USER: CPT | Performed by: FAMILY MEDICINE

## 2021-05-28 PROCEDURE — 3725F SCREEN DEPRESSION PERFORMED: CPT | Performed by: FAMILY MEDICINE

## 2021-05-28 RX ORDER — AMOXICILLIN AND CLAVULANATE POTASSIUM 875; 125 MG/1; MG/1
1 TABLET, FILM COATED ORAL EVERY 12 HOURS SCHEDULED
Qty: 14 TABLET | Refills: 0 | Status: SHIPPED | OUTPATIENT
Start: 2021-05-28 | End: 2021-06-04

## 2021-06-01 PROBLEM — S03.00XA TMJ (DISLOCATION OF TEMPOROMANDIBULAR JOINT): Status: ACTIVE | Noted: 2021-06-01

## 2021-06-01 NOTE — PROGRESS NOTES
Assessment/Plan:     64-year-old male with:  Oral abscess and TMJ syndrome discussed trying to stick to soft foods and mouth guard encouraged follow-up with his dentist will give antibiotic for breakthrough symptoms but advised to call back if not improving worsening    No problem-specific Assessment & Plan notes found for this encounter  Diagnoses and all orders for this visit:    Oral abscess  -     amoxicillin-clavulanate (Augmentin) 875-125 mg per tablet; Take 1 tablet by mouth every 12 (twelve) hours for 7 days    Dislocation of temporomandibular joint, initial encounter          Subjective:     Chief Complaint   Patient presents with    Jaw Pain     pt states that he has pain in his jaw on the left side and causes pain while chewing and makes a popping sound         Patient ID: Kishor Hubbard is a 35 y o  male  Patient is a 64-year-old male who presents for follow-up on jaw pain no fevers chills nausea vomiting tolerating p o  intake      The following portions of the patient's history were reviewed and updated as appropriate: allergies, current medications, past family history, past medical history, past social history, past surgical history and problem list     Review of Systems   Constitutional: Negative  HENT: Negative  Eyes: Negative  Respiratory: Negative  Cardiovascular: Negative  Gastrointestinal: Negative  Endocrine: Negative  Genitourinary: Negative  Musculoskeletal: Negative  Allergic/Immunologic: Negative  Neurological: Negative  Hematological: Negative  Psychiatric/Behavioral: Negative  All other systems reviewed and are negative  Objective:      /84 (BP Location: Right arm, Patient Position: Sitting, Cuff Size: Standard)   Temp (!) 96 9 °F (36 1 °C)   Ht 5' 6" (1 676 m)   Wt 88 5 kg (195 lb)   BMI 31 47 kg/m²          Physical Exam  Constitutional:       Appearance: He is well-developed  HENT:      Head: Atraumatic        Right Ear: External ear normal       Left Ear: External ear normal    Eyes:      Conjunctiva/sclera: Conjunctivae normal       Pupils: Pupils are equal, round, and reactive to light  Neck:      Musculoskeletal: Normal range of motion  Pulmonary:      Effort: Pulmonary effort is normal  No respiratory distress  Abdominal:      General: There is no distension  Musculoskeletal: Normal range of motion  Skin:     General: Skin is warm and dry  Neurological:      Mental Status: He is alert and oriented to person, place, and time  Cranial Nerves: No cranial nerve deficit  Psychiatric:         Behavior: Behavior normal          Thought Content:  Thought content normal          Judgment: Judgment normal

## 2021-06-28 ENCOUNTER — OFFICE VISIT (OUTPATIENT)
Dept: FAMILY MEDICINE CLINIC | Facility: CLINIC | Age: 41
End: 2021-06-28
Payer: COMMERCIAL

## 2021-06-28 VITALS
DIASTOLIC BLOOD PRESSURE: 90 MMHG | TEMPERATURE: 97.1 F | BODY MASS INDEX: 30.73 KG/M2 | HEIGHT: 66 IN | WEIGHT: 191.2 LBS | SYSTOLIC BLOOD PRESSURE: 120 MMHG

## 2021-06-28 DIAGNOSIS — E66.9 OBESITY (BMI 30-39.9): ICD-10-CM

## 2021-06-28 DIAGNOSIS — L65.9 HAIR LOSS: Primary | ICD-10-CM

## 2021-06-28 DIAGNOSIS — K14.8 TONGUE LESION: ICD-10-CM

## 2021-06-28 DIAGNOSIS — R79.89 DECREASED TESTOSTERONE LEVEL: ICD-10-CM

## 2021-06-28 DIAGNOSIS — E66.9 OBESITY, UNSPECIFIED CLASSIFICATION, UNSPECIFIED OBESITY TYPE, UNSPECIFIED WHETHER SERIOUS COMORBIDITY PRESENT: Primary | ICD-10-CM

## 2021-06-28 PROCEDURE — 99213 OFFICE O/P EST LOW 20 MIN: CPT | Performed by: FAMILY MEDICINE

## 2021-06-28 PROCEDURE — 3008F BODY MASS INDEX DOCD: CPT | Performed by: FAMILY MEDICINE

## 2021-06-28 PROCEDURE — 1036F TOBACCO NON-USER: CPT | Performed by: FAMILY MEDICINE

## 2021-06-28 RX ORDER — TOPIRAMATE 25 MG/1
25 TABLET ORAL 2 TIMES DAILY
Qty: 180 TABLET | Refills: 1 | Status: SHIPPED | OUTPATIENT
Start: 2021-06-28 | End: 2021-11-08

## 2021-06-28 RX ORDER — PHENTERMINE HYDROCHLORIDE 37.5 MG/1
37.5 CAPSULE ORAL EVERY MORNING
Qty: 30 CAPSULE | Refills: 0 | Status: SHIPPED | OUTPATIENT
Start: 2021-06-28 | End: 2021-08-30

## 2021-06-28 RX ORDER — FINASTERIDE 1 MG/1
1 TABLET, FILM COATED ORAL DAILY
Qty: 30 TABLET | Refills: 11 | Status: SHIPPED | OUTPATIENT
Start: 2021-06-28 | End: 2021-08-30

## 2021-06-28 NOTE — PROGRESS NOTES
Assessment/Plan: patient go to oral surgeon  Patient use other medications per routine  Diagnoses and all orders for this visit:    Hair loss  -     finasteride (PROPECIA) 1 MG tablet; Take 1 tablet (1 mg total) by mouth daily    Obesity (BMI 30-39 9)  -     phentermine 37 5 MG capsule; Take 1 capsule (37 5 mg total) by mouth every morning  -     topiramate (TOPAMAX) 25 mg tablet; Take 1 tablet (25 mg total) by mouth 2 (two) times a day    Tongue lesion  -     Ambulatory referral to Oral Maxillofacial Surgery; Future            Subjective:        Patient ID: Matias Madrigal is a 35 y o  male  The patient  With bubble on left side of tongue for the past 3 months  No Fever  Patient also needs refills for chronic conditions        The following portions of the patient's history were reviewed and updated as appropriate: allergies, current medications, past family history, past medical history, past social history, past surgical history and problem list       Review of Systems   Constitutional: Negative  HENT:        Tongue lesion   Eyes: Negative  Respiratory: Negative  Cardiovascular: Negative  Gastrointestinal: Negative  Endocrine: Negative  Genitourinary: Negative  Musculoskeletal: Negative  Skin: Negative  Allergic/Immunologic: Negative  Neurological: Negative  Hematological: Negative  Psychiatric/Behavioral: Negative  Objective:      BMI Counseling: Body mass index is 30 86 kg/m²  The BMI is above normal  Nutrition recommendations include decreasing portion sizes  Exercise recommendations include moderate physical activity 150 minutes/week  Depression Screening and Follow-up Plan: Clincally patient does not have depression  No treatment is required               /90 (BP Location: Right arm, Patient Position: Sitting, Cuff Size: Standard)   Temp (!) 97 1 °F (36 2 °C) (Tympanic)   Ht 5' 6" (1 676 m)   Wt 86 7 kg (191 lb 3 2 oz)   BMI 30 86 kg/m² Current Diagnoses

Sebaceous cyst (06/15/18)
Other benign mammary dysplasias of left breast (06/15/18)
Other specified fracture of unspecified pubis, initial encounter for closed fracture (06/15/18)

Surgery Performed

Operation Date: 06/14/18 15:15
Actual Procedures
p Excision Breast Cyst(Left) - Preston hSi MD

Physical Therapy Treatment Note

M2 PT-IP Current Condition                                 Start:  06/15/18 12:22
Freq:   AS NEEDED                                          Status: Active        
Protocol:                                                                        
 Document     06/19/18 11:19  AB  (Rec: 06/19/18 11:24  AB  [id number])
 Physical Therapy Current Condition
     Current Condition
      Evaluation Date                            06/15/18
      Treatment Diagnosis                        s/p L lumpectomy, pubis fx due
                                                 to fall, impaired gait
      Onset Date                                 06/14/18
     Weight Bearing Status
      Weight Bearing Status                      Weight Bear as Tolerated
      Allowed Weight Bearing Amount (enter %     Per Dr. Constantino's addendum
       or #) (%)                                 note after MRI result: pt WBAT
                                                 on RLE.
M3 PT-IP Subjective                                        Start:  06/15/18 12:22
Freq:   AS NEEDED                                          Status: Active        
Protocol:                                                                        
 Document     06/19/18 11:19  AB  (Rec: 06/19/18 11:24  AB  [id number])
 Subjective
     Physical Therapy Visit Type
      Type                                       Treatment Note
      Visit Start Time                           09:53
      Visit Stop Time                            10:05
      Total Visit Minutes                        13
      Number of PTA Visits                       1
     Physical Therapy Visit Comments
      Patient Comments                           I feel ok but not 100%
 Therapy Pain Assessment
     Pain
      When Pain Assessed                         During Mobility
     Pain Present
      Pain Present                               Pain Reported
     Location
      Rt Hip
       Scale Used                                pain scale not stated
M4 PT-IP Mobility and Gait                                 Start:  06/15/18 12:22
Freq:   AS NEEDED                                          Status: Active        
Protocol:                                                                        
 Document     06/19/18 11:19  AB  (Rec: 06/19/18 11:24  AB  [id number])
 PT-Transfer Assessment
     Sit to and From Stand
       Sit to and from Stand                     Minimal Assistance
     Equipment
       Transfer Assistive Device                 Gait Belt
                                                 Front Wheeled Walker
 Gait Assessment
     Gait
       Gait Assistance Required:                 Minimum Assistance
       Distance (Feet) (feet)                    24
       Able to Maintain Weight Bearing Status    Yes
        During Gait                              
     Assistive Devices
       Assistive Device                          Gait Belt
                                                 Front Wheeled Walker
       Orthotic/Prosthetic Devices or Brace:     No
     Gait Deviations
       General Gait Pattern                      Antalgic
                                                 Decreased Stride Length
                                                 Decreased Feet Clearance
     Factors Limiting Gait Function
       Factors Limiting Gait Function            Decreased Activity Tolerance
                                                 Decreased Strength
                                                 Pain
                                                 Poor Balance
                         
[id number]|2018-06-15 15:19:30|2018-06-15 15:19:30|[email or web address]|||| Physical Exam

## 2021-07-22 ENCOUNTER — APPOINTMENT (OUTPATIENT)
Dept: URGENT CARE | Facility: MEDICAL CENTER | Age: 41
End: 2021-07-22
Payer: OTHER MISCELLANEOUS

## 2021-07-22 PROCEDURE — 99283 EMERGENCY DEPT VISIT LOW MDM: CPT | Performed by: PHYSICIAN ASSISTANT

## 2021-07-22 PROCEDURE — G0382 LEV 3 HOSP TYPE B ED VISIT: HCPCS | Performed by: PHYSICIAN ASSISTANT

## 2021-07-22 PROCEDURE — 90471 IMMUNIZATION ADMIN: CPT

## 2021-07-22 PROCEDURE — 90715 TDAP VACCINE 7 YRS/> IM: CPT

## 2021-08-27 PROCEDURE — 88305 TISSUE EXAM BY PATHOLOGIST: CPT | Performed by: PATHOLOGY

## 2021-08-30 ENCOUNTER — OFFICE VISIT (OUTPATIENT)
Dept: FAMILY MEDICINE CLINIC | Facility: CLINIC | Age: 41
End: 2021-08-30
Payer: COMMERCIAL

## 2021-08-30 VITALS
BODY MASS INDEX: 31.27 KG/M2 | SYSTOLIC BLOOD PRESSURE: 130 MMHG | HEIGHT: 66 IN | DIASTOLIC BLOOD PRESSURE: 88 MMHG | WEIGHT: 194.6 LBS

## 2021-08-30 DIAGNOSIS — N45.1 EPIDIDYMITIS: Primary | ICD-10-CM

## 2021-08-30 DIAGNOSIS — N50.811 RIGHT TESTICULAR PAIN: ICD-10-CM

## 2021-08-30 PROCEDURE — 3008F BODY MASS INDEX DOCD: CPT | Performed by: FAMILY MEDICINE

## 2021-08-30 PROCEDURE — 1036F TOBACCO NON-USER: CPT | Performed by: FAMILY MEDICINE

## 2021-08-30 PROCEDURE — 99213 OFFICE O/P EST LOW 20 MIN: CPT | Performed by: FAMILY MEDICINE

## 2021-08-30 RX ORDER — DOXYCYCLINE HYCLATE 100 MG/1
100 CAPSULE ORAL EVERY 12 HOURS SCHEDULED
Qty: 20 CAPSULE | Refills: 0 | Status: SHIPPED | OUTPATIENT
Start: 2021-08-30 | End: 2021-09-09

## 2021-08-30 NOTE — PROGRESS NOTES
Assessment/Plan: patient use Motrin as needed  Patient go for ultrasound of the scrotum and testicles  Patient will be placed on doxycycline twice daily for 10 days  Diagnoses and all orders for this visit:    Epididymitis  -     doxycycline hyclate (VIBRAMYCIN) 100 mg capsule; Take 1 capsule (100 mg total) by mouth every 12 (twelve) hours for 10 days    Right testicular pain  -     US scrotum and testicles; Future            Subjective:        Patient ID: Miguel Angel Dallas is a 35 y o  male  Patient is here with some right inguinal pain with some radiation to the right testicle over the past 3-4 weeks  No problems with urination or defecation  No fevers or chills  No lump or mass noted by the patient  No trauma    Groin Pain          The following portions of the patient's history were reviewed and updated as appropriate: allergies, current medications, past family history, past medical history, past social history, past surgical history and problem list       Review of Systems   Constitutional: Negative  HENT: Negative  Eyes: Negative  Respiratory: Negative  Cardiovascular: Negative  Gastrointestinal:        Right inguinal pain   Endocrine: Negative  Genitourinary: Negative  Musculoskeletal: Negative  Skin: Negative  Allergic/Immunologic: Negative  Neurological: Negative  Hematological: Negative  Psychiatric/Behavioral: Negative  Objective:        Depression Screening and Follow-up Plan: Clincally patient does not have depression  No treatment is required  /88 (BP Location: Right arm, Patient Position: Sitting, Cuff Size: Standard)   Ht 5' 6" (1 676 m)   Wt 88 3 kg (194 lb 9 6 oz)   BMI 31 41 kg/m²          Physical Exam  Constitutional:       General: He is not in acute distress  Appearance: Normal appearance  He is not ill-appearing, toxic-appearing or diaphoretic  HENT:      Head: Normocephalic and atraumatic     Abdominal: General: Abdomen is flat  There is no distension  Palpations: Abdomen is soft  Tenderness: There is no abdominal tenderness  There is no right CVA tenderness, left CVA tenderness, guarding or rebound  Hernia: No hernia is present  Genitourinary:     Comments: Epididymal head cyst with mild discomfort with palpation  No inguinal hernia noted bilaterally  Musculoskeletal:         General: Tenderness present  Skin:     Capillary Refill: Capillary refill takes less than 2 seconds  Neurological:      General: No focal deficit present  Mental Status: He is alert  Psychiatric:         Mood and Affect: Mood normal          Behavior: Behavior normal          Thought Content:  Thought content normal

## 2021-09-01 ENCOUNTER — HOSPITAL ENCOUNTER (OUTPATIENT)
Dept: ULTRASOUND IMAGING | Facility: HOSPITAL | Age: 41
Discharge: HOME | End: 2021-09-01
Payer: COMMERCIAL

## 2021-09-01 DIAGNOSIS — N50.811 RIGHT TESTICULAR PAIN: ICD-10-CM

## 2021-09-01 PROCEDURE — 76870 US EXAM SCROTUM: CPT

## 2021-09-17 ENCOUNTER — TELEPHONE (OUTPATIENT)
Dept: FAMILY MEDICINE CLINIC | Facility: CLINIC | Age: 41
End: 2021-09-17

## 2021-09-17 DIAGNOSIS — M77.12 LEFT LATERAL EPICONDYLITIS: Primary | ICD-10-CM

## 2021-09-17 DIAGNOSIS — M77.11 RIGHT LATERAL EPICONDYLITIS: ICD-10-CM

## 2021-09-17 DIAGNOSIS — N50.811 RIGHT TESTICULAR PAIN: ICD-10-CM

## 2021-10-01 ENCOUNTER — LAB (OUTPATIENT)
Dept: LAB | Facility: HOSPITAL | Age: 41
End: 2021-10-01
Payer: COMMERCIAL

## 2021-10-01 DIAGNOSIS — N50.811 RIGHT TESTICULAR PAIN: ICD-10-CM

## 2021-10-01 DIAGNOSIS — M77.11 RIGHT LATERAL EPICONDYLITIS: ICD-10-CM

## 2021-10-01 DIAGNOSIS — M77.12 LEFT LATERAL EPICONDYLITIS: ICD-10-CM

## 2021-10-01 LAB
ALBUMIN SERPL BCP-MCNC: 3.8 G/DL (ref 3.5–5)
ALP SERPL-CCNC: 84 U/L (ref 46–116)
ALT SERPL W P-5'-P-CCNC: 43 U/L (ref 12–78)
ANION GAP SERPL CALCULATED.3IONS-SCNC: 6 MMOL/L (ref 4–13)
AST SERPL W P-5'-P-CCNC: 24 U/L (ref 5–45)
BACTERIA UR QL AUTO: ABNORMAL /HPF
BASOPHILS # BLD AUTO: 0.05 THOUSANDS/ΜL (ref 0–0.1)
BASOPHILS NFR BLD AUTO: 1 % (ref 0–1)
BILIRUB SERPL-MCNC: 0.46 MG/DL (ref 0.2–1)
BILIRUB UR QL STRIP: NEGATIVE
BUN SERPL-MCNC: 26 MG/DL (ref 5–25)
CALCIUM SERPL-MCNC: 9.5 MG/DL (ref 8.3–10.1)
CHLORIDE SERPL-SCNC: 106 MMOL/L (ref 100–108)
CLARITY UR: CLEAR
CO2 SERPL-SCNC: 30 MMOL/L (ref 21–32)
COLOR UR: YELLOW
CREAT SERPL-MCNC: 1.43 MG/DL (ref 0.6–1.3)
EOSINOPHIL # BLD AUTO: 0.57 THOUSAND/ΜL (ref 0–0.61)
EOSINOPHIL NFR BLD AUTO: 9 % (ref 0–6)
ERYTHROCYTE [DISTWIDTH] IN BLOOD BY AUTOMATED COUNT: 12 % (ref 11.6–15.1)
GFR SERPL CREATININE-BSD FRML MDRD: 60 ML/MIN/1.73SQ M
GLUCOSE P FAST SERPL-MCNC: 103 MG/DL (ref 65–99)
GLUCOSE UR STRIP-MCNC: NEGATIVE MG/DL
HCT VFR BLD AUTO: 46.6 % (ref 36.5–49.3)
HGB BLD-MCNC: 15.2 G/DL (ref 12–17)
HGB UR QL STRIP.AUTO: NEGATIVE
IMM GRANULOCYTES # BLD AUTO: 0.03 THOUSAND/UL (ref 0–0.2)
IMM GRANULOCYTES NFR BLD AUTO: 1 % (ref 0–2)
KETONES UR STRIP-MCNC: NEGATIVE MG/DL
LEUKOCYTE ESTERASE UR QL STRIP: NEGATIVE
LIPASE SERPL-CCNC: 119 U/L (ref 73–393)
LYMPHOCYTES # BLD AUTO: 1.92 THOUSANDS/ΜL (ref 0.6–4.47)
LYMPHOCYTES NFR BLD AUTO: 29 % (ref 14–44)
MCH RBC QN AUTO: 32.4 PG (ref 26.8–34.3)
MCHC RBC AUTO-ENTMCNC: 32.6 G/DL (ref 31.4–37.4)
MCV RBC AUTO: 99 FL (ref 82–98)
MONOCYTES # BLD AUTO: 0.53 THOUSAND/ΜL (ref 0.17–1.22)
MONOCYTES NFR BLD AUTO: 8 % (ref 4–12)
NEUTROPHILS # BLD AUTO: 3.52 THOUSANDS/ΜL (ref 1.85–7.62)
NEUTS SEG NFR BLD AUTO: 52 % (ref 43–75)
NITRITE UR QL STRIP: NEGATIVE
NON-SQ EPI CELLS URNS QL MICRO: ABNORMAL /HPF
NRBC BLD AUTO-RTO: 0 /100 WBCS
PH UR STRIP.AUTO: 5.5 [PH]
PLATELET # BLD AUTO: 181 THOUSANDS/UL (ref 149–390)
PMV BLD AUTO: 11.9 FL (ref 8.9–12.7)
POTASSIUM SERPL-SCNC: 4.9 MMOL/L (ref 3.5–5.3)
PROT SERPL-MCNC: 7.2 G/DL (ref 6.4–8.2)
PROT UR STRIP-MCNC: NEGATIVE MG/DL
RBC # BLD AUTO: 4.69 MILLION/UL (ref 3.88–5.62)
RBC #/AREA URNS AUTO: ABNORMAL /HPF
SODIUM SERPL-SCNC: 142 MMOL/L (ref 136–145)
SP GR UR STRIP.AUTO: >=1.03 (ref 1–1.03)
UROBILINOGEN UR QL STRIP.AUTO: 0.2 E.U./DL
WBC # BLD AUTO: 6.62 THOUSAND/UL (ref 4.31–10.16)
WBC #/AREA URNS AUTO: ABNORMAL /HPF

## 2021-10-01 PROCEDURE — 81001 URINALYSIS AUTO W/SCOPE: CPT

## 2021-10-01 PROCEDURE — 36415 COLL VENOUS BLD VENIPUNCTURE: CPT

## 2021-10-01 PROCEDURE — 80053 COMPREHEN METABOLIC PANEL: CPT

## 2021-10-01 PROCEDURE — 83690 ASSAY OF LIPASE: CPT

## 2021-10-01 PROCEDURE — 85025 COMPLETE CBC W/AUTO DIFF WBC: CPT

## 2021-10-06 ENCOUNTER — TELEPHONE (OUTPATIENT)
Dept: UROLOGY | Facility: MEDICAL CENTER | Age: 41
End: 2021-10-06

## 2021-10-07 ENCOUNTER — TELEPHONE (OUTPATIENT)
Dept: FAMILY MEDICINE CLINIC | Facility: CLINIC | Age: 41
End: 2021-10-07

## 2021-10-11 ENCOUNTER — HOSPITAL ENCOUNTER (OUTPATIENT)
Dept: RADIOLOGY | Facility: HOSPITAL | Age: 41
Discharge: HOME | End: 2021-10-11
Payer: COMMERCIAL

## 2021-10-11 DIAGNOSIS — N50.811 RIGHT TESTICULAR PAIN: ICD-10-CM

## 2021-10-11 PROCEDURE — 74177 CT ABD & PELVIS W/CONTRAST: CPT

## 2021-10-11 PROCEDURE — G1004 CDSM NDSC: HCPCS

## 2021-10-11 RX ADMIN — IOHEXOL 85 ML: 350 INJECTION, SOLUTION INTRAVENOUS at 13:46

## 2021-10-13 ENCOUNTER — OFFICE VISIT (OUTPATIENT)
Dept: UROLOGY | Facility: CLINIC | Age: 41
End: 2021-10-13
Payer: COMMERCIAL

## 2021-10-13 VITALS
WEIGHT: 196 LBS | BODY MASS INDEX: 31.5 KG/M2 | DIASTOLIC BLOOD PRESSURE: 80 MMHG | HEIGHT: 66 IN | SYSTOLIC BLOOD PRESSURE: 130 MMHG

## 2021-10-13 DIAGNOSIS — M77.12 LEFT LATERAL EPICONDYLITIS: ICD-10-CM

## 2021-10-13 DIAGNOSIS — M77.11 RIGHT LATERAL EPICONDYLITIS: ICD-10-CM

## 2021-10-13 DIAGNOSIS — N50.811 RIGHT TESTICULAR PAIN: ICD-10-CM

## 2021-10-13 DIAGNOSIS — Z12.5 PROSTATE CANCER SCREENING: Primary | ICD-10-CM

## 2021-10-13 PROCEDURE — 99242 OFF/OP CONSLTJ NEW/EST SF 20: CPT | Performed by: NURSE PRACTITIONER

## 2021-10-13 PROCEDURE — 1036F TOBACCO NON-USER: CPT | Performed by: NURSE PRACTITIONER

## 2021-10-13 PROCEDURE — 87086 URINE CULTURE/COLONY COUNT: CPT | Performed by: NURSE PRACTITIONER

## 2021-10-13 PROCEDURE — 3008F BODY MASS INDEX DOCD: CPT | Performed by: NURSE PRACTITIONER

## 2021-10-14 LAB — BACTERIA UR CULT: NORMAL

## 2021-10-15 ENCOUNTER — TELEPHONE (OUTPATIENT)
Dept: FAMILY MEDICINE CLINIC | Facility: CLINIC | Age: 41
End: 2021-10-15

## 2021-10-25 ENCOUNTER — TELEPHONE (OUTPATIENT)
Dept: FAMILY MEDICINE CLINIC | Facility: CLINIC | Age: 41
End: 2021-10-25

## 2021-10-25 DIAGNOSIS — R10.9 ABDOMINAL PAIN, UNSPECIFIED ABDOMINAL LOCATION: Primary | ICD-10-CM

## 2021-10-25 DIAGNOSIS — M25.559 HIP PAIN: ICD-10-CM

## 2021-10-26 ENCOUNTER — APPOINTMENT (OUTPATIENT)
Dept: LAB | Facility: HOSPITAL | Age: 41
End: 2021-10-26
Payer: COMMERCIAL

## 2021-10-26 DIAGNOSIS — Z12.5 PROSTATE CANCER SCREENING: ICD-10-CM

## 2021-10-26 LAB — PSA SERPL-MCNC: 0.9 NG/ML (ref 0–4)

## 2021-10-26 PROCEDURE — G0103 PSA SCREENING: HCPCS

## 2021-10-26 PROCEDURE — 36415 COLL VENOUS BLD VENIPUNCTURE: CPT

## 2021-11-08 ENCOUNTER — OFFICE VISIT (OUTPATIENT)
Dept: FAMILY MEDICINE CLINIC | Facility: CLINIC | Age: 41
End: 2021-11-08
Payer: COMMERCIAL

## 2021-11-08 VITALS
BODY MASS INDEX: 31.79 KG/M2 | OXYGEN SATURATION: 97 % | WEIGHT: 197.8 LBS | HEIGHT: 66 IN | TEMPERATURE: 97.4 F | SYSTOLIC BLOOD PRESSURE: 110 MMHG | HEART RATE: 64 BPM | DIASTOLIC BLOOD PRESSURE: 80 MMHG

## 2021-11-08 DIAGNOSIS — M25.551 RIGHT HIP PAIN: Primary | ICD-10-CM

## 2021-11-08 DIAGNOSIS — E66.9 OBESITY (BMI 30-39.9): ICD-10-CM

## 2021-11-08 PROCEDURE — 3008F BODY MASS INDEX DOCD: CPT | Performed by: FAMILY MEDICINE

## 2021-11-08 PROCEDURE — 1036F TOBACCO NON-USER: CPT | Performed by: FAMILY MEDICINE

## 2021-11-08 PROCEDURE — 99213 OFFICE O/P EST LOW 20 MIN: CPT | Performed by: FAMILY MEDICINE

## 2021-11-08 RX ORDER — TOPIRAMATE 25 MG/1
25 TABLET ORAL 2 TIMES DAILY
Qty: 180 TABLET | Refills: 1 | Status: SHIPPED | OUTPATIENT
Start: 2021-11-08

## 2021-11-08 RX ORDER — PHENTERMINE HYDROCHLORIDE 37.5 MG/1
37.5 CAPSULE ORAL EVERY MORNING
Qty: 30 CAPSULE | Refills: 3 | Status: SHIPPED | OUTPATIENT
Start: 2021-11-08

## 2021-11-10 ENCOUNTER — HOSPITAL ENCOUNTER (OUTPATIENT)
Dept: RADIOLOGY | Facility: HOSPITAL | Age: 41
Discharge: HOME | End: 2021-11-10
Payer: COMMERCIAL

## 2021-11-10 DIAGNOSIS — M25.551 RIGHT HIP PAIN: ICD-10-CM

## 2021-11-10 PROCEDURE — 73502 X-RAY EXAM HIP UNI 2-3 VIEWS: CPT

## 2021-11-10 PROCEDURE — 72110 X-RAY EXAM L-2 SPINE 4/>VWS: CPT

## 2021-11-11 ENCOUNTER — TELEPHONE (OUTPATIENT)
Dept: FAMILY MEDICINE CLINIC | Facility: CLINIC | Age: 41
End: 2021-11-11

## 2021-11-11 DIAGNOSIS — M25.551 RIGHT HIP PAIN: Primary | ICD-10-CM

## 2021-11-11 RX ORDER — CYCLOBENZAPRINE HCL 10 MG
TABLET ORAL
Qty: 30 TABLET | Refills: 2 | Status: SHIPPED | OUTPATIENT
Start: 2021-11-11

## 2021-11-16 DIAGNOSIS — R10.2 PELVIC PAIN: Primary | ICD-10-CM

## 2021-12-14 ENCOUNTER — TELEPHONE (OUTPATIENT)
Dept: FAMILY MEDICINE CLINIC | Facility: CLINIC | Age: 41
End: 2021-12-14

## 2021-12-14 NOTE — TELEPHONE ENCOUNTER
Pt is asking for  rx for his sinus infection , started Sunday   Congestion sinus drip , pressure nose  Pressure ears    Pt is saying is the samething he gets every year and you send him zpak per patient

## 2021-12-15 ENCOUNTER — TELEPHONE (OUTPATIENT)
Dept: FAMILY MEDICINE CLINIC | Facility: CLINIC | Age: 41
End: 2021-12-15

## 2021-12-15 ENCOUNTER — OFFICE VISIT (OUTPATIENT)
Dept: FAMILY MEDICINE CLINIC | Facility: CLINIC | Age: 41
End: 2021-12-15
Payer: COMMERCIAL

## 2021-12-15 DIAGNOSIS — R50.9 FEVER, UNSPECIFIED FEVER CAUSE: ICD-10-CM

## 2021-12-15 DIAGNOSIS — R05.9 COUGH: Primary | ICD-10-CM

## 2021-12-15 PROCEDURE — U0005 INFEC AGEN DETEC AMPLI PROBE: HCPCS | Performed by: FAMILY MEDICINE

## 2021-12-15 PROCEDURE — 1036F TOBACCO NON-USER: CPT | Performed by: FAMILY MEDICINE

## 2021-12-15 PROCEDURE — U0003 INFECTIOUS AGENT DETECTION BY NUCLEIC ACID (DNA OR RNA); SEVERE ACUTE RESPIRATORY SYNDROME CORONAVIRUS 2 (SARS-COV-2) (CORONAVIRUS DISEASE [COVID-19]), AMPLIFIED PROBE TECHNIQUE, MAKING USE OF HIGH THROUGHPUT TECHNOLOGIES AS DESCRIBED BY CMS-2020-01-R: HCPCS | Performed by: FAMILY MEDICINE

## 2021-12-15 PROCEDURE — 3725F SCREEN DEPRESSION PERFORMED: CPT | Performed by: FAMILY MEDICINE

## 2021-12-15 PROCEDURE — 99213 OFFICE O/P EST LOW 20 MIN: CPT | Performed by: FAMILY MEDICINE

## 2021-12-16 LAB — SARS-COV-2 RNA RESP QL NAA+PROBE: POSITIVE

## 2021-12-17 PROBLEM — U07.1 COVID-19: Status: ACTIVE | Noted: 2021-12-17

## 2021-12-19 ENCOUNTER — HOSPITAL ENCOUNTER (OUTPATIENT)
Dept: INFUSION CENTER | Facility: HOSPITAL | Age: 41
Discharge: HOME | End: 2021-12-19
Payer: COMMERCIAL

## 2021-12-19 VITALS
HEART RATE: 71 BPM | RESPIRATION RATE: 18 BRPM | TEMPERATURE: 98.2 F | SYSTOLIC BLOOD PRESSURE: 132 MMHG | DIASTOLIC BLOOD PRESSURE: 82 MMHG | OXYGEN SATURATION: 92 %

## 2021-12-19 DIAGNOSIS — U07.1 COVID-19: Primary | ICD-10-CM

## 2021-12-19 PROCEDURE — M0245 HB BAMLAN AND ETESEV INF ADMIN: HCPCS | Performed by: FAMILY MEDICINE

## 2021-12-19 RX ORDER — ALBUTEROL SULFATE 90 UG/1
3 AEROSOL, METERED RESPIRATORY (INHALATION) ONCE AS NEEDED
Status: CANCELLED | OUTPATIENT
Start: 2021-12-19

## 2021-12-19 RX ORDER — SODIUM CHLORIDE 9 MG/ML
20 INJECTION, SOLUTION INTRAVENOUS ONCE AS NEEDED
Status: DISCONTINUED | OUTPATIENT
Start: 2021-12-19 | End: 2021-12-22 | Stop reason: HOSPADM

## 2021-12-19 RX ORDER — SODIUM CHLORIDE 9 MG/ML
20 INJECTION, SOLUTION INTRAVENOUS ONCE AS NEEDED
Status: CANCELLED | OUTPATIENT
Start: 2021-12-19

## 2021-12-19 RX ORDER — ACETAMINOPHEN 325 MG/1
650 TABLET ORAL ONCE AS NEEDED
Status: DISCONTINUED | OUTPATIENT
Start: 2021-12-19 | End: 2021-12-22 | Stop reason: HOSPADM

## 2021-12-19 RX ORDER — ALBUTEROL SULFATE 90 UG/1
3 AEROSOL, METERED RESPIRATORY (INHALATION) ONCE AS NEEDED
Status: DISCONTINUED | OUTPATIENT
Start: 2021-12-19 | End: 2021-12-22 | Stop reason: HOSPADM

## 2021-12-19 RX ORDER — ONDANSETRON 2 MG/ML
4 INJECTION INTRAMUSCULAR; INTRAVENOUS ONCE AS NEEDED
Status: CANCELLED | OUTPATIENT
Start: 2021-12-19

## 2021-12-19 RX ORDER — ONDANSETRON 2 MG/ML
4 INJECTION INTRAMUSCULAR; INTRAVENOUS ONCE AS NEEDED
Status: DISCONTINUED | OUTPATIENT
Start: 2021-12-19 | End: 2021-12-22 | Stop reason: HOSPADM

## 2021-12-19 RX ORDER — ACETAMINOPHEN 325 MG/1
650 TABLET ORAL ONCE AS NEEDED
Status: CANCELLED | OUTPATIENT
Start: 2021-12-19

## 2021-12-19 RX ADMIN — SODIUM CHLORIDE 20 ML/HR: 0.9 INJECTION, SOLUTION INTRAVENOUS at 08:37

## 2021-12-19 RX ADMIN — SODIUM CHLORIDE 2100 MG COMBINED: 9 INJECTION, SOLUTION INTRAVENOUS at 08:38

## 2021-12-20 ENCOUNTER — TELEPHONE (OUTPATIENT)
Dept: FAMILY MEDICINE CLINIC | Facility: CLINIC | Age: 41
End: 2021-12-20

## 2021-12-21 DIAGNOSIS — U07.1 COVID-19: Primary | ICD-10-CM

## 2021-12-21 RX ORDER — METHYLPREDNISOLONE 4 MG/1
TABLET ORAL
Qty: 21 EACH | Refills: 0 | Status: SHIPPED | OUTPATIENT
Start: 2021-12-21

## 2021-12-28 DIAGNOSIS — R09.81 SINUS CONGESTION: Primary | ICD-10-CM

## 2021-12-28 RX ORDER — CEFDINIR 300 MG/1
300 CAPSULE ORAL EVERY 12 HOURS SCHEDULED
Qty: 20 CAPSULE | Refills: 0 | Status: SHIPPED | OUTPATIENT
Start: 2021-12-28 | End: 2022-01-07

## 2021-12-28 NOTE — TELEPHONE ENCOUNTER
Patient is still having sinus issues and a cough  Would like a another prescription  He has been taking Mucinex for two weeks and is also wondering if he should stop? Please advise

## 2022-02-28 NOTE — PROGRESS NOTES
Addended byNicole Hayes on: 2/28/2022 11:10 AM     Modules accepted: Orders Ordered referral to [facility]

## 2022-04-25 ENCOUNTER — OFFICE VISIT (OUTPATIENT)
Dept: FAMILY MEDICINE CLINIC | Facility: CLINIC | Age: 42
End: 2022-04-25
Payer: COMMERCIAL

## 2022-04-25 VITALS
HEIGHT: 66 IN | WEIGHT: 201.6 LBS | OXYGEN SATURATION: 99 % | SYSTOLIC BLOOD PRESSURE: 110 MMHG | HEART RATE: 73 BPM | TEMPERATURE: 98.9 F | DIASTOLIC BLOOD PRESSURE: 60 MMHG | BODY MASS INDEX: 32.4 KG/M2

## 2022-04-25 DIAGNOSIS — M25.511 CHRONIC RIGHT SHOULDER PAIN: ICD-10-CM

## 2022-04-25 DIAGNOSIS — M75.42 IMPINGEMENT SYNDROME OF LEFT SHOULDER: Primary | ICD-10-CM

## 2022-04-25 DIAGNOSIS — S03.00XD DISLOCATION OF TEMPOROMANDIBULAR JOINT, SUBSEQUENT ENCOUNTER: ICD-10-CM

## 2022-04-25 DIAGNOSIS — G89.29 CHRONIC RIGHT SHOULDER PAIN: ICD-10-CM

## 2022-04-25 DIAGNOSIS — M25.551 RIGHT HIP PAIN: ICD-10-CM

## 2022-04-25 PROCEDURE — 99214 OFFICE O/P EST MOD 30 MIN: CPT | Performed by: FAMILY MEDICINE

## 2022-04-25 PROCEDURE — 1036F TOBACCO NON-USER: CPT | Performed by: FAMILY MEDICINE

## 2022-04-25 PROCEDURE — 3008F BODY MASS INDEX DOCD: CPT | Performed by: FAMILY MEDICINE

## 2022-04-25 NOTE — PROGRESS NOTES
Assessment/Plan: patient use ice and Motrin drip does for TMJ  Patient will be referred to orthopedics for ongoing bilateral shoulder pain and right hip pain  Patient see chiropractor  The the other records and studies reviewed  Diagnoses and all orders for this visit:    Impingement syndrome of left shoulder    Chronic right shoulder pain  -     Ambulatory Referral to Orthopedic Surgery; Future    Right hip pain    Dislocation of temporomandibular joint, subsequent encounter            Subjective:        Patient ID: Akila Limon is a 38 y o  male  Patient is here with the left shoulder  Chronic pain with history of surgery x2  No chest pain or shortness of breath  Patient also with some right-sided shoulder/arm  Patient with ongoing right hip pain  The following portions of the patient's history were reviewed and updated as appropriate: allergies, current medications, past family history, past medical history, past social history, past surgical history and problem list       Review of Systems   Constitutional: Negative  HENT: Positive for ear pain  Eyes: Negative  Respiratory: Negative  Cardiovascular: Negative  Gastrointestinal: Negative  Endocrine: Negative  Genitourinary: Negative  Musculoskeletal: Positive for arthralgias  Skin: Negative  Allergic/Immunologic: Negative  Neurological: Negative  Hematological: Negative  Psychiatric/Behavioral: Negative  Objective:      BMI Counseling: Body mass index is 32 54 kg/m²  The BMI is above normal  Nutrition recommendations include decreasing portion sizes  Exercise recommendations include moderate physical activity 150 minutes/week  Rationale for BMI follow-up plan is due to patient being overweight or obese  Depression Screening and Follow-up Plan: Clincally patient does not have depression  No treatment is required               /60 (BP Location: Left arm, Patient Position: Sitting, Cuff Size: Standard)   Pulse 73   Temp 98 9 °F (37 2 °C) (Temporal)   Ht 5' 6" (1 676 m)   Wt 91 4 kg (201 lb 9 6 oz) Comment: with steeltip boots on  SpO2 99%   BMI 32 54 kg/m²          Physical Exam  Vitals and nursing note reviewed  Constitutional:       Appearance: Normal appearance  He is not ill-appearing  HENT:      Head: Normocephalic and atraumatic  Right Ear: Tympanic membrane, ear canal and external ear normal       Left Ear: Tympanic membrane, ear canal and external ear normal       Nose: Nose normal       Mouth/Throat:      Mouth: Mucous membranes are moist       Pharynx: No oropharyngeal exudate or posterior oropharyngeal erythema  Cardiovascular:      Rate and Rhythm: Normal rate and regular rhythm  Pulses: Normal pulses  Heart sounds: Normal heart sounds  Pulmonary:      Effort: Pulmonary effort is normal       Breath sounds: Normal breath sounds  Musculoskeletal:         General: Tenderness present  Comments: Pain with palpation over the right TMJ   Neurological:      Mental Status: He is alert  Psychiatric:         Mood and Affect: Mood normal          Behavior: Behavior normal          Thought Content:  Thought content normal

## 2022-05-05 ENCOUNTER — OFFICE VISIT (OUTPATIENT)
Dept: OBGYN CLINIC | Facility: HOSPITAL | Age: 42
End: 2022-05-05
Payer: COMMERCIAL

## 2022-05-05 ENCOUNTER — HOSPITAL ENCOUNTER (OUTPATIENT)
Dept: RADIOLOGY | Facility: HOSPITAL | Age: 42
Discharge: HOME | End: 2022-05-05
Payer: COMMERCIAL

## 2022-05-05 VITALS
DIASTOLIC BLOOD PRESSURE: 82 MMHG | SYSTOLIC BLOOD PRESSURE: 145 MMHG | BODY MASS INDEX: 32.3 KG/M2 | HEIGHT: 66 IN | WEIGHT: 201 LBS | HEART RATE: 65 BPM

## 2022-05-05 DIAGNOSIS — G89.29 CHRONIC RIGHT SHOULDER PAIN: ICD-10-CM

## 2022-05-05 DIAGNOSIS — M25.511 CHRONIC RIGHT SHOULDER PAIN: ICD-10-CM

## 2022-05-05 DIAGNOSIS — M25.512 ACUTE PAIN OF LEFT SHOULDER: ICD-10-CM

## 2022-05-05 PROCEDURE — 99204 OFFICE O/P NEW MOD 45 MIN: CPT | Performed by: PHYSICIAN ASSISTANT

## 2022-05-05 PROCEDURE — 1036F TOBACCO NON-USER: CPT | Performed by: PHYSICIAN ASSISTANT

## 2022-05-05 PROCEDURE — 73030 X-RAY EXAM OF SHOULDER: CPT

## 2022-05-05 PROCEDURE — 3008F BODY MASS INDEX DOCD: CPT | Performed by: PHYSICIAN ASSISTANT

## 2022-05-05 NOTE — PROGRESS NOTES
Assessment & Plan:    Acute onset left shoulder pain after overhead weightlifting and hearing a loud crack within the shoulder joint  Pain improved within 24 hrs to the point where he was able to return to lifting weights  X-rays demonstrate significant GH arthritic changes with multiple loose bodies, no acute findings  He is s/p left shoulder labral repair over 12 years ago (Dr Kate Padilla) with repeat arthroscopy 5 years ago, unknown surgeon  It would be appropriate and recommended for the patient to return to surgeon of record for further evaluation  If he plans on transferring care to [facility], would recommend obtaining surgical records and imaging reports prior to meeting with one of our shoulder specialists  Formal outpatient physical therapy was offered however the patient's symptoms are basically resolved at this point and he is more interested in meeting with a surgeon to discuss further intervention on an elective basis if needed  Problem List Items Addressed This Visit        Other    Chronic right shoulder pain             Subjective:     Patient ID:  Alyssa Ford is a 38 y o  male    HPI     78-year-old male presenting for evaluation of his left shoulder  According to the patient, a few days ago the patient was at the gym and was doing overhead  Weight lifting and felt and heard a loud crack with immediate pain localized with to within his shoulder joint  He did not feel any dislocation or obvious deformity  Over the course of the next day or so the pain did improve with over-the-counter medications  He did present to an urgent care center where he was told he had multiple loose bodies in his shoulder without acute findings  He was referred here for further evaluation  Today,  The patient states that his pain has significantly improved with in just a few days to the point where he is able to weight lift without any significant issues    He feels soreness within the shoulder joint other than that pain is improved  No associated numbness and tingling going down the arm  Of note, the patient does have a history of shoulder dislocation x2 with subsequent labral repair done [facility] by Dr Scott Cortez roughly 12 years ago  He did well from this standpoint however he had to have repeat  Left shoulder arthroscopy for what he states was a clean out of the joint about five years ago  He does not remember the surgeon that performed this, and he is unsure where this was done  He thinks it may be [facility]  The following portions of the patient's history were reviewed and updated as appropriate: allergies, current medications, past family history, past medical history, past social history, past surgical history and problem list     Review of Systems     Objective:    Imaging:  Left shoulder x-ray dated 5/2/2022 from [facility] care center demonstrates degenerative changes of the shoulder joint with multiple osteophytes   Goats beard sign is evident  No formal report is available    No previous surgical records available      Vitals:    05/05/22 1406   BP: 145/82   Pulse: 65           Physical Exam     Orthopedic Examination:  Left shoulder    Inspection:  No open wounds  Previous incisions are healed  Shoulders are symmetric  No ecchymosis  No Mark deformity  No significant swelling  Palpation:  There is pain with deep palpation anterior shoulder  The clavicle and scapula are nontender to palpation  The trapezius muscle is minimally tender to palpation  Range-of-motion: 170 degrees of forward flexion, and shoulder abduction  80 degrees internal external rotation      Strength:  5/5 deltoid, supraspinatus, infraspinatus, subscapularis     Sensation:  Intact axillary muscular cutaneous median radial ulnar nerve distribution    Special Tests:    Negative drop-arm Negative empty can   discomfort with Griffith/impingement  Negative hook test   Negative speed's   Negative Oldwick's

## 2022-06-16 ENCOUNTER — OFFICE VISIT (OUTPATIENT)
Dept: OBGYN CLINIC | Facility: OTHER | Age: 42
End: 2022-06-16
Payer: COMMERCIAL

## 2022-06-16 VITALS
SYSTOLIC BLOOD PRESSURE: 131 MMHG | HEIGHT: 66 IN | WEIGHT: 201 LBS | BODY MASS INDEX: 32.3 KG/M2 | DIASTOLIC BLOOD PRESSURE: 83 MMHG | HEART RATE: 67 BPM

## 2022-06-16 DIAGNOSIS — M19.012 PRIMARY OSTEOARTHRITIS OF LEFT SHOULDER: Primary | ICD-10-CM

## 2022-06-16 DIAGNOSIS — M75.41 IMPINGEMENT SYNDROME OF RIGHT SHOULDER: ICD-10-CM

## 2022-06-16 PROCEDURE — 1036F TOBACCO NON-USER: CPT | Performed by: ORTHOPAEDIC SURGERY

## 2022-06-16 PROCEDURE — 3008F BODY MASS INDEX DOCD: CPT | Performed by: ORTHOPAEDIC SURGERY

## 2022-06-16 PROCEDURE — 99204 OFFICE O/P NEW MOD 45 MIN: CPT | Performed by: ORTHOPAEDIC SURGERY

## 2022-06-16 NOTE — PROGRESS NOTES
Assessment  Diagnoses and all orders for this visit:    Primary osteoarthritis of left shoulder    Impingement syndrome of right shoulder with mild GH osteoarthritis      Discussion and Plan:    The patient has an examination consistent with LEFT shoulder osteoarthritis  I have discussed with the patient the pathophysiology of this diagnosis and reviewed how the examination correlates with this diagnosis  Surgical vs conservative treatment options were discussed at length to included CS injections, PT, and activity modification  After discussing these treatment options  Explained the definitive treatment would be total shoulder arthoplasty  Recommend the patient continue activity modification  Discussed he does have over developed deltoid with are contributing to his pain  As for the right shoulder the patient is minimally symptomatic at this point and if he wishes to try anything physical therapy could be attempted to try to strengthen the rotator cuff and balance out the deltoid musculature which 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 and resistance training is certainly stronger than the rotator cuff  There is mild glenohumeral osteoarthritis on the x-ray but symptoms are more consistent with impingement on exam today    Subjective:   Patient ID: Kleber Kirk is a 38 y o  male      HPI  The patient presents with a chief complaint of bilateral shoulder pain left > right  The pain began several year(s) ago and is not associated with an acute injury  The patient describes the pain as aching and dull in intensity,  intermittent in timing, and localizes the pain to the  left globally  The pain is worse with movement and relieved by rest   The pain is not associated with numbness and tingling  The pain is not associated with constitutional symptoms  The patient is not awoken at night by the pain        Left shoulder labral repair 12 year ago by Dr Annetta Burrows followed by an arthroscopic debridement by a physician at [place] [id number] 7 yrs ago  The following portions of the patient's history were reviewed and updated as appropriate: allergies, current medications, past family history, past medical history, past social history, past surgical history and problem list     Review of Systems   Constitutional: Negative for chills and fever  HENT: Negative for drooling and hearing loss  Eyes: Negative for visual disturbance  Respiratory: Negative for cough and shortness of breath  Cardiovascular: Negative for chest pain  Gastrointestinal: Negative for abdominal pain  Skin: Negative for rash  Psychiatric/Behavioral: Negative for agitation  Objective:  /83 (BP Location: Right arm, Patient Position: Sitting, Cuff Size: Adult)   Pulse 67   Ht 5' 6" (1 676 m)   Wt 91 2 kg (201 lb)   BMI 32 44 kg/m²       Right Shoulder Exam     Range of Motion   External rotation: 80   Forward flexion: 170   Internal rotation 0 degrees: Lumbar     Muscle Strength   Abduction: 5/5   External rotation: 5/5     Tests   Griffith test: positive    Other   Erythema: absent  Sensation: normal  Pulse: present      Left Shoulder Exam     Range of Motion   External rotation: 70   Forward flexion: 160   Internal rotation 0 degrees: Lumbar     Muscle Strength   Abduction: 5/5   External rotation: 5/5     Other   Erythema: absent  Sensation: normal  Pulse: present             Physical Exam  Vitals reviewed  Constitutional:       Appearance: He is well-developed  HENT:      Head: Normocephalic  Eyes:      Pupils: Pupils are equal, round, and reactive to light  Pulmonary:      Effort: Pulmonary effort is normal    Skin:     General: Skin is warm and dry  I have personally reviewed pertinent films in PACS and my interpretation is as follows  Left shoulder x-rays demonstrates severe degenerative changes with inferior osteophyte    Right shoulder x-rays no fracture or dislocation mild degenerative changes       Scribe Attestation    I,:  Quinn Joseph am acting as a scribe while in the presence of the attending physician :       I,:  Arleta Ormond, MD personally performed the services described in this documentation    as scribed in my presence :

## 2022-06-16 NOTE — LETTER
June 16, 2022     Patient: Dylan Milian  YOB: 1980  Date of Visit: 6/16/2022      To Whom it May Concern:    Dylan Milian is under my professional care  Edward Lucero was seen in my office on 6/16/2022  Edward Lucero may return to work  If you have any questions or concerns, please don't hesitate to call           Sincerely,          Ezequiel Sky MD        CC: No Recipients

## 2022-08-02 ENCOUNTER — TELEPHONE (OUTPATIENT)
Dept: FAMILY MEDICINE CLINIC | Facility: CLINIC | Age: 42
End: 2022-08-02

## 2022-08-02 NOTE — TELEPHONE ENCOUNTER
Call patient  Patient could come in for appointment for shot of steroids    Patient could use Sarna lotion OTC for which as well as hydrocortisone cream

## 2022-08-02 NOTE — TELEPHONE ENCOUNTER
Per patient would like to know if you can call in  or recommend cream for a poison oak rash on his arms  Patient states he is going away on vacation on sat  Should we have the patient come on for a visit

## 2022-08-04 ENCOUNTER — OFFICE VISIT (OUTPATIENT)
Dept: FAMILY MEDICINE CLINIC | Facility: CLINIC | Age: 42
End: 2022-08-04
Payer: COMMERCIAL

## 2022-08-04 VITALS
WEIGHT: 200 LBS | HEIGHT: 66 IN | TEMPERATURE: 96.5 F | HEART RATE: 65 BPM | DIASTOLIC BLOOD PRESSURE: 70 MMHG | BODY MASS INDEX: 32.14 KG/M2 | SYSTOLIC BLOOD PRESSURE: 118 MMHG | RESPIRATION RATE: 16 BRPM | OXYGEN SATURATION: 96 %

## 2022-08-04 DIAGNOSIS — L23.7 ALLERGIC CONTACT DERMATITIS DUE TO PLANTS, EXCEPT FOOD: ICD-10-CM

## 2022-08-04 DIAGNOSIS — Z00.00 WELL ADULT EXAM: Primary | ICD-10-CM

## 2022-08-04 PROCEDURE — 96372 THER/PROPH/DIAG INJ SC/IM: CPT | Performed by: FAMILY MEDICINE

## 2022-08-04 PROCEDURE — 3725F SCREEN DEPRESSION PERFORMED: CPT | Performed by: FAMILY MEDICINE

## 2022-08-04 PROCEDURE — 99396 PREV VISIT EST AGE 40-64: CPT | Performed by: FAMILY MEDICINE

## 2022-08-04 RX ORDER — METHYLPREDNISOLONE ACETATE 40 MG/ML
40 INJECTION, SUSPENSION INTRA-ARTICULAR; INTRALESIONAL; INTRAMUSCULAR; SOFT TISSUE ONCE
Status: COMPLETED | OUTPATIENT
Start: 2022-08-04 | End: 2022-08-04

## 2022-08-04 RX ADMIN — METHYLPREDNISOLONE ACETATE 40 MG: 40 INJECTION, SUSPENSION INTRA-ARTICULAR; INTRALESIONAL; INTRAMUSCULAR; SOFT TISSUE at 15:53

## 2022-08-04 NOTE — PROGRESS NOTES
Assessment/Plan: patient vaccines up-to-date  Patient go for laboratory studies  Patient have PSA last year  Patient have colonoscopy next year at age 37  Patient will receive Depo-Medrol 40 mg IM at this time  Patient use Benadryl as needed for poison       Diagnoses and all orders for this visit:    Well adult exam  -     CBC and differential; Future  -     Comprehensive metabolic panel; Future  -     Lipid panel; Future  -     TSH, 3rd generation with Free T4 reflex; Future            Subjective:        Patient ID: Nick Mckinnon is a 38 y o  male  Patient is here for wellness exam   Labs and vaccines reviewed  Patient did by dog last year and had tetanus shot  Patient had normal PSA done last year  No family history of colorectal cancer  Patient using calamine lotion for poison  The following portions of the patient's history were reviewed and updated as appropriate: allergies, current medications, past family history, past medical history, past social history, past surgical history and problem list       Review of Systems   Constitutional: Negative  HENT: Negative  Eyes: Negative  Respiratory: Negative  Cardiovascular: Negative  Gastrointestinal: Negative  Endocrine: Negative  Genitourinary: Negative  Musculoskeletal: Negative  Skin: Positive for rash  Allergic/Immunologic: Negative  Neurological: Negative  Hematological: Negative  Psychiatric/Behavioral: Negative  Objective:        Depression Screening and Follow-up Plan: Patient was screened for depression during today's encounter  They screened negative with a PHQ-2 score of 0             /70 (BP Location: Right arm, Patient Position: Sitting, Cuff Size: Adult)   Pulse 65   Temp (!) 96 5 °F (35 8 °C) (Tympanic)   Resp 16   Ht 5' 6" (1 676 m)   Wt 90 7 kg (200 lb)   SpO2 96%   BMI 32 28 kg/m²          Physical Exam  Vitals and nursing note reviewed     Constitutional: General: He is not in acute distress  Appearance: Normal appearance  He is not ill-appearing, toxic-appearing or diaphoretic  HENT:      Head: Normocephalic and atraumatic  Right Ear: Tympanic membrane, ear canal and external ear normal  There is no impacted cerumen  Left Ear: Tympanic membrane, ear canal and external ear normal  There is no impacted cerumen  Nose: Nose normal  No congestion or rhinorrhea  Mouth/Throat:      Mouth: Mucous membranes are moist       Pharynx: No oropharyngeal exudate or posterior oropharyngeal erythema  Eyes:      General: No scleral icterus  Right eye: No discharge  Left eye: No discharge  Extraocular Movements: Extraocular movements intact  Conjunctiva/sclera: Conjunctivae normal       Pupils: Pupils are equal, round, and reactive to light  Neck:      Vascular: No carotid bruit  Cardiovascular:      Rate and Rhythm: Normal rate and regular rhythm  Pulses: Normal pulses  Heart sounds: Normal heart sounds  No murmur heard  No friction rub  No gallop  Pulmonary:      Effort: Pulmonary effort is normal  No respiratory distress  Breath sounds: Normal breath sounds  No stridor  No wheezing, rhonchi or rales  Chest:      Chest wall: No tenderness  Abdominal:      General: Abdomen is flat  Bowel sounds are normal  There is no distension  Palpations: Abdomen is soft  Tenderness: There is no abdominal tenderness  There is no guarding or rebound  Musculoskeletal:         General: No swelling, tenderness, deformity or signs of injury  Normal range of motion  Cervical back: Normal range of motion and neck supple  No rigidity  No muscular tenderness  Right lower leg: No edema  Left lower leg: No edema  Lymphadenopathy:      Cervical: No cervical adenopathy  Skin:     General: Skin is warm and dry  Capillary Refill: Capillary refill takes less than 2 seconds        Coloration: Skin is not jaundiced  Findings: Rash present  No bruising, erythema or lesion  Comments: Poison on extremities and trunk   Neurological:      General: No focal deficit present  Mental Status: He is alert and oriented to person, place, and time  Cranial Nerves: No cranial nerve deficit  Sensory: No sensory deficit  Motor: No weakness  Coordination: Coordination normal       Gait: Gait normal    Psychiatric:         Mood and Affect: Mood normal          Behavior: Behavior normal          Thought Content:  Thought content normal          Judgment: Judgment normal

## 2022-10-12 PROBLEM — Z12.5 PROSTATE CANCER SCREENING: Status: RESOLVED | Noted: 2021-10-13 | Resolved: 2022-10-12

## 2022-11-01 ENCOUNTER — TELEPHONE (OUTPATIENT)
Dept: FAMILY MEDICINE CLINIC | Facility: CLINIC | Age: 42
End: 2022-11-01

## 2022-11-01 DIAGNOSIS — R09.81 SINUS CONGESTION: Primary | ICD-10-CM

## 2022-11-01 RX ORDER — AZITHROMYCIN 250 MG/1
TABLET, FILM COATED ORAL
Qty: 6 TABLET | Refills: 0 | Status: SHIPPED | OUTPATIENT
Start: 2022-11-01 | End: 2022-11-06

## 2022-11-01 NOTE — TELEPHONE ENCOUNTER
Patient is complaining of a sinus infection and would like a Zpak prescribed  Appointment was offered and patient stated he could not miss work  Patient does have an appointment scheduled 11/16/22 for follow up

## 2022-11-01 NOTE — TELEPHONE ENCOUNTER
Call patient    Will prescribe Z-Nick this time but patient will need appointment routinely in future

## 2022-11-16 ENCOUNTER — OFFICE VISIT (OUTPATIENT)
Dept: FAMILY MEDICINE CLINIC | Facility: CLINIC | Age: 42
End: 2022-11-16

## 2022-11-16 VITALS
HEIGHT: 66 IN | OXYGEN SATURATION: 96 % | HEART RATE: 87 BPM | WEIGHT: 206.8 LBS | SYSTOLIC BLOOD PRESSURE: 120 MMHG | BODY MASS INDEX: 33.23 KG/M2 | DIASTOLIC BLOOD PRESSURE: 74 MMHG | TEMPERATURE: 98.6 F

## 2022-11-16 DIAGNOSIS — R79.89 LOW TSH LEVEL: ICD-10-CM

## 2022-11-16 DIAGNOSIS — G89.29 CHRONIC PAIN OF RIGHT KNEE: Primary | ICD-10-CM

## 2022-11-16 DIAGNOSIS — R53.82 CHRONIC FATIGUE: ICD-10-CM

## 2022-11-16 DIAGNOSIS — M25.561 CHRONIC PAIN OF RIGHT KNEE: Primary | ICD-10-CM

## 2022-11-16 DIAGNOSIS — Z00.00 WELL ADULT EXAM: ICD-10-CM

## 2022-11-16 NOTE — PROGRESS NOTES
Assessment/Plan:  Labs up-to-date  Patient will have laboratory studies done  Vaccines reviewed  Tetanus shot up-to-date done 2021  No family history of prostate or colon cancer  Patient go for sleep study given chronic fatigue  Patient will have labs in this regard  Patient will have TSH redrawn for low TSH level  Patient see Orthopedics regarding chronic right knee pain with history of ACL repair  The follow-up in 6 months       Diagnoses and all orders for this visit:    Chronic pain of right knee  -     Ambulatory Referral to Orthopedic Surgery; Future    Well adult exam  -     Lyme Antibody Profile with reflex to WB; Future  -     TSH, 3rd generation with Free T4 reflex; Future  -     CBC and differential; Future  -     Comprehensive metabolic panel; Future  -     MANISH Screen w/ Reflex to Titer/Pattern; Future  -     Testosterone, free, total; Future  -     Ferritin; Future  -     Cortisol Level, AM Specimen; Future  -     C-reactive protein; Future  -     Lipid panel; Future    Low TSH level  -     Lyme Antibody Profile with reflex to WB; Future  -     TSH, 3rd generation with Free T4 reflex; Future  -     CBC and differential; Future  -     Comprehensive metabolic panel; Future  -     MANISH Screen w/ Reflex to Titer/Pattern; Future  -     Testosterone, free, total; Future  -     Ferritin; Future  -     Cortisol Level, AM Specimen; Future  -     C-reactive protein; Future  -     Lipid panel; Future    Chronic fatigue  -     Lyme Antibody Profile with reflex to WB; Future  -     TSH, 3rd generation with Free T4 reflex; Future  -     CBC and differential; Future  -     Comprehensive metabolic panel; Future  -     MANISH Screen w/ Reflex to Titer/Pattern; Future  -     Testosterone, free, total; Future  -     Ferritin; Future  -     Cortisol Level, AM Specimen; Future  -     C-reactive protein; Future  -     Lipid panel; Future  -     Home Study;  Future            Subjective:        Patient ID: Rylee Laguna is a 38 y o  male  Patient is here for wellness exam   Labs and vaccines reviewed  No family history of prostate or colon cancer  Patient with significant fatigue and tiredness  Patient with right knee pain           The following portions of the patient's history were reviewed and updated as appropriate: allergies, current medications, past family history, past medical history, past social history, past surgical history and problem list       Review of Systems   Constitutional: Negative  HENT: Negative  Eyes: Negative  Respiratory: Negative  Cardiovascular: Negative  Gastrointestinal: Negative  Endocrine: Negative  Genitourinary: Negative  Musculoskeletal: Positive for arthralgias  Skin: Negative  Allergic/Immunologic: Negative  Neurological: Negative  Hematological: Negative  Psychiatric/Behavioral: Negative  Objective:               /74 (BP Location: Right arm, Patient Position: Sitting, Cuff Size: Large)   Pulse 87   Temp 98 6 °F (37 °C) (Temporal)   Ht 5' 6" (1 676 m)   Wt 93 8 kg (206 lb 12 8 oz)   SpO2 96%   BMI 33 38 kg/m²          Physical Exam  Vitals and nursing note reviewed  Constitutional:       General: He is not in acute distress  Appearance: Normal appearance  He is not ill-appearing, toxic-appearing or diaphoretic  HENT:      Head: Normocephalic and atraumatic  Right Ear: Tympanic membrane, ear canal and external ear normal  There is no impacted cerumen  Left Ear: Tympanic membrane, ear canal and external ear normal  There is no impacted cerumen  Nose: Nose normal  No congestion or rhinorrhea  Mouth/Throat:      Mouth: Mucous membranes are moist       Pharynx: No oropharyngeal exudate or posterior oropharyngeal erythema  Eyes:      General: No scleral icterus  Right eye: No discharge  Left eye: No discharge  Extraocular Movements: Extraocular movements intact        Conjunctiva/sclera: Conjunctivae normal       Pupils: Pupils are equal, round, and reactive to light  Neck:      Vascular: No carotid bruit  Cardiovascular:      Rate and Rhythm: Normal rate and regular rhythm  Pulses: Normal pulses  Heart sounds: Normal heart sounds  No murmur heard  No friction rub  No gallop  Pulmonary:      Effort: Pulmonary effort is normal  No respiratory distress  Breath sounds: Normal breath sounds  No stridor  No wheezing, rhonchi or rales  Chest:      Chest wall: No tenderness  Musculoskeletal:         General: Tenderness present  No swelling, deformity or signs of injury  Cervical back: Normal range of motion and neck supple  No rigidity  No muscular tenderness  Right lower leg: No edema  Left lower leg: No edema  Lymphadenopathy:      Cervical: No cervical adenopathy  Skin:     General: Skin is warm and dry  Capillary Refill: Capillary refill takes less than 2 seconds  Coloration: Skin is not jaundiced  Findings: No bruising, erythema, lesion or rash  Neurological:      Mental Status: He is alert and oriented to person, place, and time  Mental status is at baseline  Cranial Nerves: No cranial nerve deficit  Sensory: No sensory deficit  Motor: No weakness  Coordination: Coordination normal       Gait: Gait normal    Psychiatric:         Mood and Affect: Mood normal          Behavior: Behavior normal          Thought Content:  Thought content normal          Judgment: Judgment normal

## 2022-11-18 ENCOUNTER — APPOINTMENT (OUTPATIENT)
Dept: LAB | Age: 42
End: 2022-11-18

## 2022-11-18 DIAGNOSIS — R79.89 LOW TSH LEVEL: ICD-10-CM

## 2022-11-18 DIAGNOSIS — Z00.00 WELL ADULT EXAM: ICD-10-CM

## 2022-11-18 DIAGNOSIS — R53.82 CHRONIC FATIGUE: ICD-10-CM

## 2022-11-18 LAB
ALBUMIN SERPL BCP-MCNC: 3.7 G/DL (ref 3.5–5)
ALP SERPL-CCNC: 70 U/L (ref 46–116)
ALT SERPL W P-5'-P-CCNC: 48 U/L (ref 12–78)
ANA SER QL IA: NEGATIVE
ANION GAP SERPL CALCULATED.3IONS-SCNC: 4 MMOL/L (ref 4–13)
AST SERPL W P-5'-P-CCNC: 35 U/L (ref 5–45)
B BURGDOR IGG+IGM SER-ACNC: <0.2 AI
BASOPHILS # BLD AUTO: 0.06 THOUSANDS/ÂΜL (ref 0–0.1)
BASOPHILS NFR BLD AUTO: 1 % (ref 0–1)
BILIRUB SERPL-MCNC: 0.88 MG/DL (ref 0.2–1)
BUN SERPL-MCNC: 21 MG/DL (ref 5–25)
CALCIUM SERPL-MCNC: 9.4 MG/DL (ref 8.3–10.1)
CHLORIDE SERPL-SCNC: 106 MMOL/L (ref 96–108)
CHOLEST SERPL-MCNC: 223 MG/DL
CO2 SERPL-SCNC: 28 MMOL/L (ref 21–32)
CORTIS AM PEAK SERPL-MCNC: 11.2 UG/DL (ref 4.2–22.4)
CREAT SERPL-MCNC: 1.56 MG/DL (ref 0.6–1.3)
CRP SERPL QL: 4.9 MG/L
EOSINOPHIL # BLD AUTO: 0.59 THOUSAND/ÂΜL (ref 0–0.61)
EOSINOPHIL NFR BLD AUTO: 8 % (ref 0–6)
ERYTHROCYTE [DISTWIDTH] IN BLOOD BY AUTOMATED COUNT: 12.4 % (ref 11.6–15.1)
FERRITIN SERPL-MCNC: 191 NG/ML (ref 8–388)
GFR SERPL CREATININE-BSD FRML MDRD: 53 ML/MIN/1.73SQ M
GLUCOSE P FAST SERPL-MCNC: 105 MG/DL (ref 65–99)
HCT VFR BLD AUTO: 53.3 % (ref 36.5–49.3)
HDLC SERPL-MCNC: 43 MG/DL
HGB BLD-MCNC: 17.6 G/DL (ref 12–17)
IMM GRANULOCYTES # BLD AUTO: 0.09 THOUSAND/UL (ref 0–0.2)
IMM GRANULOCYTES NFR BLD AUTO: 1 % (ref 0–2)
LDLC SERPL CALC-MCNC: 160 MG/DL (ref 0–100)
LYMPHOCYTES # BLD AUTO: 1.85 THOUSANDS/ÂΜL (ref 0.6–4.47)
LYMPHOCYTES NFR BLD AUTO: 25 % (ref 14–44)
MCH RBC QN AUTO: 31.8 PG (ref 26.8–34.3)
MCHC RBC AUTO-ENTMCNC: 33 G/DL (ref 31.4–37.4)
MCV RBC AUTO: 96 FL (ref 82–98)
MONOCYTES # BLD AUTO: 0.62 THOUSAND/ÂΜL (ref 0.17–1.22)
MONOCYTES NFR BLD AUTO: 8 % (ref 4–12)
NEUTROPHILS # BLD AUTO: 4.29 THOUSANDS/ÂΜL (ref 1.85–7.62)
NEUTS SEG NFR BLD AUTO: 57 % (ref 43–75)
NONHDLC SERPL-MCNC: 180 MG/DL
NRBC BLD AUTO-RTO: 0 /100 WBCS
PLATELET # BLD AUTO: 199 THOUSANDS/UL (ref 149–390)
PMV BLD AUTO: 12 FL (ref 8.9–12.7)
POTASSIUM SERPL-SCNC: 4.8 MMOL/L (ref 3.5–5.3)
PROT SERPL-MCNC: 7.7 G/DL (ref 6.4–8.4)
RBC # BLD AUTO: 5.53 MILLION/UL (ref 3.88–5.62)
SODIUM SERPL-SCNC: 138 MMOL/L (ref 135–147)
TRIGL SERPL-MCNC: 100 MG/DL
TSH SERPL DL<=0.05 MIU/L-ACNC: 1.54 UIU/ML (ref 0.45–4.5)
WBC # BLD AUTO: 7.5 THOUSAND/UL (ref 4.31–10.16)

## 2022-11-19 LAB
TESTOST FREE SERPL-MCNC: 44.9 PG/ML (ref 6.8–21.5)
TESTOST SERPL-MCNC: >1500 NG/DL (ref 264–916)

## 2022-11-22 ENCOUNTER — TELEPHONE (OUTPATIENT)
Dept: FAMILY MEDICINE CLINIC | Facility: CLINIC | Age: 42
End: 2022-11-22

## 2022-11-22 NOTE — TELEPHONE ENCOUNTER
Patient called for blood work results which were given  Patient had a few more questions in regards to the labs  He was also questioning results for thyroid   Please assist     Thank you

## 2022-11-22 NOTE — TELEPHONE ENCOUNTER
----- Message from Xavi Garibay DO sent at 11/20/2022 10:14 AM EST -----  Call patient  Testosterone elevated greater than 1500  Cholesterol 223  Other labs relatively stable other than creatinine 1 5 and glucose 105  Recommend continuing low-fat low-cholesterol diet with decreased sugar intake    Would recommend repeating CMP to re-evaluate kidney function in 2-3 months well hydrated and off supplements at that time

## 2023-05-09 ENCOUNTER — TELEPHONE (OUTPATIENT)
Dept: FAMILY MEDICINE CLINIC | Facility: CLINIC | Age: 43
End: 2023-05-09

## 2023-05-09 ENCOUNTER — OFFICE VISIT (OUTPATIENT)
Dept: FAMILY MEDICINE CLINIC | Facility: CLINIC | Age: 43
End: 2023-05-09

## 2023-05-09 VITALS
HEART RATE: 80 BPM | SYSTOLIC BLOOD PRESSURE: 120 MMHG | TEMPERATURE: 99.1 F | WEIGHT: 216.2 LBS | DIASTOLIC BLOOD PRESSURE: 80 MMHG | BODY MASS INDEX: 34.75 KG/M2 | RESPIRATION RATE: 95 BRPM | HEIGHT: 66 IN

## 2023-05-09 DIAGNOSIS — R22.31 MASS OF RIGHT UPPER EXTREMITY: Primary | ICD-10-CM

## 2023-05-09 DIAGNOSIS — Z12.11 SCREENING FOR COLON CANCER: ICD-10-CM

## 2023-05-09 NOTE — TELEPHONE ENCOUNTER
FYI: Patient called stating he spoke with the insurance company   Per patient, the ozempic would be covered - with authorization - with minimal cost      (reminder: ozempic only approved for DM  wegovy is approved for weight loss)

## 2023-05-09 NOTE — PROGRESS NOTES
"Assessment/Plan:       Diagnoses and all orders for this visit:    Mass of right upper extremity  -     US msk guidance; Future    Screening for colon cancer  -     Ambulatory referral for colonoscopy; Future            Subjective:        Patient ID: Hanna Zapata is a 37 y o  male  Patient is here with lump right over the past few days  No discharge or redness  Some pain noted  The following portions of the patient's history were reviewed and updated as appropriate: allergies, current medications, past family history, past medical history, past social history, past surgical history and problem list       Review of Systems   Constitutional: Negative  HENT: Negative  Eyes: Negative  Respiratory: Negative  Cardiovascular: Negative  Gastrointestinal: Negative  Endocrine: Negative  Genitourinary: Negative  Musculoskeletal: Positive for back pain and neck pain  Skin: Positive for color change  Allergic/Immunologic: Negative  Neurological: Negative  Hematological: Negative  Psychiatric/Behavioral: Negative  Objective:      BMI Counseling: Body mass index is 34 9 kg/m²  The BMI is above normal  Nutrition recommendations include consuming healthier snacks  Exercise recommendations include moderate physical activity 150 minutes/week  Rationale for BMI follow-up plan is due to patient being overweight or obese  Depression Screening and Follow-up Plan: Clincally patient does not have depression  No treatment is required  /88 (BP Location: Right arm, Patient Position: Sitting, Cuff Size: Large)   Pulse 80   Temp 99 1 °F (37 3 °C) (Temporal)   Resp (!) 95   Ht 5' 6\" (1 676 m)   Wt 98 1 kg (216 lb 3 2 oz)   BMI 34 90 kg/m²          Physical Exam  Vitals and nursing note reviewed  Constitutional:       General: He is not in acute distress  Appearance: Normal appearance  He is not ill-appearing, toxic-appearing or diaphoretic     HENT:     " Head: Normocephalic and atraumatic  Right Ear: Tympanic membrane, ear canal and external ear normal  There is no impacted cerumen  Left Ear: Tympanic membrane, ear canal and external ear normal  There is no impacted cerumen  Nose: Nose normal  No congestion or rhinorrhea  Mouth/Throat:      Mouth: Mucous membranes are moist       Pharynx: No oropharyngeal exudate or posterior oropharyngeal erythema  Eyes:      General: No scleral icterus  Right eye: No discharge  Left eye: No discharge  Extraocular Movements: Extraocular movements intact  Conjunctiva/sclera: Conjunctivae normal       Pupils: Pupils are equal, round, and reactive to light  Neck:      Vascular: No carotid bruit  Cardiovascular:      Rate and Rhythm: Normal rate and regular rhythm  Pulses: Normal pulses  Heart sounds: Normal heart sounds  No murmur heard  No friction rub  No gallop  Pulmonary:      Effort: Pulmonary effort is normal  No respiratory distress  Breath sounds: Normal breath sounds  No stridor  No wheezing, rhonchi or rales  Chest:      Chest wall: No tenderness  Musculoskeletal:         General: No swelling, tenderness, deformity or signs of injury  Cervical back: Normal range of motion and neck supple  No rigidity  No muscular tenderness  Right lower leg: No edema  Left lower leg: No edema  Comments: Lump over the deltoid/biceps region on the right  Lymphadenopathy:      Cervical: No cervical adenopathy  Skin:     General: Skin is warm and dry  Capillary Refill: Capillary refill takes less than 2 seconds  Coloration: Skin is not jaundiced  Findings: No bruising, erythema, lesion or rash  Neurological:      Mental Status: He is alert and oriented to person, place, and time  Mental status is at baseline  Cranial Nerves: No cranial nerve deficit  Sensory: No sensory deficit  Motor: No weakness  Coordination: Coordination normal       Gait: Gait normal    Psychiatric:         Mood and Affect: Mood normal          Behavior: Behavior normal          Thought Content:  Thought content normal          Judgment: Judgment normal

## 2023-05-09 NOTE — TELEPHONE ENCOUNTER
See above note from [facility] regarding [address]  [address] [facility] only [facility]

## 2023-05-10 DIAGNOSIS — E66.9 OBESITY (BMI 30-39.9): Primary | ICD-10-CM

## 2023-05-14 ENCOUNTER — APPOINTMENT (EMERGENCY)
Dept: RADIOLOGY | Facility: HOSPITAL | Age: 43
End: 2023-05-14

## 2023-05-14 ENCOUNTER — HOSPITAL ENCOUNTER (OUTPATIENT)
Facility: HOSPITAL | Age: 43
Setting detail: OUTPATIENT SURGERY
Discharge: HOME | End: 2023-05-15
Attending: EMERGENCY MEDICINE | Admitting: INTERNAL MEDICINE

## 2023-05-14 DIAGNOSIS — N20.0 NEPHROLITHIASIS: Primary | ICD-10-CM

## 2023-05-14 DIAGNOSIS — M62.82 RHABDOMYOLYSIS: ICD-10-CM

## 2023-05-14 DIAGNOSIS — R79.89 ELEVATED SERUM CREATININE: ICD-10-CM

## 2023-05-14 DIAGNOSIS — N13.30 HYDRONEPHROSIS: ICD-10-CM

## 2023-05-14 LAB
ALBUMIN SERPL BCP-MCNC: 3.5 G/DL (ref 3.5–5)
ALP SERPL-CCNC: 60 U/L (ref 46–116)
ALT SERPL W P-5'-P-CCNC: 47 U/L (ref 12–78)
ANION GAP SERPL CALCULATED.3IONS-SCNC: -1 MMOL/L (ref 4–13)
AST SERPL W P-5'-P-CCNC: 59 U/L (ref 5–45)
BACTERIA UR QL AUTO: ABNORMAL /HPF
BASOPHILS # BLD AUTO: 0.06 THOUSANDS/ÂΜL (ref 0–0.1)
BASOPHILS NFR BLD AUTO: 1 % (ref 0–1)
BILIRUB SERPL-MCNC: 0.31 MG/DL (ref 0.2–1)
BILIRUB UR QL STRIP: NEGATIVE
BUN SERPL-MCNC: 12 MG/DL (ref 5–25)
CALCIUM SERPL-MCNC: 9.1 MG/DL (ref 8.3–10.1)
CHLORIDE SERPL-SCNC: 106 MMOL/L (ref 96–108)
CK SERPL-CCNC: 1331 U/L (ref 39–308)
CLARITY UR: CLEAR
CO2 SERPL-SCNC: 31 MMOL/L (ref 21–32)
COLOR UR: YELLOW
CREAT SERPL-MCNC: 1.55 MG/DL (ref 0.6–1.3)
EOSINOPHIL # BLD AUTO: 0.29 THOUSAND/ÂΜL (ref 0–0.61)
EOSINOPHIL NFR BLD AUTO: 3 % (ref 0–6)
ERYTHROCYTE [DISTWIDTH] IN BLOOD BY AUTOMATED COUNT: 12.2 % (ref 11.6–15.1)
GFR SERPL CREATININE-BSD FRML MDRD: 53 ML/MIN/1.73SQ M
GLUCOSE SERPL-MCNC: 88 MG/DL (ref 65–140)
GLUCOSE UR STRIP-MCNC: NEGATIVE MG/DL
HCT VFR BLD AUTO: 47.7 % (ref 36.5–49.3)
HGB BLD-MCNC: 15.9 G/DL (ref 12–17)
HGB UR QL STRIP.AUTO: ABNORMAL
IMM GRANULOCYTES # BLD AUTO: 0.11 THOUSAND/UL (ref 0–0.2)
IMM GRANULOCYTES NFR BLD AUTO: 1 % (ref 0–2)
KETONES UR STRIP-MCNC: NEGATIVE MG/DL
LEUKOCYTE ESTERASE UR QL STRIP: ABNORMAL
LIPASE SERPL-CCNC: 100 U/L (ref 73–393)
LYMPHOCYTES # BLD AUTO: 2.34 THOUSANDS/ÂΜL (ref 0.6–4.47)
LYMPHOCYTES NFR BLD AUTO: 20 % (ref 14–44)
MCH RBC QN AUTO: 32.4 PG (ref 26.8–34.3)
MCHC RBC AUTO-ENTMCNC: 33.3 G/DL (ref 31.4–37.4)
MCV RBC AUTO: 97 FL (ref 82–98)
MONOCYTES # BLD AUTO: 0.95 THOUSAND/ÂΜL (ref 0.17–1.22)
MONOCYTES NFR BLD AUTO: 8 % (ref 4–12)
MUCOUS THREADS UR QL AUTO: ABNORMAL
NEUTROPHILS # BLD AUTO: 8.01 THOUSANDS/ÂΜL (ref 1.85–7.62)
NEUTS SEG NFR BLD AUTO: 67 % (ref 43–75)
NITRITE UR QL STRIP: NEGATIVE
NON-SQ EPI CELLS URNS QL MICRO: ABNORMAL /HPF
NRBC BLD AUTO-RTO: 0 /100 WBCS
PH UR STRIP.AUTO: 5.5 [PH]
PLATELET # BLD AUTO: 200 THOUSANDS/UL (ref 149–390)
PMV BLD AUTO: 12 FL (ref 8.9–12.7)
POTASSIUM SERPL-SCNC: 4.2 MMOL/L (ref 3.5–5.3)
PROT SERPL-MCNC: 7.2 G/DL (ref 6.4–8.4)
PROT UR STRIP-MCNC: ABNORMAL MG/DL
RBC # BLD AUTO: 4.91 MILLION/UL (ref 3.88–5.62)
RBC #/AREA URNS AUTO: ABNORMAL /HPF
SODIUM SERPL-SCNC: 136 MMOL/L (ref 135–147)
SP GR UR STRIP.AUTO: 1.02 (ref 1–1.03)
UROBILINOGEN UR STRIP-ACNC: <2 MG/DL
WBC # BLD AUTO: 11.76 THOUSAND/UL (ref 4.31–10.16)
WBC #/AREA URNS AUTO: ABNORMAL /HPF

## 2023-05-14 RX ORDER — ACETAMINOPHEN 325 MG/1
650 TABLET ORAL ONCE
Status: COMPLETED | OUTPATIENT
Start: 2023-05-14 | End: 2023-05-14

## 2023-05-14 RX ORDER — HYDROMORPHONE HCL/PF 1 MG/ML
0.5 SYRINGE (ML) INJECTION ONCE
Status: COMPLETED | OUTPATIENT
Start: 2023-05-14 | End: 2023-05-14

## 2023-05-14 RX ORDER — IBUPROFEN 400 MG/1
400 TABLET ORAL ONCE
Status: DISCONTINUED | OUTPATIENT
Start: 2023-05-14 | End: 2023-05-14

## 2023-05-14 RX ORDER — HYDROMORPHONE HCL/PF 1 MG/ML
1 SYRINGE (ML) INJECTION ONCE
Status: COMPLETED | OUTPATIENT
Start: 2023-05-14 | End: 2023-05-14

## 2023-05-14 RX ORDER — ONDANSETRON 2 MG/ML
4 INJECTION INTRAMUSCULAR; INTRAVENOUS ONCE
Status: COMPLETED | OUTPATIENT
Start: 2023-05-14 | End: 2023-05-14

## 2023-05-14 RX ORDER — LIDOCAINE 50 MG/G
1 PATCH TOPICAL ONCE
Status: COMPLETED | OUTPATIENT
Start: 2023-05-14 | End: 2023-05-15

## 2023-05-14 RX ADMIN — HYDROMORPHONE HYDROCHLORIDE 1 MG: 1 INJECTION, SOLUTION INTRAMUSCULAR; INTRAVENOUS; SUBCUTANEOUS at 21:00

## 2023-05-14 RX ADMIN — LIDOCAINE 5% 1 PATCH: 700 PATCH TOPICAL at 21:04

## 2023-05-14 RX ADMIN — HYDROMORPHONE HYDROCHLORIDE 0.5 MG: 1 INJECTION, SOLUTION INTRAMUSCULAR; INTRAVENOUS; SUBCUTANEOUS at 23:55

## 2023-05-14 RX ADMIN — SODIUM CHLORIDE 1000 ML: 0.9 INJECTION, SOLUTION INTRAVENOUS at 21:05

## 2023-05-14 RX ADMIN — ONDANSETRON 4 MG: 2 INJECTION INTRAMUSCULAR; INTRAVENOUS at 21:02

## 2023-05-14 RX ADMIN — ACETAMINOPHEN 650 MG: 325 TABLET ORAL at 21:05

## 2023-05-14 RX ADMIN — HYDROMORPHONE HYDROCHLORIDE 1 MG: 1 INJECTION, SOLUTION INTRAMUSCULAR; INTRAVENOUS; SUBCUTANEOUS at 22:01

## 2023-05-14 NOTE — LETTER
[address] [address]  Dept: [phone]    May 15, 2023     Patient: Dante Rodriguez   YOB: 1980   Date of Visit: 5/14/2023       To Whom it May Concern:    Dante Rodriguez is under my professional care  He was seen in the hospital from 5/14/2023 to 05/15/23  He may return to work on 5/17 without limitations  If you have any questions or concerns, please don't hesitate to call           Sincerely,          Jean Marie Mcwilliams MD

## 2023-05-15 ENCOUNTER — TELEPHONE (OUTPATIENT)
Dept: OTHER | Facility: HOSPITAL | Age: 43
End: 2023-05-15

## 2023-05-15 ENCOUNTER — ANESTHESIA EVENT (OUTPATIENT)
Dept: PERIOP | Facility: HOSPITAL | Age: 43
End: 2023-05-15

## 2023-05-15 ENCOUNTER — APPOINTMENT (OUTPATIENT)
Dept: RADIOLOGY | Facility: HOSPITAL | Age: 43
End: 2023-05-15

## 2023-05-15 ENCOUNTER — ANESTHESIA (OUTPATIENT)
Dept: PERIOP | Facility: HOSPITAL | Age: 43
End: 2023-05-15

## 2023-05-15 VITALS
SYSTOLIC BLOOD PRESSURE: 127 MMHG | RESPIRATION RATE: 18 BRPM | DIASTOLIC BLOOD PRESSURE: 75 MMHG | OXYGEN SATURATION: 97 % | TEMPERATURE: 98 F | HEART RATE: 72 BPM

## 2023-05-15 DIAGNOSIS — N13.2 HYDRONEPHROSIS WITH URINARY OBSTRUCTION DUE TO URETERAL CALCULUS: Primary | ICD-10-CM

## 2023-05-15 PROBLEM — N13.30 HYDRONEPHROSIS: Status: ACTIVE | Noted: 2023-05-15

## 2023-05-15 PROBLEM — R79.89 ELEVATED SERUM CREATININE: Status: ACTIVE | Noted: 2023-05-15

## 2023-05-15 LAB
ALBUMIN SERPL BCP-MCNC: 3.1 G/DL (ref 3.5–5)
ALP SERPL-CCNC: 52 U/L (ref 46–116)
ALT SERPL W P-5'-P-CCNC: 43 U/L (ref 12–78)
ANION GAP SERPL CALCULATED.3IONS-SCNC: -1 MMOL/L (ref 4–13)
APTT PPP: 29 SECONDS (ref 23–37)
AST SERPL W P-5'-P-CCNC: 47 U/L (ref 5–45)
BASOPHILS # BLD AUTO: 0.03 THOUSANDS/ÂΜL (ref 0–0.1)
BASOPHILS NFR BLD AUTO: 0 % (ref 0–1)
BILIRUB SERPL-MCNC: 0.8 MG/DL (ref 0.2–1)
BUN SERPL-MCNC: 11 MG/DL (ref 5–25)
CALCIUM ALBUM COR SERPL-MCNC: 9.2 MG/DL (ref 8.3–10.1)
CALCIUM SERPL-MCNC: 8.5 MG/DL (ref 8.3–10.1)
CHLORIDE SERPL-SCNC: 106 MMOL/L (ref 96–108)
CO2 SERPL-SCNC: 29 MMOL/L (ref 21–32)
CREAT SERPL-MCNC: 1.4 MG/DL (ref 0.6–1.3)
CREAT UR-MCNC: 283 MG/DL
EOSINOPHIL # BLD AUTO: 0.19 THOUSAND/ÂΜL (ref 0–0.61)
EOSINOPHIL NFR BLD AUTO: 2 % (ref 0–6)
ERYTHROCYTE [DISTWIDTH] IN BLOOD BY AUTOMATED COUNT: 12.2 % (ref 11.6–15.1)
GFR SERPL CREATININE-BSD FRML MDRD: 60 ML/MIN/1.73SQ M
GLUCOSE SERPL-MCNC: 105 MG/DL (ref 65–140)
HCT VFR BLD AUTO: 45.4 % (ref 36.5–49.3)
HGB BLD-MCNC: 15.6 G/DL (ref 12–17)
IMM GRANULOCYTES # BLD AUTO: 0.09 THOUSAND/UL (ref 0–0.2)
IMM GRANULOCYTES NFR BLD AUTO: 1 % (ref 0–2)
INR PPP: 0.89 (ref 0.84–1.19)
LYMPHOCYTES # BLD AUTO: 1.79 THOUSANDS/ÂΜL (ref 0.6–4.47)
LYMPHOCYTES NFR BLD AUTO: 15 % (ref 14–44)
MAGNESIUM SERPL-MCNC: 2 MG/DL (ref 1.6–2.6)
MCH RBC QN AUTO: 33.5 PG (ref 26.8–34.3)
MCHC RBC AUTO-ENTMCNC: 34.4 G/DL (ref 31.4–37.4)
MCV RBC AUTO: 97 FL (ref 82–98)
MICROALBUMIN UR-MCNC: 40.6 MG/L (ref 0–20)
MICROALBUMIN/CREAT 24H UR: 14 MG/G CREATININE (ref 0–30)
MONOCYTES # BLD AUTO: 0.92 THOUSAND/ÂΜL (ref 0.17–1.22)
MONOCYTES NFR BLD AUTO: 8 % (ref 4–12)
NEUTROPHILS # BLD AUTO: 8.74 THOUSANDS/ÂΜL (ref 1.85–7.62)
NEUTS SEG NFR BLD AUTO: 74 % (ref 43–75)
NRBC BLD AUTO-RTO: 0 /100 WBCS
PLATELET # BLD AUTO: 180 THOUSANDS/UL (ref 149–390)
PMV BLD AUTO: 11.8 FL (ref 8.9–12.7)
POTASSIUM SERPL-SCNC: 4.4 MMOL/L (ref 3.5–5.3)
PROT SERPL-MCNC: 6.4 G/DL (ref 6.4–8.4)
PROTHROMBIN TIME: 12.3 SECONDS (ref 11.6–14.5)
RBC # BLD AUTO: 4.66 MILLION/UL (ref 3.88–5.62)
SODIUM SERPL-SCNC: 134 MMOL/L (ref 135–147)
WBC # BLD AUTO: 11.76 THOUSAND/UL (ref 4.31–10.16)

## 2023-05-15 DEVICE — INLAY OPTIMA URETERAL STENT W/O GUIDEWIRE
Type: IMPLANTABLE DEVICE | Site: URETER | Status: FUNCTIONAL
Brand: BARD® INLAY OPTIMA® URETERAL STENT

## 2023-05-15 RX ORDER — HYDROMORPHONE HCL/PF 1 MG/ML
0.5 SYRINGE (ML) INJECTION
Status: DISCONTINUED | OUTPATIENT
Start: 2023-05-15 | End: 2023-05-15 | Stop reason: HOSPADM

## 2023-05-15 RX ORDER — PHENAZOPYRIDINE HYDROCHLORIDE 200 MG/1
200 TABLET, FILM COATED ORAL 3 TIMES DAILY PRN
Qty: 10 TABLET | Refills: 0 | Status: SHIPPED | OUTPATIENT
Start: 2023-05-15

## 2023-05-15 RX ORDER — DEXAMETHASONE SODIUM PHOSPHATE 10 MG/ML
INJECTION, SOLUTION INTRAMUSCULAR; INTRAVENOUS AS NEEDED
Status: DISCONTINUED | OUTPATIENT
Start: 2023-05-15 | End: 2023-05-15

## 2023-05-15 RX ORDER — TAMSULOSIN HYDROCHLORIDE 0.4 MG/1
0.4 CAPSULE ORAL
Status: DISCONTINUED | OUTPATIENT
Start: 2023-05-15 | End: 2023-05-15 | Stop reason: HOSPADM

## 2023-05-15 RX ORDER — ONDANSETRON 2 MG/ML
4 INJECTION INTRAMUSCULAR; INTRAVENOUS EVERY 6 HOURS PRN
Status: DISCONTINUED | OUTPATIENT
Start: 2023-05-15 | End: 2023-05-15 | Stop reason: HOSPADM

## 2023-05-15 RX ORDER — METOCLOPRAMIDE HYDROCHLORIDE 5 MG/ML
10 INJECTION INTRAMUSCULAR; INTRAVENOUS ONCE AS NEEDED
Status: DISCONTINUED | OUTPATIENT
Start: 2023-05-15 | End: 2023-05-15 | Stop reason: HOSPADM

## 2023-05-15 RX ORDER — HYDROMORPHONE HCL/PF 1 MG/ML
0.5 SYRINGE (ML) INJECTION EVERY 6 HOURS PRN
Status: DISCONTINUED | OUTPATIENT
Start: 2023-05-15 | End: 2023-05-15 | Stop reason: HOSPADM

## 2023-05-15 RX ORDER — OXYBUTYNIN CHLORIDE 5 MG/1
5 TABLET ORAL 3 TIMES DAILY PRN
Qty: 10 TABLET | Refills: 0 | Status: SHIPPED | OUTPATIENT
Start: 2023-05-15

## 2023-05-15 RX ORDER — CEFAZOLIN SODIUM 2 G/50ML
SOLUTION INTRAVENOUS AS NEEDED
Status: DISCONTINUED | OUTPATIENT
Start: 2023-05-15 | End: 2023-05-15

## 2023-05-15 RX ORDER — ACETAMINOPHEN 325 MG/1
650 TABLET ORAL EVERY 6 HOURS PRN
Status: DISCONTINUED | OUTPATIENT
Start: 2023-05-15 | End: 2023-05-15 | Stop reason: HOSPADM

## 2023-05-15 RX ORDER — MAGNESIUM HYDROXIDE 1200 MG/15ML
LIQUID ORAL AS NEEDED
Status: DISCONTINUED | OUTPATIENT
Start: 2023-05-15 | End: 2023-05-15 | Stop reason: HOSPADM

## 2023-05-15 RX ORDER — PROPOFOL 10 MG/ML
INJECTION, EMULSION INTRAVENOUS AS NEEDED
Status: DISCONTINUED | OUTPATIENT
Start: 2023-05-15 | End: 2023-05-15

## 2023-05-15 RX ORDER — SODIUM CHLORIDE 9 MG/ML
125 INJECTION, SOLUTION INTRAVENOUS CONTINUOUS
Status: DISCONTINUED | OUTPATIENT
Start: 2023-05-15 | End: 2023-05-15 | Stop reason: HOSPADM

## 2023-05-15 RX ORDER — ONDANSETRON 2 MG/ML
INJECTION INTRAMUSCULAR; INTRAVENOUS AS NEEDED
Status: DISCONTINUED | OUTPATIENT
Start: 2023-05-15 | End: 2023-05-15

## 2023-05-15 RX ORDER — OXYCODONE HYDROCHLORIDE 5 MG/1
5 TABLET ORAL EVERY 6 HOURS PRN
Status: DISCONTINUED | OUTPATIENT
Start: 2023-05-15 | End: 2023-05-15 | Stop reason: HOSPADM

## 2023-05-15 RX ORDER — MIDAZOLAM HYDROCHLORIDE 2 MG/2ML
INJECTION, SOLUTION INTRAMUSCULAR; INTRAVENOUS AS NEEDED
Status: DISCONTINUED | OUTPATIENT
Start: 2023-05-15 | End: 2023-05-15

## 2023-05-15 RX ORDER — FENTANYL CITRATE 50 UG/ML
INJECTION, SOLUTION INTRAMUSCULAR; INTRAVENOUS AS NEEDED
Status: DISCONTINUED | OUTPATIENT
Start: 2023-05-15 | End: 2023-05-15

## 2023-05-15 RX ORDER — ONDANSETRON 2 MG/ML
4 INJECTION INTRAMUSCULAR; INTRAVENOUS ONCE AS NEEDED
Status: DISCONTINUED | OUTPATIENT
Start: 2023-05-15 | End: 2023-05-15 | Stop reason: HOSPADM

## 2023-05-15 RX ORDER — TAMSULOSIN HYDROCHLORIDE 0.4 MG/1
0.4 CAPSULE ORAL
Qty: 5 CAPSULE | Refills: 0 | Status: SHIPPED | OUTPATIENT
Start: 2023-05-15 | End: 2023-05-20

## 2023-05-15 RX ADMIN — HYDROMORPHONE HYDROCHLORIDE 0.5 MG: 1 INJECTION, SOLUTION INTRAMUSCULAR; INTRAVENOUS; SUBCUTANEOUS at 06:11

## 2023-05-15 RX ADMIN — PROPOFOL 200 MG: 10 INJECTION, EMULSION INTRAVENOUS at 11:56

## 2023-05-15 RX ADMIN — HYDROMORPHONE HYDROCHLORIDE 0.5 MG: 1 INJECTION, SOLUTION INTRAMUSCULAR; INTRAVENOUS; SUBCUTANEOUS at 01:09

## 2023-05-15 RX ADMIN — OXYCODONE HYDROCHLORIDE 5 MG: 5 TABLET ORAL at 02:16

## 2023-05-15 RX ADMIN — CEFAZOLIN SODIUM 2000 MG: 2 SOLUTION INTRAVENOUS at 11:54

## 2023-05-15 RX ADMIN — ONDANSETRON 4 MG: 2 INJECTION INTRAMUSCULAR; INTRAVENOUS at 12:00

## 2023-05-15 RX ADMIN — FENTANYL CITRATE 50 MCG: 50 INJECTION INTRAMUSCULAR; INTRAVENOUS at 11:56

## 2023-05-15 RX ADMIN — DEXAMETHASONE SODIUM PHOSPHATE 10 MG: 10 INJECTION, SOLUTION INTRAMUSCULAR; INTRAVENOUS at 12:00

## 2023-05-15 RX ADMIN — SODIUM CHLORIDE 125 ML/HR: 0.9 INJECTION, SOLUTION INTRAVENOUS at 02:01

## 2023-05-15 RX ADMIN — TAMSULOSIN HYDROCHLORIDE 0.4 MG: 0.4 CAPSULE ORAL at 01:09

## 2023-05-15 RX ADMIN — MIDAZOLAM 2 MG: 1 INJECTION INTRAMUSCULAR; INTRAVENOUS at 11:54

## 2023-05-15 NOTE — QUICK NOTE
Patient okay for discharge from urological standpoint  Will discharge home with stent on a string for removal in 3 days  This can be done in the office or at home  The office will contact him to schedule follow-up  Plan for ultrasound of kidney and bladder in 6 weeks with AP follow-up  Orders placed in epic      Discharge meds ordered

## 2023-05-15 NOTE — ED PROVIDER NOTES
History  Chief Complaint   Patient presents with   • Flank Pain     PT c/o right flank pain that radiates towards his abdomen starting yesterday morning that feels like someone is stabbing him in the back  PT took motrin with no relief  64-year-old male presents emergency department complaining of left-sided back pain that started yesterday in the morning  He describes it is very severe and feels like somebody stabbing him underneath his ribs  He denies any shortness of breath, chest pain, palpitations  Pain is kind of been moving from his left low back into his left flank  She also noted some dark-colored urine  He states he works out quite frequently and lifts heavy weights  He denies pulling his muscle in the gym the day before  He is concerned that the pain has not improved  The pain Connick comes and goes however is normally 7 out of 10  When it comes he feels like a 10 out of 10  He states it feels like a stabbing sensation  States he has some left-sided abdominal pain as well  He denies any fevers or chills  Prior to Admission Medications   Prescriptions Last Dose Informant Patient Reported? Taking?   semaglutide, 0 25 or 0 5 mg/dose, (Ozempic, 0 25 or 0 5 MG/DOSE,) 2 mg/3 mL injection pen   No No   Sig: Inject 0 25mg once every week      Facility-Administered Medications: None       Past Medical History:   Diagnosis Date   • Epilepsy ([facility] [address] )     and recurrent seizures, one time age 13       Past Surgical History:   Procedure Laterality Date   • ANTERIOR CRUCIATE LIGAMENT REPAIR Right        Family History   Problem Relation Age of Onset   • Other Mother         liver transplant     I have reviewed and agree with the history as documented      E-Cigarette/Vaping   • E-Cigarette Use Never User      E-Cigarette/Vaping Substances   • Nicotine No    • THC No    • CBD No    • Flavoring No    • Other No    • Unknown No      Social History     Tobacco Use   • Smoking status: Never   • Smokeless tobacco: Never   Vaping Use   • Vaping Use: Never used   Substance Use Topics   • Alcohol use: Yes     Comment: socially   • Drug use: Never        Review of Systems   Musculoskeletal: Positive for back pain  All other systems reviewed and are negative  Physical Exam  ED Triage Vitals [05/14/23 1948]   Temperature Pulse Respirations Blood Pressure SpO2   98 5 °F (36 9 °C) 84 18 145/76 98 %      Temp Source Heart Rate Source Patient Position - Orthostatic VS BP Location FiO2 (%)   Oral Monitor Lying Left arm --      Pain Score       10 - Worst Possible Pain             Orthostatic Vital Signs  Vitals:    05/15/23 0601 05/15/23 1225 05/15/23 1230 05/15/23 1245   BP: 136/75 136/62 136/62 127/75   Pulse: 73 79 78 72   Patient Position - Orthostatic VS: Lying          Physical Exam  Vitals and nursing note reviewed  Constitutional:       General: He is not in acute distress  Appearance: He is well-developed  HENT:      Head: Normocephalic and atraumatic  Eyes:      Conjunctiva/sclera: Conjunctivae normal    Cardiovascular:      Rate and Rhythm: Normal rate and regular rhythm  Heart sounds: No murmur heard  Pulmonary:      Effort: Pulmonary effort is normal  No respiratory distress  Breath sounds: Normal breath sounds  Abdominal:      Palpations: Abdomen is soft  Tenderness: There is abdominal tenderness in the left upper quadrant and left lower quadrant  There is no right CVA tenderness or left CVA tenderness  Musculoskeletal:         General: No swelling  Cervical back: Neck supple  Skin:     General: Skin is warm and dry  Capillary Refill: Capillary refill takes less than 2 seconds  Neurological:      Mental Status: He is alert     Psychiatric:         Mood and Affect: Mood normal          ED Medications  Medications   acetaminophen (TYLENOL) tablet 650 mg (has no administration in time range)   oxyCODONE (ROXICODONE) IR tablet 5 mg ( Oral MAR Unhold 5/15/23 1432)   HYDROmorphone (DILAUDID) injection 0 5 mg ( Intravenous MAR Unhold 5/15/23 1432)   tamsulosin (FLOMAX) capsule 0 4 mg ( Oral MAR Unhold 5/15/23 1432)   sodium chloride 0 9 % infusion ( Intravenous Stopped 5/15/23 1226)   ondansetron (ZOFRAN) injection 4 mg ( Intravenous MAR Unhold 5/15/23 1432)   acetaminophen (TYLENOL) tablet 650 mg (650 mg Oral Given 5/14/23 2105)   sodium chloride 0 9 % bolus 1,000 mL (0 mL Intravenous Stopped 5/15/23 0102)   lidocaine (LIDODERM) 5 % patch 1 patch (1 patch Topical Patch Removed 5/15/23 0843)   HYDROmorphone (DILAUDID) injection 1 mg (1 mg Intravenous Given 5/14/23 2100)   ondansetron (ZOFRAN) injection 4 mg (4 mg Intravenous Given 5/14/23 2102)   HYDROmorphone (DILAUDID) injection 1 mg (1 mg Intravenous Given 5/14/23 2201)   HYDROmorphone (DILAUDID) injection 0 5 mg (0 5 mg Intravenous Given 5/14/23 2355)       Diagnostic Studies  Results Reviewed     Procedure Component Value Units Date/Time    Albumin / creatinine urine ratio [[id number]]  (Abnormal) Collected: 05/14/23 2318    Lab Status: Final result Specimen: Urine, Clean Catch Updated: 05/15/23 0636     Creatinine, Ur 283 0 mg/dL      Albumin,U,Random 40 6 mg/L      Albumin Creat Ratio 14 mg/g creatinine     Comprehensive metabolic panel [[id number]]  (Abnormal) Collected: 05/15/23 0503    Lab Status: Final result Specimen: Blood from Arm, Left Updated: 05/15/23 0601     Sodium 134 mmol/L      Potassium 4 4 mmol/L      Chloride 106 mmol/L      CO2 29 mmol/L      ANION GAP -1 mmol/L      BUN 11 mg/dL      Creatinine 1 40 mg/dL      Glucose 105 mg/dL      Calcium 8 5 mg/dL      Corrected Calcium 9 2 mg/dL      AST 47 U/L      ALT 43 U/L      Alkaline Phosphatase 52 U/L      Total Protein 6 4 g/dL      Albumin 3 1 g/dL      Total Bilirubin 0 80 mg/dL      eGFR 60 ml/min/1 73sq m     Narrative:      [place] guidelines for Chronic Kidney Disease (CKD):   •  Stage 1 with normal or high GFR (GFR > 90 mL/min/1 73 square meters)  •  Stage 2 Mild CKD (GFR = 60-89 mL/min/1 73 square meters)  •  Stage 3A Moderate CKD (GFR = 45-59 mL/min/1 73 square meters)  •  Stage 3B Moderate CKD (GFR = 30-44 mL/min/1 73 square meters)  •  Stage 4 Severe CKD (GFR = 15-29 mL/min/1 73 square meters)  •  Stage 5 End Stage CKD (GFR <15 mL/min/1 73 square meters)  Note: GFR calculation is accurate only with a steady state creatinine    Magnesium [[id number]]  (Normal) Collected: 05/15/23 0503    Lab Status: Final result Specimen: Blood from Arm, Left Updated: 05/15/23 0559     Magnesium 2 0 mg/dL     APTT [[id number]]  (Normal) Collected: 05/15/23 0503    Lab Status: Final result Specimen: Blood from Arm, Left Updated: 05/15/23 0544     PTT 29 seconds     Protime-INR [[id number]]  (Normal) Collected: 05/15/23 0503    Lab Status: Final result Specimen: Blood from Arm, Left Updated: 05/15/23 0544     Protime 12 3 seconds      INR 0 89    CBC and differential [[id number]]  (Abnormal) Collected: 05/15/23 0503    Lab Status: Final result Specimen: Blood from Arm, Left Updated: 05/15/23 0522     WBC 11 76 Thousand/uL      RBC 4 66 Million/uL      Hemoglobin 15 6 g/dL      Hematocrit 45 4 %      MCV 97 fL      MCH 33 5 pg      MCHC 34 4 g/dL      RDW 12 2 %      MPV 11 8 fL      Platelets 836 Thousands/uL      nRBC 0 /100 WBCs      Neutrophils Relative 74 %      Immat GRANS % 1 %      Lymphocytes Relative 15 %      Monocytes Relative 8 %      Eosinophils Relative 2 %      Basophils Relative 0 %      Neutrophils Absolute 8 74 Thousands/µL      Immature Grans Absolute 0 09 Thousand/uL      Lymphocytes Absolute 1 79 Thousands/µL      Monocytes Absolute 0 92 Thousand/µL      Eosinophils Absolute 0 19 Thousand/µL      Basophils Absolute 0 03 Thousands/µL     Urine Microscopic [[id number]]  (Abnormal) Collected: 05/14/23 7179    Lab Status: Final result Specimen: Urine, Clean Catch Updated: 05/14/23 7774     RBC, UA 4-10 /hpf      WBC, UA 2-4 /hpf Epithelial Cells None Seen /hpf      Bacteria, UA Occasional /hpf      MUCUS THREADS Occasional    UA w Reflex to Microscopic w Reflex to Culture [[id number]]  (Abnormal) Collected: 05/14/23 2318    Lab Status: Final result Specimen: Urine, Clean Catch Updated: 05/14/23 2327     Color, UA Yellow     Clarity, UA Clear     Specific Gravity, UA 1 021     pH, UA 5 5     Leukocytes, UA Trace     Nitrite, UA Negative     Protein, UA Trace mg/dl      Glucose, UA Negative mg/dl      Ketones, UA Negative mg/dl      Urobilinogen, UA <2 0 mg/dl      Bilirubin, UA Negative     Occult Blood, UA Moderate    CK [[id number]]  (Abnormal) Collected: 05/14/23 2114    Lab Status: Final result Specimen: Blood from Arm, Right Updated: 05/14/23 2221     Total CK 1,331 U/L     Lipase [[id number]]  (Normal) Collected: 05/14/23 2114    Lab Status: Final result Specimen: Blood from Arm, Right Updated: 05/14/23 2205     Lipase 100 u/L     Comprehensive metabolic panel [[id number]]  (Abnormal) Collected: 05/14/23 2114    Lab Status: Final result Specimen: Blood from Arm, Right Updated: 05/14/23 2205     Sodium 136 mmol/L      Potassium 4 2 mmol/L      Chloride 106 mmol/L      CO2 31 mmol/L      ANION GAP -1 mmol/L      BUN 12 mg/dL      Creatinine 1 55 mg/dL      Glucose 88 mg/dL      Calcium 9 1 mg/dL      AST 59 U/L      ALT 47 U/L      Alkaline Phosphatase 60 U/L      Total Protein 7 2 g/dL      Albumin 3 5 g/dL      Total Bilirubin 0 31 mg/dL      eGFR 53 ml/min/1 73sq m     Narrative:      [place] guidelines for Chronic Kidney Disease (CKD):   •  Stage 1 with normal or high GFR (GFR > 90 mL/min/1 73 square meters)  •  Stage 2 Mild CKD (GFR = 60-89 mL/min/1 73 square meters)  •  Stage 3A Moderate CKD (GFR = 45-59 mL/min/1 73 square meters)  •  Stage 3B Moderate CKD (GFR = 30-44 mL/min/1 73 square meters)  •  Stage 4 Severe CKD (GFR = 15-29 mL/min/1 73 square meters)  •  Stage 5 End Stage CKD (GFR <15 mL/min/1 73 square meters)  Note: GFR calculation is accurate only with a steady state creatinine    CBC and differential [[id number]]  (Abnormal) Collected: 05/14/23 2114    Lab Status: Final result Specimen: Blood from Arm, Right Updated: 05/14/23 2128     WBC 11 76 Thousand/uL      RBC 4 91 Million/uL      Hemoglobin 15 9 g/dL      Hematocrit 47 7 %      MCV 97 fL      MCH 32 4 pg      MCHC 33 3 g/dL      RDW 12 2 %      MPV 12 0 fL      Platelets 744 Thousands/uL      nRBC 0 /100 WBCs      Neutrophils Relative 67 %      Immat GRANS % 1 %      Lymphocytes Relative 20 %      Monocytes Relative 8 %      Eosinophils Relative 3 %      Basophils Relative 1 %      Neutrophils Absolute 8 01 Thousands/µL      Immature Grans Absolute 0 11 Thousand/uL      Lymphocytes Absolute 2 34 Thousands/µL      Monocytes Absolute 0 95 Thousand/µL      Eosinophils Absolute 0 29 Thousand/µL      Basophils Absolute 0 06 Thousands/µL                  CT abdomen pelvis wo contrast   ED Interpretation by Garry Montes De Oca DO (05/14 2150)   Abnormal   Concern for kidney stone that is located in bladder      Final Result by Velma Kulkarni DO (05/14 2248)      Mild left hydronephrosis secondary to an obstructing 0 3 cm calculus at the left ureterovesicular junction, located within the urinary bladder  The study was marked in [facility] for immediate notification  Workstation performed: [id number]         FL retrograde pyelogram    (Results Pending)         Procedures  Procedures      ED Course  ED Course as of 05/15/23 1551   Sun May 14, 2023   2131 WBC(!): 11 76   2221 Total CK(!): 1,331  Concern for rhabdomyolysis  2221 Creatinine(!): 1 55  Elevated creatinine  Patient creatinine is within previous range  2301 CT abdomen pelvis wo contrast  Mild left hydronephrosis secondary to an obstructing 0 3 cm calculus at the left ureterovesicular junction, located within the urinary bladder  The study was marked in [facility] for immediate notification  Workstation performed: [id number]     2177 Patient is reevaluated at this time  Patient has been informed of the test results that he has a kidney stone and mild hydronephrosis  Patient has required 2 5 mg of Dilaudid  Patient has been offered admission to the hospital for pain control  Patient states he would like time to think about admission  2352 WBC, UA(!): 2-4   2352 RBC, UA(!): 4-10   2352 Leukocytes, UA(!): Trace   2352 Blood, UA(!): Moderate                                       Medical Decision Making  64-year-old male presents emergency department complaining of left-sided back pain    DDx: Muscle strain, nephrolithiasis, rhabdo    Plan: Basic labs, CT imaging, urinalysis, pain control    Work-up performed in the emergency department is concerning for ureteral stone  Stone is approximately 3 mm in size right at the UVJ  Patient has mild hydronephrosis on CAT scan left kidney  While in the emergency department the patient has received 2 5 mg of Dilaudid for pain control  Patient was offered admission to the hospital in observation status which she accepted  Patient will be evaluated by urology while he is here given his elevated creatinine that has been elevated for at least 6 months now  Amount and/or Complexity of Data Reviewed  Labs: ordered  Decision-making details documented in ED Course  Radiology: ordered and independent interpretation performed  Decision-making details documented in ED Course  Risk  OTC drugs  Prescription drug management  Decision regarding hospitalization              Disposition  Final diagnoses:   Nephrolithiasis   Hydronephrosis   Rhabdomyolysis     Time reflects when diagnosis was documented in both MDM as applicable and the Disposition within this note     Time User Action Codes Description Comment    5/14/2023 11:07 PM Armando Dougherty Add [N20 0] Nephrolithiasis     5/14/2023 11:07 PM Armando Dougherty Add [N13 30] Hydronephrosis     5/14/2023 11:07 PM Taryn Oshea Add [M62 82] Rhabdomyolysis     5/15/2023 12:08 PM Chasity Alberto Modify [N20 0] Nephrolithiasis     5/15/2023  2:40 PM Charo Castillo Modify [N20 0] Nephrolithiasis     5/15/2023  2:40 PM Charo Castillo Add [R79 89] Elevated serum creatinine       ED Disposition     ED Disposition   Admit    Condition   Stable    Date/Time   Mon May 15, 2023 12:37 AM    Comment   Case was discussed with Dr Hari Del Rosario and the patient's admission status was agreed to be Admission Status: observation status to the service of Dr Hari Del Rosario              Follow-up Information     Follow up With Specialties Details Why Contact Info Additional [address] [facility], [address] Emergency Department Emergency Medicine Go to  If symptoms worsen [address] [id number]  [address] [facility] [address] Emergency Department, [address], [facility] [place], [address] [facility]    Adore Garrison MD Urology Follow up in 1 week(s)  [address] [facility]  [address] [facility] [address] [facility]  [phone]             Discharge Medication List as of 5/15/2023  2:48 PM      START taking these medications    Details   oxybutynin (DITROPAN) 5 mg tablet Take 1 tablet (5 mg total) by mouth 3 (three) times a day as needed (Bladder spasms/stent discomfort), Starting Mon 5/15/2023, Normal      phenazopyridine (PYRIDIUM) 200 mg tablet Take 1 tablet (200 mg total) by mouth 3 (three) times a day as needed for bladder spasms, Starting Mon 5/15/2023, Normal      tamsulosin (FLOMAX) 0 4 mg Take 1 capsule (0 4 mg total) by mouth daily with dinner for 5 days, Starting Mon 5/15/2023, Until Sat 5/20/2023, Normal         CONTINUE these medications which have NOT CHANGED    Details   semaglutide, 0 25 or 0 5 mg/dose, (Ozempic, 0 25 or 0 5 MG/DOSE,) 2 mg/3 mL injection pen Inject 0 25mg once every week, Normal Outpatient Discharge Orders   Ambulatory Referral to Urology   Standing Status: Future Standing Exp  Date: 05/14/24      Ambulatory Referral to Nephrology   Standing Status: Future Standing Exp  Date: 05/15/24      Discharge Diet       PDMP Review       Value Time User    PDMP Reviewed  Yes 4/29/2021  3:59 PM Xiao Bhandari MD           ED Provider  Attending physically available and evaluated [facility]  I managed the patient along with the ED Attending      Electronically Signed by         Gabby Renner DO  05/15/23 7846

## 2023-05-15 NOTE — ASSESSMENT & PLAN NOTE
· Creatinine 1 5, appears it was 1 5 six months ago and then 1 4  · Appears to be baseline however patient is unaware of diagnosis of CKD  · Microalbumin to creatinine ratio is increased  · Will send ambulatory referral for nephrology

## 2023-05-15 NOTE — LETTER
May 16, 2023     Patient: Angelina Lebron  YOB: 1980  Date of Visit: 5/15/2023      To Whom it May Concern:    Angelina Lebron is under my professional care  Jordi Munoz had surgery performed on 5/15/2023  Jordi Munoz may return to work on May 22, 2023 with no restrictions  If you have any questions or concerns, please don't hesitate to call           Sincerely,          Betito Husain MD

## 2023-05-15 NOTE — CONSULTS
UROLOGY CONSULTATION NOTE     Patient Identifiers: Octaviano Jones (MRN [id number])  Service Requesting Consultation: Medicine  Service Providing Consultation:  Urology, Lisandro Villegas MD    Date of Service: 5/15/2023  Inpatient consult to Urology  Consult performed by: Lisandro Villegas MD  Consult ordered by: Cathryn Ellison DO          Reason for Consultation: Tala Flattery left ureteral calculus    History of Present Illness:     Octaviano Jones is a 37 y o  old with a history of severe left flank pain on 5/13  The pain became more severe and radiated down to the left lower quadrant  There is no fever, chills, vomiting  There was nausea associated with the pain  CT scan revealed obstructing 3 mm left UVJ calculus with hydronephrosis  He was admitted for observation and pain management  He reports that pain is improving, though he does not think that his urine was strained since admission  He is concerned about undergoing a procedure      Past Medical, Past Surgical History:     Past Medical History:   Diagnosis Date   • Epilepsy ([facility] [address] )     and recurrent seizures, one time age 13   :    Past Surgical History:   Procedure Laterality Date   • ANTERIOR CRUCIATE LIGAMENT REPAIR Right    :    Medications, Allergies:     Current Facility-Administered Medications   Medication Dose Route Frequency   • acetaminophen (TYLENOL) tablet 650 mg  650 mg Oral Q6H PRN   • [MAR Hold] HYDROmorphone (DILAUDID) injection 0 5 mg  0 5 mg Intravenous Q6H PRN   • [MAR Hold] ondansetron (ZOFRAN) injection 4 mg  4 mg Intravenous Q6H PRN   • [MAR Hold] oxyCODONE (ROXICODONE) IR tablet 5 mg  5 mg Oral Q6H PRN   • sodium chloride 0 9 % infusion  125 mL/hr Intravenous Continuous   • [MAR Hold] tamsulosin (FLOMAX) capsule 0 4 mg  0 4 mg Oral Daily With Dinner       Allergies:  No Known Allergies:    Social and Family History:   Social History:   Social History     Tobacco Use   • Smoking status: Never   • Smokeless tobacco: Never Vaping Use   • Vaping Use: Never used   Substance Use Topics   • Alcohol use: Yes     Comment: socially   • Drug use: Never     Social History     Tobacco Use   Smoking Status Never   Smokeless Tobacco Never       Family History:  Family History   Problem Relation Age of Onset   • Other Mother         liver transplant   :     Review of Systems:     General: Fever, chills, or night sweats: negative  Cardiac: Negative for chest pain  Pulmonary: Negative for shortness of breath  Gastrointestinal: Abdominal pain negative  Nausea, vomiting, or diarrhea negative,  Genitourinary: See HPI above  Patient does not have hematuria  All other systems queried were negative  Physical Exam:   General: Patient is pleasant and in NAD  Awake and alert  /75 (BP Location: Left arm)   Pulse 73   Temp 98 3 °F (36 8 °C) (Oral)   Resp 18   SpO2 98% Temp (24hrs), Av 3 °F (36 8 °C), Min:98 °F (36 7 °C), Max:98 5 °F (36 9 °C)  current; Temperature: 98 3 °F (36 8 °C)  I/O last 24 hours:   In: 1000 [IV Piggyback:1000]  Out: 400 [Urine:400]  General appearance: alert and oriented, in no acute distress  Eyes: negative  Lungs: clear to auscultation bilaterally  Heart: regular rate and rhythm  Abdomen: soft, non-tender; bowel sounds normal; no masses,  no organomegaly  Extremities: extremities normal, warm and well-perfused; no cyanosis, clubbing, or edema  Neurologic: Grossly normal    (Male): No CVA tenderness    Labs:     Lab Results   Component Value Date    HGB 15 6 05/15/2023    HCT 45 4 05/15/2023    WBC 11 76 (H) 05/15/2023     05/15/2023   ]    Lab Results   Component Value Date    K 4 4 05/15/2023     05/15/2023    CO2 29 05/15/2023    BUN 11 05/15/2023    CREATININE 1 40 (H) 05/15/2023    CALCIUM 8 5 05/15/2023   ]    Imaging:   I personally reviewed the images and report of the following studies, and reviewed them with the patient:    CT Abdomen: CT scan images personally reviewed, with obstructing left UVJ calculus  ASSESSMENT:     37 y o  old male with  UVJ stone  Patient currently pain-free  Rivka Gamez PLAN:     We discussed his options including observation and intervention with ureteroscopy, stone extraction and stent placement  Technique, risk, benefits reviewed  After considering his options, he would like to proceed with observation  We will recommend hydration with at least 2 L of water daily  Analgesics as needed  May discharge home with appropriate precautions for returning to the emergency department  ADDENDUM 1130:  Patient has reconsidered and now wants stone intervention at this time  We will proceed as previously discussed with left ureteroscopy, possible laser, stone extraction, stent placement  Technique, risk, benefits reviewed and consent obtained  Thank you for allowing me to participate in this patients’ care  Please do not hesitate to call with any additional questions    Ivis Kent MD

## 2023-05-15 NOTE — OP NOTE
OPERATIVE REPORT  PATIENT NAME: Isabel Escudero    :  1980  MRN: [id number]  Pt Location:  CYST[facility] ROOM 01    SURGERY DATE: 5/15/2023    Surgeon(s) and Role:     * Lisa Jacinto MD - Primary    Preop Diagnosis:  Nephrolithiasis [N20 0]    Post-Op Diagnosis Codes:     * Nephrolithiasis [N20 0]    Procedure(s):  Left - CYSTOSCOPY RETROGRADE PYELOGRAM WITH URETEROSCOPY  STONE EXTRACTION  INSERTION STENT URETERAL LEFT    Specimen(s):  ID Type Source Tests Collected by Time Destination   A :  Calculus Ureter, Left STONE ANALYSIS Lisa Jacinto MD 5/15/2023 1208        Estimated Blood Loss:   Minimal    Drains:  * No LDAs found *    Anesthesia Type:   General    Operative Indications:  Nephrolithiasis [N20 0]      Operative Findings:  Impacted left UVJ stone, retrieved  Complications:   None    Procedure and Technique:  The patient was identified, brought to the operating room, and placed on the table in supine position  After induction of general anesthesia, the patient was placed in dorsal lithotomy position and prepped and draped in the usual sterile fashion  A complete formal timeout was performed  The 25 Mosotho rigid cystoscope was placed per urethra and cystoscopy was performed  There was no bladder abnormality identified  The Left ureteral orifice was identified and cannulated with a Solo wire  A semirigid ureteroscope was then placed alongside the wire into the ureter, and the stone was identified  A 4 wire stone basket was placed and the stone fragments were retrieved  At this point, a retrograde pyelogram was performed delineating the upper urinary tract anatomy  No further filling defect identified  The ureteral length was measured  The safety wire was backloaded into the cystoscope and a 26 cm x 6 Mosotho double-J stent was placed with string  The patient tolerated the procedure well and was transferred to the recovery room awake alert and in stable condition      Plan: discharge when stable  Stent/string for 3 days  I was present for the entire procedure      Patient Disposition:  PACU         SIGNATURE: Indigo Briggs MD  DATE: May 15, 2023  TIME: 12:18 PM

## 2023-05-15 NOTE — ASSESSMENT & PLAN NOTE
· Left flank pain as well as lower quadrant abdominal pain over the past 2 days which became severe in nature earlier today  · CT abdomen pelvis without contrast with mild left-sided hydronephrosis secondary to obstructing 0 3 cm calculus at the left ureterovesicalar junction, located within the urinary bladder  · Evaluated by urology, underwent cystoscopy, impacted stone was retrieved s/p stent placement  · Patient will be discharged on oxybutynin and Pyridium as needed  · Will continue Flomax on discharge

## 2023-05-15 NOTE — ANESTHESIA PREPROCEDURE EVALUATION
Procedure:  CYSTOSCOPY RETROGRADE PYELOGRAM WITH INSERTION STENT URETERAL (Left: Bladder)    Relevant Problems   CARDIO   (+) Pure hypercholesterolemia      /RENAL   (+) Hydronephrosis      MUSCULOSKELETAL   (+) Chronic bilateral low back pain without sciatica   (+) Impingement syndrome of left shoulder   (+) Impingement syndrome of right shoulder   (+) Left lateral epicondylitis   (+) Primary osteoarthritis of left shoulder   (+) Primary osteoarthritis of right knee   (+) Right lateral epicondylitis   (+) Strain of calf muscle   (+) TMJ (dislocation of temporomandibular joint)      NEURO/PSYCH   (+) Chronic bilateral low back pain without sciatica   (+) Chronic right shoulder pain   (+) Elbow pain, chronic, left   (+) Muscle weakness of right upper extremity        Physical Exam    Airway    Mallampati score: II  TM Distance: >3 FB  Neck ROM: full     Dental       Cardiovascular      Pulmonary      Other Findings        Anesthesia Plan  ASA Score- 2     Anesthesia Type- general with ASA Monitors  Additional Monitors:   Airway Plan: LMA  Plan Factors-    Chart reviewed  Induction- intravenous  Postoperative Plan-     Informed Consent- Anesthetic plan and risks discussed with patient  I personally reviewed this patient with the CRNA  Discussed and agreed on the Anesthesia Plan with the CRNA  Michelle Larsen

## 2023-05-15 NOTE — DISCHARGE SUMMARY
[address] [facility]  Discharge- Florian Lundy 1980, 37 y o  male MRN: [id number]  Unit/Bed#: 2 211-02 Encounter: [id number]  Primary Care Provider: Negro Raamchandran DO   Date and time admitted to hospital: 5/14/2023  8:05 PM    Elevated serum creatinine  Assessment & Plan  · Creatinine 1 5, appears it was 1 5 six months ago and then 1 4  · Appears to be baseline however patient is unaware of diagnosis of CKD  · Microalbumin to creatinine ratio is increased  · Will send ambulatory referral for nephrology  Chronic fatigue  Assessment & Plan  · Reports that he takes exogenous testosterone    * Hydronephrosis  Assessment & Plan  · Left flank pain as well as lower quadrant abdominal pain over the past 2 days which became severe in nature earlier today  · CT abdomen pelvis without contrast with mild left-sided hydronephrosis secondary to obstructing 0 3 cm calculus at the left ureterovesicalar junction, located within the urinary bladder  · Evaluated by urology, underwent cystoscopy, impacted stone was retrieved s/p stent placement  · Patient will be discharged on oxybutynin and Pyridium as needed  · Will continue Flomax on discharge  Medical Problems     Resolved Problems  Date Reviewed: 5/15/2023   None       Discharging Physician / Practitioner: Kylee Garcia MD  PCP: Negro Ramachandran DO  Admission Date:   Admission Orders (From admission, onward)     Ordered        05/15/23 0038  Place in Observation  Once                      Discharge Date: 05/15/23    Consultations During Hospital Stay:  · Urology  Procedures Performed:   · Cystoscopy, stone extraction and stent placement    Significant Findings / Test Results:   · CT abdomen pelvis: Mild left hydronephrosis secondary to an obstructing 0 3 cm calculus at the left ureterovesicular junction located within the bladder      Incidental Findings:       Test Results Pending at Discharge (will require follow up):   · no Outpatient Tests Requested:  · no    Complications:  n0    Reason for Admission: left flank pain  Hospital Course:   Lucianne Sandhoff is a 37 y o  male patient who originally presented to the hospital on 5/14/2023 due to left flank pain, CT abdomen pelvis remarkable for left hydronephrosis secondary to an obstructing 0 3 cm calculus  Evaluated by urology, s/p cystoscopy, stone retraction and stent placement  Patient tolerated procedure very well  He will be discharged with oxybutynin as needed  Continue Flomax on discharge  He will follow-up with urology in outpatient setting, ultrasound kidney bladder ordered in 6 weeks by urology  Please see above list of diagnoses and related plan for additional information  Condition at Discharge: stable    Discharge Day Visit / Exam:   Subjective: Patient evaluated multiple times throughout the day  Initially he was taken to the OR but had confusion as he was not aware of procedure however had extensive discussion with patient's wife and patient at bedside  Appreciate urology's help he was taken to the OR again and had procedure done  Vitals: Blood Pressure: 127/75 (05/15/23 1245)  Pulse: 72 (05/15/23 1245)  Temperature: 98 °F (36 7 °C) (05/15/23 1245)  Temp Source: Oral (05/15/23 0601)  Respirations: 18 (05/15/23 1245)  SpO2: 97 % (05/15/23 1245)  Exam:   Physical Exam  Constitutional:       Appearance: Normal appearance  HENT:      Head: Normocephalic and atraumatic  Mouth/Throat:      Mouth: Mucous membranes are moist       Pharynx: Oropharynx is clear  Eyes:      Conjunctiva/sclera: Conjunctivae normal       Pupils: Pupils are equal, round, and reactive to light  Cardiovascular:      Rate and Rhythm: Normal rate and regular rhythm  Pulses: Normal pulses  Heart sounds: Normal heart sounds  Pulmonary:      Effort: Pulmonary effort is normal       Breath sounds: Normal breath sounds  Abdominal:      General: Abdomen is flat   Bowel sounds are normal    Skin:     General: Skin is warm and dry  Neurological:      General: No focal deficit present  Mental Status: He is alert and oriented to person, place, and time  Psychiatric:         Mood and Affect: Mood normal          Behavior: Behavior normal           Discussion with Family: Updated  (wife) at bedside  Discharge instructions/Information to patient and family:   See after visit summary for information provided to patient and family  Provisions for Follow-Up Care:  See after visit summary for information related to follow-up care and any pertinent home health orders  Disposition:   Home    Planned Readmission: No     Discharge Statement:  I spent 45 minutes discharging the patient  This time was spent on the day of discharge  I had direct contact with the patient on the day of discharge  Greater than 50% of the total time was spent examining patient, answering all patient questions, arranging and discussing plan of care with patient as well as directly providing post-discharge instructions  Additional time then spent on discharge activities  Discharge Medications:  See after visit summary for reconciled discharge medications provided to patient and/or family        **Please Note: This note may have been constructed using a voice recognition system**

## 2023-05-15 NOTE — TELEPHONE ENCOUNTER
S/p URS with stone extraction 5/15/2023  Stent on a string with removal in 3 days    We will need AP follow-up 6 weeks with ultrasound prior orders placed

## 2023-05-15 NOTE — H&P
[address] [facility]  H&P  Name: Lindi Burkitt 37 y o  male I MRN: [id number]  Unit/Bed#: ED 32 I Date of Admission: 5/14/2023   Date of Service: 5/15/2023 I Hospital Day: 0      Assessment/Plan   * Hydronephrosis  Assessment & Plan  · Left flank pain as well as lower quadrant abdominal pain over the past 2 days which became severe in nature earlier today  · CT abdomen pelvis without contrast with mild left-sided hydronephrosis secondary to obstructing 0 3 cm calculus at the left ureterovesicalar junction, located within the urinary bladder  · Creatinine noted to be 1 5 today and appears it was 1 5 six months ago; Denies any known hx of CKD  · Noted elevated CPK  · Afebrile, no leukocytosis  · UA with noted blood, only 2-4 WBCs   · Given IV pain medications but still with continued pain and as such ER team requesting admission for observation given persistent pain in the setting of image findings   · Admit to medicine observation status  IV fluids  Pain control overnight  Start Flomax  We will consult urology team given patient's persistent pain with imaging findings and elevated creatinine  With respect to patient's elevated creatinine, does appear that his similar range 6 months ago but he denies any history of CKD  Will order urine microalbumin/creatinine ratio to monitor  Follow-up BMP this morning  If creatinine worsening, may need repeat imaging  Elevated serum creatinine  Assessment & Plan  · Creatinine 1 5, appears it was 1 5 six months ago and then 1 4    Chronic fatigue  Assessment & Plan  · Reports that he takes exogenous testosterone             VTE Prophylaxis: Heparin  / sequential compression device   Code Status: Level 1 - Full Code       Anticipated Length of Stay:  Patient will be admitted on an Observation basis with an anticipated length of stay of  < 2 midnights  Justification for Hospital Stay: Please see detailed plans noted above      Chief Complaint: Obstructing ureteral calculi  History of Present Illness:  Carolyne Shepard is a 37 y o  male who has past medical history significant for chronic fatigue, shoulder pain, low TSH who presented to [facility] ER on the evening of 5/14 with symptoms of left flank pain as well as left lower quadrant abdominal pain that started Saturday but then became severe in nature this evening  Patient reports his pain started with a left flank pain that was moderate in intensity and constant Saturday morning but he reports that it felt like it went away for couple hours  He reports that he then played basketball with his son and after the game he started to feel the pain come back again  He reports that this morning he felt the pain became more severe in the left flank and that it started to come and wraparound to the left lower quadrant of the abdomen  He reports that this pain became constant in nature  He reports associated nausea  He denies any fevers  He does report using Flexeril at home with some relief of the pain  He denies ever having had a kidney stone before    Patient denies any chest pain or shortness of breath      Review of Systems:    Constitutional:  Denies fever or chills   Eyes:  Denies change in visual acuity   HENT:  Denies nasal congestion or sore throat   Respiratory:  Denies cough or shortness of breath   Cardiovascular:  Denies chest pain or edema   GI: Positive for abdominal pain  :  Denies dysuria   Musculoskeletal: Positive for flank pain  Integument:  Denies rash   Neurologic:  Denies headache or sensory changes   Endocrine:  Denies polyuria or polydipsia   Lymphatic:  Denies swollen glands   Psychiatric:  Denies depression or anxiety     Past Medical and Surgical History:   Past Medical History:   Diagnosis Date   • Epilepsy ([facility] [address] )     and recurrent seizures, one time age 13     Past Surgical History:   Procedure Laterality Date   • ANTERIOR CRUCIATE LIGAMENT REPAIR Right Meds/Allergies:  No current facility-administered medications on file prior to encounter  Current Outpatient Medications on File Prior to Encounter   Medication Sig Dispense Refill   • semaglutide, 0 25 or 0 5 mg/dose, (Ozempic, 0 25 or 0 5 MG/DOSE,) 2 mg/3 mL injection pen Inject 0 25mg once every week 2 mL 1     Allergies: No Known Allergies    History:  Marital Status: /Civil Union     Substance Use History:   Social History     Substance and Sexual Activity   Alcohol Use Yes    Comment: socially     Social History     Tobacco Use   Smoking Status Never   Smokeless Tobacco Never     Social History     Substance and Sexual Activity   Drug Use Never       Family History:  Family History   Problem Relation Age of Onset   • Other Mother         liver transplant       Physical Exam:     Vitals:   Blood Pressure: 145/76 (05/14/23 1948)  Pulse: 84 (05/14/23 1948)  Temperature: 98 5 °F (36 9 °C) (05/14/23 1948)  Temp Source: Oral (05/14/23 1948)  Respirations: 18 (05/14/23 1948)  SpO2: 98 % (05/14/23 1948)    Constitutional:  A&Ox4, Appears in pain  Eyes:  EOMI, No scleral icterus   HENT:   oropharynx moist, external ears normal, external nose normal   Respiratory:  No respiratory distress, no wheezing   Cardiovascular:  Normal rate, no murmurs   GI:  Soft, nondistended, no guarding   :  Left CVA Tenderness  Musculoskeletal:  no tenderness, no deformities  Back- no tenderness  Integument:  no jaundice, no rash   Neurologic:  Alert &awake, communicative, CN 2-12 normal,  no focal deficits noted         Lab Results: I have personally reviewed pertinent reports        Results from last 7 days   Lab Units 05/14/23  2114   WBC Thousand/uL 11 76*   HEMOGLOBIN g/dL 15 9   HEMATOCRIT % 47 7   PLATELETS Thousands/uL 200   NEUTROS PCT % 67   LYMPHS PCT % 20   MONOS PCT % 8   EOS PCT % 3     Results from last 7 days   Lab Units 05/14/23  2114   POTASSIUM mmol/L 4 2   CHLORIDE mmol/L 106   CO2 mmol/L 31   BUN mg/dL 12   CREATININE mg/dL 1 55*   CALCIUM mg/dL 9 1   ALK PHOS U/L 60   ALT U/L 47   AST U/L 59*             Imaging: I have personally reviewed pertinent reports  CT abdomen pelvis wo contrast    Result Date: 5/14/2023  Narrative: CT ABDOMEN AND PELVIS WITHOUT IV CONTRAST INDICATION:   LUQ abdominal pain abd pain, dionyn simon  COMPARISON:  None  TECHNIQUE:  CT examination of the abdomen and pelvis was performed without intravenous contrast  Multiplanar 2D reformatted images were created from the source data  This examination, like all CT scans performed in the [facility], was performed utilizing techniques to minimize radiation dose exposure, including the use of iterative reconstruction and automated exposure control  Radiation dose length product (DLP) for this visit:  578 13 mGy-cm Enteric contrast was not administered  FINDINGS: ABDOMEN LOWER CHEST:  No clinically significant abnormality identified in the visualized lower chest  LIVER/BILIARY TREE:  Unremarkable  GALLBLADDER:  No calcified gallstones  No pericholecystic inflammatory change  SPLEEN:  Unremarkable  PANCREAS:  Unremarkable  ADRENAL GLANDS:  Unremarkable  KIDNEYS/URETERS:  Mild left hydronephrosis secondary to an obstructing 0 3 cm calculus at the left ureterovesicular junction  STOMACH AND BOWEL:  There is colonic diverticulosis without evidence of acute diverticulitis  APPENDIX:  A normal appendix was visualized  ABDOMINOPELVIC CAVITY:  No ascites  No pneumoperitoneum  No lymphadenopathy  VESSELS:  Unremarkable for patient's age  PELVIS REPRODUCTIVE ORGANS:  Unremarkable for patient's age  URINARY BLADDER: Stone within the bladder at the left UVJ  ABDOMINAL WALL/INGUINAL REGIONS:  Unremarkable  OSSEOUS STRUCTURES:  No acute fracture or destructive osseous lesion  Spinal degenerative changes are noted       Impression: Mild left hydronephrosis secondary to an obstructing 0 3 cm calculus at the left ureterovesicular junction, located within the urinary bladder  The study was marked in [facility] for immediate notification  Workstation performed: [id number]       Total time for visit, including counseling/coordination of care: 45 minutes  Greater than 50% of this total time spent on direct patient counseling and coorination of care  Epic Records Reviewed as well as Records in Care Everywhere    ** Please Note: Dragon 360 Dictation voice to text software was used in the creation of this document   **

## 2023-05-15 NOTE — INCIDENTAL FINDINGS
The following findings require follow up:  Radiographic finding   Finding: Hydronephrosis due to 3 mm stone   Follow up required: yes   Follow up should be done within 1 week(s)    Please notify the following clinician to assist with the follow up:  Urology

## 2023-05-15 NOTE — ANESTHESIA POSTPROCEDURE EVALUATION
Post-Op Assessment Note    CV Status:  Stable  Pain Score: 0    Pain management: adequate     Mental Status:  Sleepy and arousable   Hydration Status:  Stable   PONV Controlled:  None   Airway Patency:  Patent      Post Op Vitals Reviewed: Yes      Staff: CRNA         No notable events documented      BP   136/87   Temp   98 3   Pulse  76   Resp   14   SpO2   96% room air

## 2023-05-15 NOTE — DISCHARGE INSTR - AVS FIRST PAGE
Your stent may cause you a lot of discomfort or blood in the urine this is normal  Please take oxybutynin 3 times a day as needed for discomfort  You can take Pyridium 3 times a day as needed for discomfort  Please take Flomax daily to help relax the ureter  You can take additional ibuprofen if needed for any discomfort  Make sure you are drinking at least 2 L of water a day unless you have a fluid restriction  Your stent will come out in 3 days    You can pull this out at home or follow-up in the office for removal  We will plan to see you in 6 weeks with an ultrasound prior  Please call [phone] to schedule your ultrasound

## 2023-05-15 NOTE — ASSESSMENT & PLAN NOTE
· Left flank pain as well as lower quadrant abdominal pain over the past 2 days which became severe in nature earlier today  · CT abdomen pelvis without contrast with mild left-sided hydronephrosis secondary to obstructing 0 3 cm calculus at the left ureterovesicalar junction, located within the urinary bladder  · Creatinine noted to be 1 5 today and appears it was 1 5 six months ago; Denies any known hx of CKD  · Noted elevated CPK  · Afebrile, no leukocytosis  · UA with noted blood, only 2-4 WBCs   · Given IV pain medications but still with continued pain and as such ER team requesting admission for observation given persistent pain in the setting of image findings   · Admit to medicine observation status  IV fluids  Pain control overnight  Start Flomax  We will consult urology team given patient's persistent pain with imaging findings and elevated creatinine  With respect to patient's elevated creatinine, does appear that his similar range 6 months ago but he denies any history of CKD  Will order urine microalbumin/creatinine ratio to monitor  Follow-up BMP this morning  If creatinine worsening, may need repeat imaging

## 2023-05-16 ENCOUNTER — TELEPHONE (OUTPATIENT)
Dept: FAMILY MEDICINE CLINIC | Facility: CLINIC | Age: 43
End: 2023-05-16

## 2023-05-16 ENCOUNTER — DOCUMENTATION (OUTPATIENT)
Dept: NEPHROLOGY | Facility: CLINIC | Age: 43
End: 2023-05-16

## 2023-05-16 NOTE — TELEPHONE ENCOUNTER
Post Op Note    Richar Marion is a 37 y o  male s/p CYSTOSCOPY RETROGRADE PYELOGRAM WITH URETEROSCOPY, STONE EXTRACTION,  INSERTION STENT URETERAL LEFT (Left: Bladder) performed 5/15/2023  Richar Marion had surgery by Dr Deb Conley and is seen at the [facility] office  Pain after surgery? Patient states he has some discomfort when he urinates to his left side  Advised that is normal due to stent in place  Advised he can take OTC pain medication as well as hydrating well with water  Do you have any difficulty urinating? No   Patient states he started taking the medications that were prescribed for him yesterday and he noted that his urine was orange  Advised that his normal and is from the Pyridium medication that causes that  Do you have any other questions or concerns that I can address at this time? Spoke to patient he states he is doing well after his surgery  Patient had questions regarding symptoms he is having and advised they are normal symptoms with the stent in place  Advised of Dr Jefferson Esparza recommendation of removing the stent in 3 days  Advised patient if he would be comfortable removing the stent at home  Education was provided on stent removal and patient is comfortable removing it himself  Advised he can remove it Thursday morning and will contact him in the afternoon to see how he is doing  Follow up appointment has been scheduled with AP at the [facility] office in 6 weeks  Advised of obtaining an ultrasound prior to this appointment  Central scheduling number was provided and advised to have this performed at least 2 weeks prior to his appointment on June 27  Patient also requested a work note  Email was confirmed and advised will be emailing patient his work note  Advised he can contact the office in the meantime with any questions or concerns

## 2023-05-17 ENCOUNTER — TELEPHONE (OUTPATIENT)
Dept: FAMILY MEDICINE CLINIC | Facility: CLINIC | Age: 43
End: 2023-05-17

## 2023-05-17 NOTE — TELEPHONE ENCOUNTER
Patient called stating he had surgery and has stent and wants to know if he can be seen to have it removed  He wants it removed today    He does not feel comfortable to pull it himself    Patient can be reached at [phone]

## 2023-05-17 NOTE — TELEPHONE ENCOUNTER
Contacted and spoke with the patient to review Debra's recommendations  Informed patient he should wait one more day for stent removal   Patient scheduled 5/18/2023 at 0900 at the [facility] office

## 2023-05-17 NOTE — TELEPHONE ENCOUNTER
Contacted and spoke with the patient at this time  Ailin Kitchen states the string is too annoying and cannot do anything  Requesting for the stent to be removed today  Discussed with the patient the only concern of removing stent too early is that  he may develop severe flank pain needing to proceed to the ER  Will send encounter to AP for advice then call patient back

## 2023-05-17 NOTE — TELEPHONE ENCOUNTER
I would recommend he maintain the stent for 1 more day to allow his kidney to fully drain and heal as well as decrease inflammation

## 2023-05-18 ENCOUNTER — TELEPHONE (OUTPATIENT)
Dept: NEPHROLOGY | Facility: CLINIC | Age: 43
End: 2023-05-18

## 2023-05-18 ENCOUNTER — PROCEDURE VISIT (OUTPATIENT)
Dept: UROLOGY | Facility: CLINIC | Age: 43
End: 2023-05-18

## 2023-05-18 VITALS
DIASTOLIC BLOOD PRESSURE: 90 MMHG | HEART RATE: 76 BPM | RESPIRATION RATE: 20 BRPM | WEIGHT: 216 LBS | BODY MASS INDEX: 34.72 KG/M2 | HEIGHT: 66 IN | SYSTOLIC BLOOD PRESSURE: 130 MMHG

## 2023-05-18 DIAGNOSIS — N20.0 CALCULUS OF KIDNEY: Primary | ICD-10-CM

## 2023-05-18 NOTE — TELEPHONE ENCOUNTER
New Patient Intake Form   Patient Details   Gail Ko     1980     [id number]     Insurance Information   Name of [address] [facility] [address]   Does the patient need an insurance referral? no   If patient has [employer], please ask if they will be using their [employer]  Appointment Information   Who is calling to schedule? If not patient, what is callers name? Patient   Referring Provider Taryn Abdi MD   Reason for Appt (Diagnosis) Elevated serum creatinine   Does Patient have labs/urine done at [place] [address]? If not, where do they go? List the date of last lab / urine  *Please try to get labs 2 years back if not at  yes   Has patient been hospitalized recently? If yes, list name and location of hospital they were in Yes 5/14/23 [address]   Has patient been seen by a Nephrologist before? If yes, list name, location and phone number Patient not sure he thinks in 2021 cant remember where or who  Has the patient had renal imaging done? If so, list the most recent date and type of imaging yes scheduled 5/31/23   Does patient have a history of Kidney Stones? yes   Appointment Details   Is there a referral on file?  yes    Appointment Date 7/19/23    Location  [place]   Miscellaneous   added to cancellation list
52y old female PMH Cystocele, hysterectomy walk in from triage c/o fever. A&Ox3. Patient states that on December 15th 2020 she had a hysterectomy performed one of the incisions had an infection, rx bactrim while in [place] a week ago, today had a fever at home of 103 F w/ no other symptoms. Endorses taking Aleve at 4pm today. Tested COVID negative this week. She denies chest pain, sob, ha, n/v/d, abdominal pain, urinary symptoms, hematuria.  Patient is well appearing,  gross neuro intact, lungs cta bilaterally, no difficulty speaking in complete sentences, s1s2 heart sounds heard,  abdomen soft nontender nondistended, skin intact, incision has no purulent drainage present.

## 2023-05-18 NOTE — PROGRESS NOTES
"5/18/2023  Tasha Lockett is a 37 y o  male  [id number]        Diagnosis  Chief Complaint    Nephrolithiasis         Patient is s/p left ureteroscopy with stone extraction on 5/15/2023 with Dr Ivan Avila  Follow up in 6 weeks with AP  Obtain USK prior to visit,      Procedure Stent with String Removal    Vitals:    05/18/23 0856   BP: 130/90   Pulse: 76   Resp: 20   Weight: 98 kg (216 lb)   Height: 5' 6\" (1 676 m)       Stent with string removed intact without difficulty  Reviewed post stent removal symptoms including flank pain, dysuria, and hematuria  Instructed patient to increase oral fluid intake  Encouraged the use of NSAIDS and other prescribed pain medication as needed for discomfort  Patient instructed to call the office or report to the ER for uncontrolled pain, fever, chills, nausea or vomiting         Lorene Tam RN  "

## 2023-05-19 NOTE — TELEPHONE ENCOUNTER
I spoke to pt  He is going to call his insurance company to see what alternative meds are available and call us back with that info

## 2023-05-20 LAB
CALCIUM OXALATE DIHYDRATE MFR STONE IR: 20 %
COLOR STONE: NORMAL
COM MFR STONE: 60 %
COMMENT-STONE3: NORMAL
COMPOSITION: NORMAL
HYDROXYAPATITE 24H ENGDIFF UR: 20 %
LABORATORY COMMENT REPORT: NORMAL
PHOTO: NORMAL
SIZE STONE: NORMAL MM
SPEC SOURCE SUBJ: NORMAL
STONE ANALYSIS-IMP: NORMAL
WT STONE: 55 MG

## 2023-05-22 DIAGNOSIS — R63.4 WEIGHT LOSS: Primary | ICD-10-CM

## 2023-05-22 NOTE — TELEPHONE ENCOUNTER
Pt called today to say he was told by his insurance that they will approve Wegovy for him with a prior auth  Can Joleen Yee be entered for him to send to his pharmacy please

## 2023-05-31 ENCOUNTER — HOSPITAL ENCOUNTER (OUTPATIENT)
Dept: ULTRASOUND IMAGING | Facility: MEDICAL CENTER | Age: 43
Discharge: HOME | End: 2023-05-31

## 2023-05-31 DIAGNOSIS — N13.2 HYDRONEPHROSIS WITH URINARY OBSTRUCTION DUE TO URETERAL CALCULUS: ICD-10-CM

## 2023-06-02 ENCOUNTER — TELEPHONE (OUTPATIENT)
Dept: FAMILY MEDICINE CLINIC | Facility: CLINIC | Age: 43
End: 2023-06-02

## 2023-06-07 ENCOUNTER — CONSULT (OUTPATIENT)
Dept: NEPHROLOGY | Facility: CLINIC | Age: 43
End: 2023-06-07
Payer: COMMERCIAL

## 2023-06-07 VITALS
HEIGHT: 66 IN | OXYGEN SATURATION: 95 % | WEIGHT: 215 LBS | DIASTOLIC BLOOD PRESSURE: 82 MMHG | BODY MASS INDEX: 34.55 KG/M2 | HEART RATE: 95 BPM | SYSTOLIC BLOOD PRESSURE: 126 MMHG

## 2023-06-07 DIAGNOSIS — R79.89 ELEVATED SERUM CREATININE: Primary | ICD-10-CM

## 2023-06-07 DIAGNOSIS — N20.0 NEPHROLITHIASIS: ICD-10-CM

## 2023-06-07 DIAGNOSIS — E66.09 CLASS 1 OBESITY DUE TO EXCESS CALORIES WITHOUT SERIOUS COMORBIDITY WITH BODY MASS INDEX (BMI) OF 34.0 TO 34.9 IN ADULT: ICD-10-CM

## 2023-06-07 PROCEDURE — 99204 OFFICE O/P NEW MOD 45 MIN: CPT | Performed by: INTERNAL MEDICINE

## 2023-06-07 NOTE — PATIENT INSTRUCTIONS
- get blood work after the visit  - Get 48 hr urine done and start following attached diet and call after 2 weeks to review the results    - Please call me in 10 days after having your blood work done to review the results if you do not hear back from me or my office, as I may have not received the results  - please remember to perform blood work prior to the next visit  - Please call if the blood pressure top number is greater than 140 or less than 110 consistently  - Please call if you are gaining more than 2lbs in 2 days for adjustment of water pills   ~ Please AVOID the following pain medications  LIST OF NSAIDS (NONSTEROIDAL ANTI-INFLAMMATORY DRUGS) AND LO-2 INHIBITORS    DIFLUNISAL (DOLOBID)  IBUPROFEN (MOTRIN, ADVIL)  FLURBIPROFEN (ANSAID)  KETOPROFEN (ORUDIS, ORUVAIL)  FENOPROFEN (NALFON)  NABUMETONE (RELAFEN)  PIROXICAM (FELDENE)  NAPROXEN (ALEVE, NAPROSYN, NAPRELAN, ANAPROX)  DICLOFENAC (VOLTAREN, CATAFLAM)  INDOMETHACIN (INDOCIN)  SULINDAC (CLINORIL)  TOLMETIN (TOLETIN)  ETODOLAC (LODINE)  MELOXICAM (MOBIC)  KETOROLAC (TORADOL)  OXAPROZIN (DAYPRO)  CELECOXIB (CELEBREX)    Things to do to reduce your blood pressure include working with all your physician to do the following:  ~ stop smoking if you smoke  ~ increase cardiovascular exercise like walking and swimming    ~ modify your diet to decrease fat and salt intake  ~ reduce your weight if you are overweight or obese   ~ increase the consumption of fruits, vegetables and whole grains  ~ decrease alcohol consumption if you consume alcohol    ~ try to minimize stress in your life with lifestyle modifications  ~ be compliant with your anti-hypertensive medications  ~ adjust your medications to help improve your vascular stiffness and decrease risks for heart attacks and strokes  Exercise to Lose Weight     Exercise and a healthy diet may help you lose weight  Your doctor may suggest specific exercises       EXERCISE IDEAS AND TIPS     Choose low-cost things you enjoy doing, such as walking, bicycling, or exercising to workout videos  Take stairs instead of the elevator  Walk during your lunch break  Park your car further away from work or school  Go to a gym or an exercise class  Start with 5 to 10 minutes of exercise each day  Build up to 30 minutes of exercise 4 to 6 days a week  Wear shoes with good support and comfortable clothes  Stretch before and after working out  Work out until you breathe harder and your heart beats faster  Drink extra water when you exercise  Do not do so much that you hurt yourself, feel dizzy, or get very short of breath  Exercises that burn about 150 calories:   Running 1 ½ miles in 15 minutes  Playing volleyball for 45 to 60 minutes  Washing and waxing a car for 45 to 60 minutes  Playing touch football for 45 minutes  Walking 1 ¾ miles in 35 minutes  Pushing a stroller 1 ½ miles in 30 minutes  Playing basketball for 30 minutes  Raking leaves for 30 minutes  Bicycling 5 miles in 30 minutes  Walking 2 miles in 30 minutes  Dancing for 30 minutes  Shoveling snow for 15 minutes  Swimming laps for 20 minutes  Walking up stairs for 15 minutes  Bicycling 4 miles in 15 minutes  Gardening for 30 to 45 minutes  Jumping rope for 15 minutes  Washing windows or floors for 45 to 60 minutes

## 2023-06-07 NOTE — PROGRESS NOTES
Nephrology Initial Consultation  Mariaelena Mast 37 y o  male MRN: [id number]            REASON FOR CONSULTATION:  Mariaelena Mast is a 37 y o male who was referred by Akiko Mosley DO for evaluation of Consult and Abnormal Lab    ASSESSMENT / PLAN:   37 y o   male with pmh of multiple co-morbidities including obesity, arthritis and nephrolithiasis presents to the office for evaluation and management of abnormal renal parameters  CKD stage 2/3 versus elevated creatinine:  - After review of records in [facility] as well as Care everywhere patient has a baseline creatinine of 1 2-1 6 mg/dL as far back as 2019  Most recent labs show a Creatinine of 1 40 mg/dL on 5/15/2023  Renal function remains stable  However there could be some element of creatinine elevation from elevated CK levels noted at 1300 on 5/14/2023 and also possibly from obstruction from nephrolithiasis  We will get blood work after the visit  Likely CK elevation from workout/exercise we will recheck CK  -Patient likely has some degree of underestimation of renal function based on current GFR formulation is on his muscular built  We will recheck renal function with creatinine as well as a Cystatin C   -May likely have underlying CKD stage II secondary to nephrolithiasis and obesity related hyperfiltration  -CT abdomen 5/14/2023 mild left hydronephrosis secondary to an obstructing 0 3-minute centimeter calculus in the left ureterovesical junction located within the urinary bladder  -Renal ultrasound from 5/31/2023 showing bilateral kidneys approximately 11 5 cm no masses no hydronephrosis  Resolution of left-sided hydronephrosis  - Acid base and lytes stable except for mild hyponatremia  Recent serum sodium 134 mEq  - Clinically the patient appears to be euvolemic  - Recommend to avoid use of NSAIDs, nephrotoxins   Caution advised with regards to exposure to IV contrast dye    - Discussed with the patient in depth his renal status, including the possible etiologies for CKD  - Advised the patient that when his GFR is close to 20mL/min then will start discussing about RRT(renal replacement therapy) options such as renal transplant, peritoneal dialysis and hemodialysis  - Informed the patient about the various options for Renal Replacement therapy  - Discussed with the patient how we need to work together to delay the progression of CKD with optimal BP control based on their age and co-morbidities and trying to reduce proteinuria by the use of anti-proteinuric agents  Blood pressure:  - Patient is on no medications  - Goal BP of <  130/80 based on age and comorbidities  - Instructed to follow low sodium (2gm)diet  Hemoglobin:  - Goal Hb of 10-12 g/dL  - Most recent labs suggestive of 15 6/dL  -No role for IV iron    CKD-MBD(Mineral Bone Disease): - Based on patients CKD stage following is the goal of therapy  - Maintain calcium phosphorus product of < 55  Proteinuria:  - proteinuria most likely secondary to obesity due to hyperfiltration  - most recent UA from 5/14/2023 positive blood trace leukocytes trace protein  Likely positive blood from nephrolithiasis  -Most recent micro and creatinine ratio 14 as of 5/14/2023  - check protein creatinine ratio  - currently on therapy for proteinuria with no medications    Lipids:  - goal LDL less than 70  - Management as per PCP    Nephrolithiasis:  -Most recent stone analysis from 5/15/2020 3 stone composition 60% calcium oxalate monohydrate   -Status post left uteroscopy with stone extraction on 5/15/2023  -Continue follow-up with urology  - Discussed with the patient that the most common types of kidney stones are calcium oxalate stones  - Advised to follow a diet with increased fluid intake so that urine output is greater than 2-2 5L/day  - Advised patient to decrease salt, protein and oxalate intake  - Dietary handouts given  - Ordered for 48 hr litholink stone workup analysis     - advised patient to call 2 weeks after it is completed to review and discuss the results  - Check ca, ipth, bmp  Nutrition/obesity:  - Encouraged patient to follow a renal diet comprising of moderate potassium, low phosphorus and protein restriction to 0 8gm/kg  Awaiting to receive Wegovy from the pharmacy is on backorder  - Will check serum albumin with next blood work  Followup:  - Patient is to follow-up in 6 months, with lab work to be performed after the visit and then again in a few days prior to the next visit  Advised patient to call me in 10 days to review the results if they do not hear back from me, as I may have not received the results  KRYSTA UNGER MD, [facility], 6/7/2023, 2:39 PM             HISTORY OF PRESENT ILLNESS: 37 y o  male with multiple comorbidity including obesity, arthritis and nephrolithiasis presents to the office for evaluation and management of abnormal renal parameters  Review of record shows patient has a baseline creatinine of 1 2 to 1 6 mg/dL dating as far back as 2019  Most recent labs from 5/15/2023 with a creatinine of 1 40 mg/dL  Status post post hospital visit found to have obstructive uropathy with left hydronephrosis status post left ureteroscopy with stone extraction 5/15/2023  Takes NSAIDs once a month  Only one episode of stone  Drinks about 1-2 quarts of fluids during the day  Does have a high oxalate and high protein diet  Does work out about 5 days a week, still taking testosterone supplements  Does not take protein or creatine supplements  May have seen a nephrologist many years ago 1 time  No history of MICKEY needing dialysis  Thankful for the care information that is gone today  Review of Systems   Constitutional: Negative for chills and fever  HENT: Negative for congestion and sore throat  Respiratory: Negative for cough, shortness of breath and wheezing  Cardiovascular: Negative for leg swelling     Gastrointestinal: Negative for constipation, diarrhea and vomiting  Genitourinary: Negative for difficulty urinating, dysuria and hematuria  Musculoskeletal: Positive for back pain  Chronic   Skin: Negative for wound  Neurological: Negative for dizziness, light-headedness and headaches  Psychiatric/Behavioral: Negative for agitation and confusion  All other systems reviewed and are negative  PAST MEDICAL HISTORY:  Past Medical History:   Diagnosis Date   • Epilepsy ([facility] [address] )     and recurrent seizures, one time age 13   • Kidney stone        PROBLEM LIST    Patient Active Problem List   Diagnosis   • Cervical radiculopathy   • Elbow pain, chronic, left   • Impingement syndrome of left shoulder   • Left lateral epicondylitis   • Myofascial pain   • Right lateral epicondylitis   • Obesity (BMI 30-39  9)   • Cervical spinal stenosis   • Cervical herniated disc   • Hair loss   • Skin neoplasm   • Strain of calf muscle   • Chronic bilateral low back pain without sciatica   • Well adult exam   • Acute non-recurrent maxillary sinusitis   • Viral gastroenteritis   • Right knee pain   • Right calf pain   • Primary osteoarthritis of right knee   • Ruptured Bakers cyst   • Hypopigmentation   • Muscle weakness of right upper extremity   • Hyperpigmentation   • Low TSH level   • Pure hypercholesterolemia   • Hyperglycemia   • Primary insomnia   • Cough   • TMJ (dislocation of temporomandibular joint)   • Tongue lesion   • Epididymitis   • Right testicular pain   • Right hip pain   • COVID-19   • Chronic right shoulder pain   • Primary osteoarthritis of left shoulder   • Impingement syndrome of right shoulder   • Chronic fatigue   • Chronic pain of right knee   • Mass of right upper extremity   • Hydronephrosis   • Elevated serum creatinine   • Nephrolithiasis       PAST SURGICAL HISTORY:  Past Surgical History:   Procedure Laterality Date   • ANTERIOR CRUCIATE LIGAMENT REPAIR Right    • FL RETROGRADE PYELOGRAM  5/15/2023       SOCIAL HISTORY ":   reports that he has never smoked  He has never used smokeless tobacco  He reports current alcohol use  He reports that he does not use drugs  FAMILY HISTORY:  Family History   Problem Relation Age of Onset   • Other Mother         liver transplant       ALLERGIES:  No Known Allergies        PHYSICAL EXAM:  Vitals:    06/07/23 1308 06/07/23 1331   BP: 140/80 126/82   BP Location: Left arm    Patient Position: Sitting    Cuff Size: Adult    Pulse: 95    SpO2: 95%    Weight: 97 5 kg (215 lb)    Height: 5' 6\" (1 676 m)      Body mass index is 34 7 kg/m²  Physical Exam  Vitals reviewed  Constitutional:       General: He is not in acute distress  Appearance: Normal appearance  He is obese  He is not ill-appearing, toxic-appearing or diaphoretic  HENT:      Head: Normocephalic and atraumatic  Mouth/Throat:      Pharynx: Oropharynx is clear  No oropharyngeal exudate  Eyes:      General: No scleral icterus  Conjunctiva/sclera: Conjunctivae normal    Cardiovascular:      Rate and Rhythm: Normal rate  Heart sounds: Normal heart sounds  No friction rub  Pulmonary:      Effort: No respiratory distress  Breath sounds: Normal breath sounds  No stridor  Abdominal:      General: There is no distension  Palpations: Abdomen is soft  There is no mass  Tenderness: There is no abdominal tenderness  There is no right CVA tenderness or left CVA tenderness  Musculoskeletal:         General: No swelling  Cervical back: Normal range of motion  No rigidity  Skin:     General: Skin is warm  Coloration: Skin is not jaundiced  Neurological:      General: No focal deficit present  Mental Status: He is alert and oriented to person, place, and time     Psychiatric:         Mood and Affect: Mood normal          Behavior: Behavior normal            LABORATORY DATA:     Results from last 6 Months   Lab Units 05/15/23  0503 05/14/23  2114   BUN mg/dL 11 12   CALCIUM mg/dL 8 5 " "9  1   CHLORIDE mmol/L 106 106   CO2 mmol/L 29 31   CREATININE mg/dL 1 40* 1 55*   HEMATOCRIT % 45 4 47 7   HEMOGLOBIN g/dL 15 6 15 9   POTASSIUM mmol/L 4 4 4 2   MAGNESIUM mg/dL 2 0  --    PLATELETS Thousands/uL 180 200   WBC Thousand/uL 11 76* 11 76*        rest all reviewed    RADIOLOGY:  No orders to display     Rest all reviewed        MEDICATIONS:    Current Outpatient Medications:   •  Semaglutide-Weight Management (WEGOVY) 0 25 MG/0 5ML, Inject 0 5 mL (0 25 mg total) under the skin once a week (Patient not taking: Reported on 6/7/2023), Disp: 2 mL, Rfl: 1        Portions of the record may have been created with voice recognition software  Occasional wrong word or \"sound a like\" substitutions may have occurred due to the inherent limitations of voice recognition software  Read the chart carefully and recognize, using context, where substitutions have occurred  If you have any questions, please contact the dictating provider        "

## 2023-06-07 NOTE — LETTER
[address] [facility] [place] [id number]  Dept: [phone]    June 7, 2023    Patient: Rosangela Becerril  YOB: 1980    Rosangela Becerril was seen and evaluated at our [place] [address] neph[facility] for abnormal renal parameters and work up for evaluation and management of kidney stones  They may return to work on 6/8/23      Sincerely,    Huy Jones MD

## 2023-06-19 ENCOUNTER — TELEPHONE (OUTPATIENT)
Dept: FAMILY MEDICINE CLINIC | Facility: CLINIC | Age: 43
End: 2023-06-19

## 2023-06-19 DIAGNOSIS — E66.9 OBESITY (BMI 30-39.9): Primary | ICD-10-CM

## 2023-06-19 NOTE — TELEPHONE ENCOUNTER
[facility] is not carrying Wegovy and patient would like to know what other medication he could try  Patient does not have diabetes, using for weight loss only

## 2023-06-27 ENCOUNTER — OFFICE VISIT (OUTPATIENT)
Dept: UROLOGY | Facility: CLINIC | Age: 43
End: 2023-06-27
Payer: COMMERCIAL

## 2023-06-27 VITALS
SYSTOLIC BLOOD PRESSURE: 132 MMHG | BODY MASS INDEX: 34.23 KG/M2 | HEART RATE: 75 BPM | DIASTOLIC BLOOD PRESSURE: 78 MMHG | OXYGEN SATURATION: 98 % | HEIGHT: 66 IN | WEIGHT: 213 LBS

## 2023-06-27 DIAGNOSIS — N20.0 CALCULUS OF KIDNEY: Primary | ICD-10-CM

## 2023-06-27 NOTE — PROGRESS NOTES
"6/27/2023      Chief Complaint   Patient presents with   • Follow-up     Follow up US         Assessment and Plan    37 y o  male managed by Dr Nelida Estrada    1  Left ureterolithiasis    He is well-healed 6 weeks after ureteroscopy  Stent is out  Ultrasound is back to normal, stone free  Discussed stone dietary prevention he does a great job with his water intake over 2 L/day, he does take a high oxalate high-protein diet  He is trying to add some citrate  He will return in 1 year with an ultrasound  Has other lab work for nephrology as ordered  History of Present Illness  Key Zarate is a 37 y o  male here for evaluation of postop stone follow-up  Underwent left ureteroscopy with stone extraction @ left UVJ in May  Stent on string removed a few days later  Here today with an updated ultrasound showing resolved left-sided hydronephrosis and stone free  Stone analysis 60% calcium oxalate monohydrate  He has no complaints  Started seeing nephrology recently for some baseline CKD2-3 with baseline creatinine 1 2-1 6  He has a 24 hr urine and other labs coming up  Review of Systems   Constitutional: Negative  Respiratory: Negative  Cardiovascular: Negative  Genitourinary: Negative for decreased urine volume, difficulty urinating, dysuria, flank pain, frequency, hematuria, scrotal swelling, testicular pain and urgency  Musculoskeletal: Negative  Vitals  Vitals:    06/27/23 0841   BP: 132/78   BP Location: Left arm   Patient Position: Sitting   Cuff Size: Adult   Pulse: 75   SpO2: 98%   Weight: 96 6 kg (213 lb)   Height: 5' 6\" (1 676 m)       Physical Exam  Vitals and nursing note reviewed  Constitutional:       General: He is not in acute distress  Appearance: He is well-developed  He is not diaphoretic  HENT:      Head: Normocephalic and atraumatic  Pulmonary:      Effort: Pulmonary effort is normal    Skin:     General: Skin is warm and dry     Neurological:      Mental " Status: He is alert and oriented to person, place, and time  Gait: Gait normal    Psychiatric:         Speech: Speech normal          Behavior: Behavior normal            Past History  Past Medical History:   Diagnosis Date   • Epilepsy ([address] )     and recurrent seizures, one time age 13   • Kidney stone      Social History     Socioeconomic History   • Marital status: /Civil Union     Spouse name: None   • Number of children: None   • Years of education: None   • Highest education level: None   Occupational History     Employer: [employer]   Tobacco Use   • Smoking status: Never   • Smokeless tobacco: Never   Vaping Use   • Vaping Use: Never used   Substance and Sexual Activity   • Alcohol use: Yes     Comment: socially   • Drug use: Never   • Sexual activity: Yes     Partners: Female   Other Topics Concern   • None   Social History Narrative   • None     Social Determinants of Health     Financial Resource Strain: Low Risk  (2/19/2021)    Overall Financial Resource Strain (CARDIA)    • Difficulty of Paying Living Expenses: Not hard at all   Food Insecurity: No Food Insecurity (2/19/2021)    Hunger Vital Sign    • Worried About Running Out of Food in the Last Year: Never true    • Ran Out of Food in the Last Year: Never true   Transportation Needs: No Transportation Needs (2/19/2021)    PRAPARE - Transportation    • Lack of Transportation (Medical): No    • Lack of Transportation (Non-Medical):  No   Physical Activity: Sufficiently Active (11/2/2020)    Exercise Vital Sign    • Days of Exercise per Week: 7 days    • Minutes of Exercise per Session: 60 min   Stress: No Stress Concern Present (11/2/2020)    [address]    • Feeling of Stress : Not at all   Social Connections: Socially Integrated (11/2/2020)    Social Connection and Isolation Panel [NHANES]    • Frequency of Communication with Friends and Family: More than three times a week    • Frequency of Social Gatherings with Friends and Family: More than three times a week    • Attends Mandaeism Services: 1 to 4 times per year    • Active Member of Clubs or Organizations:  Yes    • Attends Club or Organization Meetings: 1 to 4 times per year    • Marital Status:    Intimate Partner Violence: Not At Risk (11/2/2020)    Humiliation, Afraid, Rape, and Kick questionnaire    • Fear of Current or Ex-Partner: No    • Emotionally Abused: No    • Physically Abused: No    • Sexually Abused: No   Housing Stability: Not on file     Social History     Tobacco Use   Smoking Status Never   Smokeless Tobacco Never     Family History   Problem Relation Age of Onset   • Other Mother         liver transplant       The following portions of the patient's history were reviewed and updated as appropriate: allergies, current medications, past medical history, past social history, past surgical history and problem list     Results  No results found for this or any previous visit (from the past 1 hour(s)) ]  Lab Results   Component Value Date    PSA 0 9 10/26/2021     Lab Results   Component Value Date    CALCIUM 8 5 05/15/2023    K 4 4 05/15/2023    CO2 29 05/15/2023     05/15/2023    BUN 11 05/15/2023    CREATININE 1 40 (H) 05/15/2023     Lab Results   Component Value Date    WBC 11 76 (H) 05/15/2023    HGB 15 6 05/15/2023    HCT 45 4 05/15/2023    MCV 97 05/15/2023     05/15/2023

## 2023-08-23 ENCOUNTER — OFFICE VISIT (OUTPATIENT)
Dept: FAMILY MEDICINE CLINIC | Facility: CLINIC | Age: 43
End: 2023-08-23
Payer: COMMERCIAL

## 2023-08-23 VITALS
HEART RATE: 69 BPM | WEIGHT: 210 LBS | BODY MASS INDEX: 33.75 KG/M2 | TEMPERATURE: 98.4 F | SYSTOLIC BLOOD PRESSURE: 112 MMHG | HEIGHT: 66 IN | DIASTOLIC BLOOD PRESSURE: 80 MMHG | RESPIRATION RATE: 21 BRPM | OXYGEN SATURATION: 98 %

## 2023-08-23 DIAGNOSIS — L82.1 SEBORRHEIC KERATOSIS: Primary | ICD-10-CM

## 2023-08-23 DIAGNOSIS — R59.0 DISCRETE LYMPH NODE: ICD-10-CM

## 2023-08-23 PROCEDURE — 99213 OFFICE O/P EST LOW 20 MIN: CPT | Performed by: FAMILY MEDICINE

## 2023-08-23 NOTE — PROGRESS NOTES
Assessment/Plan: Guidance given regarding lymph node right posterior cervical region. Patient will observe at this time as it is freely mobile as well as smooth and 4 to 5 mm. If enlargement occurs CAT scan and further work-up will be done. Patient will observe seborrheic keratosis. Guidance given. Follow-up as needed       Diagnoses and all orders for this visit:    Seborrheic keratosis    Discrete lymph node            Subjective:        Patient ID: Wayne Rinne is a 37 y.o. male. Patient is here with subcutaneous mass right neck noticed 2 days ago. No tender. No discharge or fevers or chills associated with this. Patient also with some lesions on his back that he wishes to have checked. No significant URI symptoms or allergy symptoms. No other adenopathy noted in the axilla or inguinal regions. The following portions of the patient's history were reviewed and updated as appropriate: allergies, current medications, past family history, past medical history, past social history, past surgical history and problem list.      Review of Systems   Constitutional: Negative. HENT: Negative. Eyes: Negative. Respiratory: Negative. Cardiovascular: Negative. Gastrointestinal: Negative. Endocrine: Negative. Genitourinary: Negative. Musculoskeletal: Positive for neck pain and neck stiffness. Skin: Positive for color change. Allergic/Immunologic: Negative. Neurological: Negative. Hematological: Negative. Psychiatric/Behavioral: Negative. Objective:        Depression Screening and Follow-up Plan: Patient was screened for depression during today's encounter.  They screened negative with a PHQ-2 score of 0.            /80 (BP Location: Right arm, Patient Position: Sitting, Cuff Size: Large)   Pulse 69   Temp 98.4 °F (36.9 °C) (Temporal)   Resp 21   Ht 5' 6" (1.676 m)   Wt 95.3 kg (210 lb)   SpO2 98%   BMI 33.89 kg/m²          Physical Exam  Vitals and nursing note reviewed. Constitutional:       General: He is not in acute distress. Appearance: Normal appearance. He is not ill-appearing, toxic-appearing or diaphoretic. HENT:      Head: Normocephalic and atraumatic. Right Ear: Tympanic membrane, ear canal and external ear normal. There is no impacted cerumen. Left Ear: Tympanic membrane, ear canal and external ear normal. There is no impacted cerumen. Nose: Nose normal. No congestion or rhinorrhea. Mouth/Throat:      Mouth: Mucous membranes are moist.      Pharynx: No oropharyngeal exudate or posterior oropharyngeal erythema. Eyes:      General: No scleral icterus. Neck:      Vascular: No carotid bruit. Comments: Small freely movable around 4 to 5 mm lymph node noted in the right posterior cervical region. Musculoskeletal:         General: No swelling, tenderness, deformity or signs of injury. Normal range of motion. Cervical back: Normal range of motion and neck supple. No rigidity. No muscular tenderness. Right lower leg: No edema. Left lower leg: No edema. Lymphadenopathy:      Cervical: No cervical adenopathy. Skin:     General: Skin is warm and dry. Capillary Refill: Capillary refill takes less than 2 seconds. Coloration: Skin is not jaundiced. Findings: Lesion present. No bruising, erythema or rash. Comments: Seborrheic keratosis left posterior shoulder region. Some lentigos as well as capillary meningiomas noted. Neurological:      Mental Status: He is alert and oriented to person, place, and time. Mental status is at baseline. Cranial Nerves: No cranial nerve deficit. Sensory: No sensory deficit. Motor: No weakness. Coordination: Coordination normal.      Gait: Gait normal.   Psychiatric:         Mood and Affect: Mood normal.         Behavior: Behavior normal.         Thought Content:  Thought content normal.         Judgment: Judgment normal.

## 2023-09-21 NOTE — ED ATTENDING ATTESTATION
5/14/2023  IRoman MD, saw and evaluated the patient  I have discussed the patient with the resident/non-physician practitioner and agree with the resident's/non-physician practitioner's findings, Plan of Care, and MDM as documented in the resident's/non-physician practitioner's note, except where noted  All available labs and Radiology studies were reviewed  I was present for key portions of any procedure(s) performed by the resident/non-physician practitioner and I was immediately available to provide assistance  At this point I agree with the current assessment done in the Emergency Department  I have conducted an independent evaluation of this patient a history and physical is as follows:    ED Course       47-year-old male presents with left-sided flank and upper abdominal pain  Patient states pain is severe is feeling some is stabbing him underneath his ribs  Denies any shortness of breath chest pain palpitations does admit to some dark-colored urine  Patient does admit to frequent exercise use  Denies pulling his muscle in the gym the day before  Pain is colicky in nature denies fevers or chills  Vitals reviewed  Patient appears acutely uncomfortable pacing in room secondary to pain  Abdomen is soft with left upper quadrant CVA tenderness to palpation no guarding or rebound noted  Impression left flank pain    Differential diagnosis: Musculoskeletal causes, renal colic, ureteral colic, pyelonephritis, doubt renal infarct    Plan to check CT ab pelvis without contrast CBC CMP CK lipase urinalysis    We will treat with IV fluids IV Dilaudid for pain reassess    Labs reviewed CBC remarkable for a mild leukocytosis may be reactive  CMP remarkable for elevated transaminase and elevated creatinine concerning for MICKEY versus mild CKD had prior elevated creatinine  Urinalysis remarkable for trace leukocytes moderate occult blood no bacteria seen on microscopy    CK mildly elevated    CT abdomen pelvis without contrast reviewed report: Mild left hydronephrosis secondary to obstructing 0 3 cm calculus at left UVJ    Patient reassessed still with persistent pain despite escalating doses of IV Dilaudid will make patient to medicine for intractable pain for urology evaluation possible operative procedure          Critical Care Time  Procedures no

## 2023-11-25 ENCOUNTER — OFFICE VISIT (OUTPATIENT)
Dept: URGENT CARE | Age: 43
End: 2023-11-25
Payer: COMMERCIAL

## 2023-11-25 VITALS
TEMPERATURE: 97.8 F | OXYGEN SATURATION: 96 % | DIASTOLIC BLOOD PRESSURE: 74 MMHG | SYSTOLIC BLOOD PRESSURE: 139 MMHG | RESPIRATION RATE: 18 BRPM | HEART RATE: 77 BPM

## 2023-11-25 DIAGNOSIS — J06.9 BACTERIAL URI: Primary | ICD-10-CM

## 2023-11-25 DIAGNOSIS — B96.89 BACTERIAL URI: Primary | ICD-10-CM

## 2023-11-25 LAB
SARS-COV-2 AG UPPER RESP QL IA: NEGATIVE
VALID CONTROL: NORMAL

## 2023-11-25 PROCEDURE — 87811 SARS-COV-2 COVID19 W/OPTIC: CPT

## 2023-11-25 PROCEDURE — 99213 OFFICE O/P EST LOW 20 MIN: CPT

## 2023-11-25 RX ORDER — AZITHROMYCIN 250 MG/1
TABLET, FILM COATED ORAL
Qty: 6 TABLET | Refills: 0 | Status: SHIPPED | OUTPATIENT
Start: 2023-11-25 | End: 2023-11-29

## 2023-11-25 RX ORDER — FLUTICASONE PROPIONATE 50 MCG
1 SPRAY, SUSPENSION (ML) NASAL DAILY
Qty: 15.8 ML | Refills: 0 | Status: SHIPPED | OUTPATIENT
Start: 2023-11-25

## 2023-11-25 NOTE — PATIENT INSTRUCTIONS
Use flonase as directed. Take antibiotic as prescribed. Recommend probiotic use while taking antibiotic. Acetaminophen or ibuprofen for fever and pain. Follow-up with PCP in 3-5 days. Go to ER if symptoms worsen.

## 2023-11-25 NOTE — PROGRESS NOTES
[facility]        NAME: Mervat Guerrerokeeper is a 37 y.o. male  : 1980    MRN: [id number]  DATE: 2023  TIME: 2:57 PM      Assessment and Plan     Bacterial URI [J06.9, B96.89]  1. Bacterial URI  Poct Covid 19 Rapid Antigen Test    fluticasone (FLONASE) 50 mcg/act nasal spray    azithromycin (ZITHROMAX) 250 mg tablet        Rapid covid negative. Patient Instructions     Use flonase as directed. Take antibiotic as prescribed. Recommend probiotic use while taking antibiotic. Acetaminophen or ibuprofen for fever and pain. Follow-up with PCP in 3-5 days. Go to ER if symptoms worsen. Chief Complaint     Chief Complaint   Patient presents with    Cold Like Symptoms     Pt presents with cold like symptoms that started three days ago. Pt c/o chest tightness, sinus pressure, and suspects an ear infection. Pt says he gets this kind of cold every year at the start of winter. Pt is taking musinex for his cold pt says it is not working          History of Present Illness     Patient is a 66-year-old male with cough and congestion for 3 days. Reports associated ear pain and sinus pain/pressure. Reports he does think he has sleep apnea and never picked up his CPAP. States he has been trying Mucinex and history. Denies asthma        Review of Systems     Review of Systems   Constitutional:  Negative for chills and fever. HENT:  Positive for congestion, ear pain, postnasal drip, sinus pressure and sinus pain. Respiratory:  Positive for cough. Gastrointestinal:  Negative for diarrhea and vomiting. All other systems reviewed and are negative.         Current Medications       Current Outpatient Medications:     azithromycin (ZITHROMAX) 250 mg tablet, Take 2 tablets today then 1 tablet daily x 4 days, Disp: 6 tablet, Rfl: 0    fluticasone (FLONASE) 50 mcg/act nasal spray, 1 spray into each nostril daily, Disp: 15.8 mL, Rfl: 0    Current Allergies     Allergies as of 2023    (No Known Allergies)              The following portions of the patient's history were reviewed and updated as appropriate: allergies, current medications, past family history, past medical history, past social history, past surgical history and problem list.     Past Medical History:   Diagnosis Date    Epilepsy ([address])     and recurrent seizures, one time age 13    Kidney stone        Past Surgical History:   Procedure Laterality Date    ANTERIOR CRUCIATE LIGAMENT REPAIR Right     FL RETROGRADE PYELOGRAM  5/15/2023    FL CYSTO BLADDER W/URETERAL CATHETERIZATION Left 5/15/2023    Procedure: CYSTOSCOPY RETROGRADE PYELOGRAM WITH URETEROSCOPY, STONE EXTRACTION,  INSERTION STENT URETERAL LEFT;  Surgeon: Shaye Garcia MD;  Location: [facility];  Service: Urology       Family History   Problem Relation Age of Onset    Other Mother         liver transplant         Medications have been verified. Objective     /74 (BP Location: Left arm, Patient Position: Sitting)   Pulse 77   Temp 97.8 °F (36.6 °C) (Temporal)   Resp 18   SpO2 96%   No LMP for male patient. Physical Exam     Physical Exam  Vitals and nursing note reviewed. Constitutional:       General: He is not in acute distress. Appearance: Normal appearance. He is not ill-appearing, toxic-appearing or diaphoretic. HENT:      Right Ear: Tympanic membrane, ear canal and external ear normal.      Left Ear: Tympanic membrane, ear canal and external ear normal.      Nose: Congestion present. Mouth/Throat:      Lips: Pink. Mouth: Mucous membranes are moist.      Pharynx: Oropharynx is clear. Uvula midline. No oropharyngeal exudate or posterior oropharyngeal erythema. Cardiovascular:      Rate and Rhythm: Normal rate. Pulses: Normal pulses. Heart sounds: Normal heart sounds, S1 normal and S2 normal.   Pulmonary:      Effort: Pulmonary effort is normal.      Breath sounds: Normal breath sounds and air entry.  No stridor, decreased air movement or transmitted upper airway sounds. No decreased breath sounds, wheezing, rhonchi or rales. Skin:     General: Skin is warm. Capillary Refill: Capillary refill takes less than 2 seconds. Neurological:      General: No focal deficit present. Mental Status: He is alert. Psychiatric:         Mood and Affect: Mood normal.         Behavior: Behavior normal.         Thought Content:  Thought content normal.         Judgment: Judgment normal.

## 2023-12-07 ENCOUNTER — TELEPHONE (OUTPATIENT)
Dept: NEPHROLOGY | Facility: CLINIC | Age: 43
End: 2023-12-07

## 2023-12-07 NOTE — TELEPHONE ENCOUNTER
Called and spoke with [employer] to complete their bloodwork prior to their appointment on 12/13 with Dr. Bel Doty at the [facility].

## 2023-12-13 ENCOUNTER — APPOINTMENT (OUTPATIENT)
Dept: LAB | Facility: HOSPITAL | Age: 43
End: 2023-12-13
Payer: COMMERCIAL

## 2023-12-13 ENCOUNTER — OFFICE VISIT (OUTPATIENT)
Dept: NEPHROLOGY | Facility: CLINIC | Age: 43
End: 2023-12-13
Payer: COMMERCIAL

## 2023-12-13 VITALS
DIASTOLIC BLOOD PRESSURE: 72 MMHG | SYSTOLIC BLOOD PRESSURE: 110 MMHG | WEIGHT: 207 LBS | HEIGHT: 66 IN | BODY MASS INDEX: 33.27 KG/M2 | HEART RATE: 75 BPM

## 2023-12-13 DIAGNOSIS — E78.00 PURE HYPERCHOLESTEROLEMIA: ICD-10-CM

## 2023-12-13 DIAGNOSIS — N20.0 NEPHROLITHIASIS: ICD-10-CM

## 2023-12-13 DIAGNOSIS — N18.31 STAGE 3A CHRONIC KIDNEY DISEASE (HCC): Primary | ICD-10-CM

## 2023-12-13 DIAGNOSIS — E66.09 CLASS 1 OBESITY DUE TO EXCESS CALORIES WITHOUT SERIOUS COMORBIDITY WITH BODY MASS INDEX (BMI) OF 34.0 TO 34.9 IN ADULT: ICD-10-CM

## 2023-12-13 DIAGNOSIS — R79.89 ELEVATED SERUM CREATININE: ICD-10-CM

## 2023-12-13 DIAGNOSIS — N18.2 CKD (CHRONIC KIDNEY DISEASE) STAGE 2, GFR 60-89 ML/MIN: ICD-10-CM

## 2023-12-13 DIAGNOSIS — E66.9 OBESITY (BMI 30-39.9): ICD-10-CM

## 2023-12-13 LAB
ALBUMIN SERPL BCP-MCNC: 4.3 G/DL (ref 3.5–5)
ANION GAP SERPL CALCULATED.3IONS-SCNC: 6 MMOL/L
BUN SERPL-MCNC: 21 MG/DL (ref 5–25)
CALCIUM SERPL-MCNC: 9.8 MG/DL (ref 8.4–10.2)
CHLORIDE SERPL-SCNC: 101 MMOL/L (ref 96–108)
CO2 SERPL-SCNC: 33 MMOL/L (ref 21–32)
CREAT SERPL-MCNC: 1.54 MG/DL (ref 0.6–1.3)
CREAT UR-MCNC: 194.4 MG/DL
GFR SERPL CREATININE-BSD FRML MDRD: 53 ML/MIN/1.73SQ M
GLUCOSE P FAST SERPL-MCNC: 111 MG/DL (ref 65–99)
MICROALBUMIN UR-MCNC: 9.3 MG/L
MICROALBUMIN/CREAT 24H UR: 5 MG/G CREATININE (ref 0–30)
PHOSPHATE SERPL-MCNC: 2.9 MG/DL (ref 2.7–4.5)
POTASSIUM SERPL-SCNC: 5.2 MMOL/L (ref 3.5–5.3)
PTH-INTACT SERPL-MCNC: 36.6 PG/ML (ref 12–88)
SODIUM SERPL-SCNC: 140 MMOL/L (ref 135–147)

## 2023-12-13 PROCEDURE — 82043 UR ALBUMIN QUANTITATIVE: CPT

## 2023-12-13 PROCEDURE — 99214 OFFICE O/P EST MOD 30 MIN: CPT | Performed by: INTERNAL MEDICINE

## 2023-12-13 PROCEDURE — 82570 ASSAY OF URINE CREATININE: CPT

## 2023-12-13 PROCEDURE — 36415 COLL VENOUS BLD VENIPUNCTURE: CPT

## 2023-12-13 PROCEDURE — 80069 RENAL FUNCTION PANEL: CPT

## 2023-12-13 PROCEDURE — 83970 ASSAY OF PARATHORMONE: CPT

## 2023-12-13 NOTE — PROGRESS NOTES
Nephrology Follow up Consultation  Konstantin Hernandez 37 y.o. male MRN: [id number]   ? BACKGROUND:  Konstantin Hernandez is a 37 y.o.male who was referred by Sandy Ramirez DO for evaluation of Chronic Kidney Disease and Follow-up  . ASSESSMENT / PLAN:   37 y.o.  male with pmh of multiple co-morbidities including obesity, arthritis and nephrolithiasis presents to the office for routine follow-up . CKD stage 2/3 A:  - After review of records in [facility] as well as Care everywhere patient has a baseline creatinine of 1.2-1.6 mg/dL as far back as 2019. Most recent labs show a Creatinine of 1.54 mg/dL on 12/13/23. Renal function remains stable. Check blood work in 6 months  -Patient likely has some degree of underestimation of renal function based on current GFR formulation is on his muscular built. Unfortunately Cystatin C not checked despite being ordered we will hold off for now  - likely has underlying CKD stage II secondary to nephrolithiasis and obesity related hyperfiltration  -CT abdomen 5/14/2023 mild left hydronephrosis secondary to an obstructing 0.3-minute centimeter calculus in the left ureterovesical junction located within the urinary bladder  -Renal ultrasound from 5/31/2023 showing bilateral kidneys approximately 11.5 cm no masses no hydronephrosis. Resolution of left-sided hydronephrosis. - Acid base and lytes stable except for borderline elevated potassium levels most recent potassium 5.4 mEq advised appropriate diet. - Clinically the patient appears to be euvolemic  - Recommend to avoid use of NSAIDs, nephrotoxins. Caution advised with regards to exposure to IV contrast dye.   - Discussed with the patient in depth his renal status, including the possible etiologies for CKD. - Advised the patient that when his GFR is close to 20mL/min then will start discussing about RRT(renal replacement therapy) options such as renal transplant, peritoneal dialysis and hemodialysis.    - Informed the patient about the various options for Renal Replacement therapy. - Discussed with the patient how we need to work together to delay the progression of CKD with optimal BP control based on their age and co-morbidities and trying to reduce proteinuria by the use of anti-proteinuric agents. Blood pressure:  BP Readings from Last 3 Encounters:   12/13/23 110/72   11/25/23 139/74   08/23/23 112/80     - Patient is on no medications. - Goal BP of <  130/80 based on age and comorbidities  - Instructed to follow low sodium (2gm)diet. Hemoglobin:  - Goal Hb of 10-12 g/dL  - Most recent labs suggestive of 15.6/dL. -No role for IV iron    CKD-MBD(Mineral Bone Disease): - Based on patients CKD stage following is the goal of therapy. - Maintain calcium phosphorus product of < 55.  -Most recent intact PTH 36.6, vitamin D pending  -Contact PTH and vitamin D in 6 months and prior to next visit    Proteinuria:  - proteinuria most likely secondary to obesity due to hyperfiltration  - most recent UA from 5/14/2023 positive blood trace leukocytes trace protein. Likely positive blood from nephrolithiasis  -Most recent micro and creatinine ratio 5 as of 12/13/23  - check microalbumin creatinine ratio prior to next visit  - currently on therapy for proteinuria with no medications    Lipids:  - goal LDL less than 70  - Management as per PCP    Nephrolithiasis:  -Most recent stone analysis from 5/15/2020 3 stone composition 60% calcium oxalate monohydrate.  -Status post left uteroscopy with stone extraction on 5/15/2023  -Continue follow-up with urology last seen 6/27/2023 notes reviewed  - Discussed with the patient that the most common types of kidney stones are calcium oxalate stones. - Advised to follow a diet with increased fluid intake so that urine output is greater than 2-2.5L/day. - Advised patient to decrease salt, protein and oxalate intake. - Dietary handouts given.   - reOrdered for 24 hr litholink stone workup analysis at last visit still not completed encourage patient to complete it   - advised patient to call 2 weeks after it is completed to review and discuss the results    Nutrition/obesity:  - Encouraged patient to follow a renal diet comprising of moderate potassium, low phosphorus and protein restriction to 0.8gm/kg. Awaiting to receive Wegovy from the pharmacy is on backorder  - Will check serum albumin with next blood work. Followup:  - Patient is to follow-up in 12 months, with lab work to be performed  in 6 months a few days prior to the next visit. Advised patient to call me in 10 days to review the results if they do not hear back from me, as I may have not received the results. Triny Pearce MD, [facility], 12/13/2023, 12:54 PM             SUBJECTIVE: 37 y.o. male presents to the office for routine follow-up. Still on testosterone therapy, reports he will go off of it soon. Unfortunately with moving him forgot to do 24 urine testing reports she will get it done prior to next visit. No recent hospitalization no NSAID use thankful to care information that is gone today not taking any additional protein or creatine supplements. Following up with his PCP not checking blood pressures at home appetite parameters are all stable no recent stone episodes trying to drink more fluids. Review of Systems   Constitutional:  Negative for chills and fever. HENT:  Positive for rhinorrhea. Negative for congestion and sore throat. Respiratory:  Negative for cough, shortness of breath and wheezing. Cardiovascular:  Negative for leg swelling. Gastrointestinal:  Negative for constipation and diarrhea. Genitourinary:  Negative for difficulty urinating, dysuria and hematuria. Musculoskeletal:  Negative for back pain. Neurological:  Negative for dizziness, light-headedness and headaches. Psychiatric/Behavioral:  Negative for agitation and confusion.     All other systems reviewed and are negative. PAST MEDICAL HISTORY:  Past Medical History:   Diagnosis Date   • Epilepsy ([address])     and recurrent seizures, one time age 13   • Kidney stone        PROBLEM LIST    Patient Active Problem List   Diagnosis   • Cervical radiculopathy   • Elbow pain, chronic, left   • Impingement syndrome of left shoulder   • Left lateral epicondylitis   • Myofascial pain   • Right lateral epicondylitis   • Obesity (BMI 30-39. 9)   • Cervical spinal stenosis   • Cervical herniated disc   • Hair loss   • Skin neoplasm   • Strain of calf muscle   • Chronic bilateral low back pain without sciatica   • Well adult exam   • Acute non-recurrent maxillary sinusitis   • Viral gastroenteritis   • Right knee pain   • Right calf pain   • Primary osteoarthritis of right knee   • Ruptured Bakers cyst   • Hypopigmentation   • Muscle weakness of right upper extremity   • Hyperpigmentation   • Low TSH level   • Pure hypercholesterolemia   • Hyperglycemia   • Primary insomnia   • Cough   • TMJ (dislocation of temporomandibular joint)   • Tongue lesion   • Epididymitis   • Right testicular pain   • Right hip pain   • COVID-19   • Chronic right shoulder pain   • Primary osteoarthritis of left shoulder   • Impingement syndrome of right shoulder   • Chronic fatigue   • Chronic pain of right knee   • Mass of right upper extremity   • Hydronephrosis   • Elevated serum creatinine   • Nephrolithiasis   • Seborrheic keratosis   • Discrete lymph node   • CKD (chronic kidney disease) stage 2, GFR 60-89 ml/min   • Stage 3a chronic kidney disease (HCC)       PAST SURGICAL HISTORY:  Past Surgical History:   Procedure Laterality Date   • ANTERIOR CRUCIATE LIGAMENT REPAIR Right    • FL RETROGRADE PYELOGRAM  5/15/2023   • ME CYSTO BLADDER W/URETERAL CATHETERIZATION Left 5/15/2023    Procedure: CYSTOSCOPY RETROGRADE PYELOGRAM WITH URETEROSCOPY, STONE EXTRACTION,  INSERTION STENT URETERAL LEFT;  Surgeon: Padma Galaviz MD;  Location: [facility]; Service: Urology       SOCIAL HISTORY :   reports that he has never smoked. He has never been exposed to tobacco smoke. He has never used smokeless tobacco. He reports current alcohol use. He reports that he does not use drugs. FAMILY HISTORY:  Family History   Problem Relation Age of Onset   • Other Mother         liver transplant       ALLERGIES:  No Known Allergies        PHYSICAL EXAM:  Vitals:    12/13/23 1222 12/13/23 1237   BP: 140/76 110/72   BP Location: Left arm    Patient Position: Sitting    Cuff Size: Large    Pulse: 75    Weight: 93.9 kg (207 lb)    Height: 5' 6" (1.676 m)      Body mass index is 33.41 kg/m². Physical Exam  Vitals reviewed. Constitutional:       General: He is not in acute distress. Appearance: Normal appearance. He is obese. He is not ill-appearing, toxic-appearing or diaphoretic. HENT:      Head: Normocephalic and atraumatic. Mouth/Throat:      Mouth: Mucous membranes are moist.      Pharynx: No oropharyngeal exudate. Eyes:      General: No scleral icterus. Cardiovascular:      Rate and Rhythm: Normal rate. Pulmonary:      Effort: No respiratory distress. Breath sounds: Normal breath sounds. No stridor. Abdominal:      Palpations: Abdomen is soft. There is no mass. Tenderness: There is no abdominal tenderness. There is no right CVA tenderness or left CVA tenderness. Musculoskeletal:         General: No swelling. Cervical back: Normal range of motion. No rigidity. Skin:     Coloration: Skin is not jaundiced. Neurological:      General: No focal deficit present. Mental Status: He is alert and oriented to person, place, and time.    Psychiatric:         Mood and Affect: Mood normal.         Behavior: Behavior normal.         LABORATORY DATA:     Results from last 6 Months   Lab Units 12/13/23  0642   POTASSIUM mmol/L 5.2   CHLORIDE mmol/L 101   CO2 mmol/L 33*   BUN mg/dL 21   CREATININE mg/dL 1.54*   CALCIUM mg/dL 9.8   PHOSPHORUS mg/dL 2.9          rest all reviewed    RADIOLOGY:  No orders to display     Rest all reviewed        MEDICATIONS:  No current outpatient medications on file. Portions of the record may have been created with voice recognition software. Occasional wrong word or "sound a like" substitutions may have occurred due to the inherent limitations of voice recognition software. Read the chart carefully and recognize, using context, where substitutions have occurred. If you have any questions, please contact the dictating provider.

## 2024-02-21 PROBLEM — J01.00 ACUTE NON-RECURRENT MAXILLARY SINUSITIS: Status: RESOLVED | Noted: 2019-04-02 | Resolved: 2024-02-21

## 2024-02-21 PROBLEM — Z00.00 WELL ADULT EXAM: Status: RESOLVED | Noted: 2019-01-25 | Resolved: 2024-02-21

## 2024-02-21 PROBLEM — R05.9 COUGH: Status: RESOLVED | Noted: 2020-11-02 | Resolved: 2024-02-21

## 2024-02-21 PROBLEM — A08.4 VIRAL GASTROENTERITIS: Status: RESOLVED | Noted: 2019-07-09 | Resolved: 2024-02-21

## 2024-03-05 ENCOUNTER — TELEPHONE (OUTPATIENT)
Age: 44
End: 2024-03-05

## 2024-03-05 NOTE — TELEPHONE ENCOUNTER
Pt was calling about Wegovy.  Stated he had a script and was unable to get it filled because out of stock.      I do not see on medication list.  Previous messages in 2023 stated he needed authorization (end 1/2/2024).    Please advise [phone] if this can be reinitiated.

## 2024-03-06 ENCOUNTER — OFFICE VISIT (OUTPATIENT)
Dept: FAMILY MEDICINE CLINIC | Facility: CLINIC | Age: 44
End: 2024-03-06
Payer: COMMERCIAL

## 2024-03-06 VITALS
DIASTOLIC BLOOD PRESSURE: 78 MMHG | TEMPERATURE: 98.3 F | OXYGEN SATURATION: 98 % | BODY MASS INDEX: 33.46 KG/M2 | HEART RATE: 70 BPM | WEIGHT: 208.2 LBS | SYSTOLIC BLOOD PRESSURE: 114 MMHG | HEIGHT: 66 IN

## 2024-03-06 DIAGNOSIS — F51.01 PRIMARY INSOMNIA: ICD-10-CM

## 2024-03-06 DIAGNOSIS — M70.21 OLECRANON BURSITIS OF BOTH ELBOWS: Primary | ICD-10-CM

## 2024-03-06 DIAGNOSIS — M70.22 OLECRANON BURSITIS OF BOTH ELBOWS: Primary | ICD-10-CM

## 2024-03-06 DIAGNOSIS — E66.9 OBESITY (BMI 30-39.9): ICD-10-CM

## 2024-03-06 PROBLEM — M70.20 OLECRANON BURSITIS: Status: ACTIVE | Noted: 2024-03-06

## 2024-03-06 PROCEDURE — 99214 OFFICE O/P EST MOD 30 MIN: CPT | Performed by: FAMILY MEDICINE

## 2024-03-06 NOTE — PROGRESS NOTES
Assessment/Plan: Informed consent obtained.  Sterile conditions were bursa injection on the right elbow.  Patient go for x-rays.  Patient may use ice and ibuprofen.  Patient may use Tylenol PM at night for insomnia.  Patient may need to see sleep specialist in the future.  Patient will start semaglutide as directed.  Follow-up if not seeing improvement       Diagnoses and all orders for this visit:    Olecranon bursitis of both elbows  -     XR elbow 3+ vw left; Future  -     XR elbow 3+ vw right; Future    Primary insomnia    Obesity (BMI 30-39.9)  -     Discontinue: Semaglutide-Weight Management (WEGOVY) 0.5 MG/0.5ML; Inject 0.5 mL (0.5 mg total) under the skin once a week  -     Semaglutide-Weight Management (WEGOVY) 0.5 MG/0.5ML; Inject 0.5 mL (0.5 mg total) under the skin once a week            Subjective:        Patient ID: Tony Corey is a 44 y.o. male.      Patient is here with pain over the elbow/olecranon's for the past 6 months.  No recent trauma.  Patient also with fatigue and waking up in the morning.  Patient with insomnia.  Patient is using Tylenol PM with good results.          The following portions of the patient's history were reviewed and updated as appropriate: allergies, current medications, past family history, past medical history, past social history, past surgical history and problem list.      Review of Systems   Constitutional: Negative.    HENT: Negative.     Eyes: Negative.    Respiratory: Negative.     Cardiovascular: Negative.    Gastrointestinal: Negative.    Endocrine: Negative.    Genitourinary: Negative.    Musculoskeletal:  Positive for arthralgias.   Skin: Negative.    Allergic/Immunologic: Negative.    Neurological: Negative.    Hematological: Negative.    Psychiatric/Behavioral:  Positive for sleep disturbance.            Objective:        Depression Screening and Follow-up Plan: Clincally patient does not have depression. No treatment is required.             /78 (BP  "Location: Left arm, Patient Position: Sitting, Cuff Size: Large)   Pulse 70   Temp 98.3 °F (36.8 °C) (Temporal)   Ht 5' 6\" (1.676 m)   Wt 94.4 kg (208 lb 3.2 oz)   SpO2 98%   BMI 33.60 kg/m²          Physical Exam  Vitals and nursing note reviewed.   Constitutional:       General: He is not in acute distress.     Appearance: Normal appearance. He is not ill-appearing, toxic-appearing or diaphoretic.   HENT:      Head: Normocephalic and atraumatic.      Right Ear: Tympanic membrane, ear canal and external ear normal. There is no impacted cerumen.      Left Ear: Tympanic membrane, ear canal and external ear normal. There is no impacted cerumen.      Nose: Nose normal. No congestion or rhinorrhea.   Eyes:      General: No scleral icterus.        Right eye: No discharge.         Left eye: No discharge.   Neck:      Vascular: No carotid bruit.   Cardiovascular:      Rate and Rhythm: Normal rate and regular rhythm.      Pulses: Normal pulses.      Heart sounds: Normal heart sounds. No murmur heard.     No friction rub. No gallop.   Pulmonary:      Effort: Pulmonary effort is normal. No respiratory distress.      Breath sounds: Normal breath sounds. No stridor. No wheezing, rhonchi or rales.   Chest:      Chest wall: No tenderness.   Musculoskeletal:         General: Tenderness present. No swelling, deformity or signs of injury.      Cervical back: Normal range of motion and neck supple. No rigidity. No muscular tenderness.      Right lower leg: No edema.      Left lower leg: No edema.      Comments: Pain and swelling over olecranon bilaterally.   Lymphadenopathy:      Cervical: No cervical adenopathy.   Skin:     General: Skin is warm and dry.      Capillary Refill: Capillary refill takes less than 2 seconds.      Coloration: Skin is not jaundiced.      Findings: No bruising, erythema, lesion or rash.   Neurological:      Mental Status: He is alert and oriented to person, place, and time. Mental status is at " baseline.      Cranial Nerves: No cranial nerve deficit.      Sensory: No sensory deficit.      Motor: No weakness.      Coordination: Coordination normal.      Gait: Gait normal.   Psychiatric:         Mood and Affect: Mood normal.         Behavior: Behavior normal.         Thought Content: Thought content normal.         Judgment: Judgment normal.

## 2024-03-07 ENCOUNTER — TELEPHONE (OUTPATIENT)
Age: 44
End: 2024-03-07

## 2024-03-07 NOTE — TELEPHONE ENCOUNTER
Patient calling for pre auth for     Semaglutide-Weight Management (WEGOVY) 0.5 MG/0.5ML Inject 0.5 mL (0.5 mg total) under the skin once a week            Summary: Inject 0.5 mL (0.5 mg total) under the skin once a week, Starting Wed 3/6/2024, Print  Dose, Route, Frequency: 0.5 mg, Subcutaneous, WeeklyStart: 03/06/2024Ord/Sold: 03/06/2024 (O)Ordered On: 03/06/2024ReportDx Associated: Taking: Long-term: Med Note:              Authorized By: Ameya Zhao DO  Dispense: 2 mL  Refills: 2 ordered  Prior Authorization: Pending   Enter Details  Cancel  Change Payer  Dose History          Take 1 capsule (0.4 mg total) by mouth daily with dinner for 5 days        Patient not taking. Informant: Self, Reported on 6/7/2023      Please send to   [facility]  [address] [place] [id number]   [phone]     [facility]/pharmacy #[id number] [place], [place] - [address] [place] [address] [phone]

## 2024-03-07 NOTE — TELEPHONE ENCOUNTER
Medication was previously approved. There is another message entered and sent to PA dept requesting further review due to it previously being covered.

## 2024-03-07 NOTE — TELEPHONE ENCOUNTER
PA for Semaglutide-Weight Management (WEGOVY) 0.5 MG/0.5ML  Denied    Reason:              Message sent to provider pool Yes    Denial letter scanned into Media Yes    Appeal started No

## 2024-03-07 NOTE — TELEPHONE ENCOUNTER
PA for Semaglutide-Weight Management (WEGOVY) 0.5 MG/0.5ML     Submitted via    []CMM-KEY   [x][employer]-Case ID # [id number]   []Faxed to plan   []Other website   []Phone call Case ID #     Office notes sent, clinical questions answered. Awaiting determination    Turnaround time for your insurance to make a decision on your Prior Authorization can take 7-21 business days.

## 2024-03-07 NOTE — TELEPHONE ENCOUNTER
Patient received automatic call saying med prior auth was denied. He has previously had approval for this medication scanned under media. Please advise.

## 2024-03-11 NOTE — TELEPHONE ENCOUNTER
Pt checking on status of appeal. If appeal or auth not started please start. Pharmacy has med in stock again and they run out fast. Trying to get this auth/appeal asap. There are two messages in here about this.

## 2024-03-12 ENCOUNTER — TELEPHONE (OUTPATIENT)
Age: 44
End: 2024-03-12

## 2024-03-12 NOTE — TELEPHONE ENCOUNTER
Will wait for the fax to come over, but I do not believe a reinitiating of auth or an appeal will accomplish an approval. Yes, patient has been stable on med but from baseline to present he has not lost 5% of body weight which is one of the reasons insurance denied. Will follow up again once paperwork is received though.

## 2024-03-12 NOTE — TELEPHONE ENCOUNTER
Last visit 03/06/2024    Patient stated that the reason his insurance denied the request for wegovy is because they need the doctor to state that the patient is having trouble losing weight and that he can handle the amount being prescribed. The insurance company has faxed over the forms today. For further details, patient can be contacted at [phone]. Please advise.

## 2024-03-13 ENCOUNTER — PATIENT MESSAGE (OUTPATIENT)
Dept: FAMILY MEDICINE CLINIC | Facility: CLINIC | Age: 44
End: 2024-03-13

## 2024-03-13 NOTE — TELEPHONE ENCOUNTER
Appeal not being done at this time. Insurance faxed form to office which has been completed and faxed back. Awaiting response.

## 2024-03-13 NOTE — TELEPHONE ENCOUNTER
Duplicate documentation: Appeal not being done at this time. Insurance faxed form to office which has been completed and faxed back. Awaiting response.

## 2024-03-13 NOTE — TELEPHONE ENCOUNTER
Paperwork has been received from the [employer] insurance. It has been completed. Medical records gathered to send along - paperwork faxed to [phone].

## 2024-03-19 NOTE — TELEPHONE ENCOUNTER
Patient called regarding the Wegovy prior auth denial.  Patient was confused that he was asked if he lost weight the first time he was on the medication, because he was never on the medication.  Patient would like an update on the appeal for the medication prior approval. Please return patients call.

## 2024-03-20 ENCOUNTER — TELEPHONE (OUTPATIENT)
Dept: FAMILY MEDICINE CLINIC | Facility: CLINIC | Age: 44
End: 2024-03-20

## 2024-03-20 ENCOUNTER — OFFICE VISIT (OUTPATIENT)
Dept: FAMILY MEDICINE CLINIC | Facility: CLINIC | Age: 44
End: 2024-03-20
Payer: COMMERCIAL

## 2024-03-20 VITALS
WEIGHT: 206 LBS | DIASTOLIC BLOOD PRESSURE: 74 MMHG | HEIGHT: 66 IN | SYSTOLIC BLOOD PRESSURE: 110 MMHG | BODY MASS INDEX: 33.11 KG/M2 | TEMPERATURE: 98 F

## 2024-03-20 DIAGNOSIS — R79.89 ABNORMAL SERUM TESTOSTERONE LEVEL: ICD-10-CM

## 2024-03-20 DIAGNOSIS — Z00.00 WELL ADULT EXAM: ICD-10-CM

## 2024-03-20 DIAGNOSIS — R79.89 LOW TSH LEVEL: Primary | ICD-10-CM

## 2024-03-20 DIAGNOSIS — M70.22 OLECRANON BURSITIS OF LEFT ELBOW: Primary | ICD-10-CM

## 2024-03-20 PROCEDURE — 99213 OFFICE O/P EST LOW 20 MIN: CPT | Performed by: FAMILY MEDICINE

## 2024-03-20 NOTE — PROGRESS NOTES
"Assessment/Plan: Informed consent obtained.  Betadine and alcohol used.  Injection left olecranon bursa with 1 cc of lidocaine 1% without epi and half a cc of Depo-Medrol 40.  Patient tolerated procedure well.  Patient may use ice and ibuprofen for the next few days.  Guidance given.  Patient will follow-up as needed in this regard       Diagnoses and all orders for this visit:    Olecranon bursitis of left elbow            Subjective:        Patient ID: Tony Corey is a 44 y.o. male.      Patient is here with swelling pain over the left olecranon bursa.  Patient wishes injection.    Elbow Pain  Associated symptoms include arthralgias.       The following portions of the patient's history were reviewed and updated as appropriate: allergies, current medications, past family history, past medical history, past social history, past surgical history and problem list.      Review of Systems   Musculoskeletal:  Positive for arthralgias.           Objective:               /74 (BP Location: Right arm, Patient Position: Sitting, Cuff Size: Standard)   Temp 98 °F (36.7 °C) (Temporal)   Ht 5' 6\" (1.676 m)   Wt 93.4 kg (206 lb)   BMI 33.25 kg/m²          Physical Exam  Musculoskeletal:         General: Tenderness present.      Comments: Left olecranon bursa tenderness and swelling.  No redness         "

## 2024-03-21 ENCOUNTER — TELEPHONE (OUTPATIENT)
Dept: FAMILY MEDICINE CLINIC | Facility: CLINIC | Age: 44
End: 2024-03-21

## 2024-03-21 NOTE — TELEPHONE ENCOUNTER
Provided new information regarding patient not even being able to start the Wegovy due to it not being able to be obtained - I initiated a new auth via CMM with clinical information attached.     [facility] Key: [id number]

## 2024-03-26 NOTE — TELEPHONE ENCOUNTER
Request was cancelled as an auth was already initiated / denied. For further review an appeal would have to be done.   Message sent to PA dept for initiating appeal.

## 2024-03-28 ENCOUNTER — TELEPHONE (OUTPATIENT)
Age: 44
End: 2024-03-28

## 2024-03-28 NOTE — TELEPHONE ENCOUNTER
Yeny from [employer] called in regards to a fax that she sent over an hour ago . Made her aware we havent received anything as of now .

## 2024-03-29 ENCOUNTER — TELEPHONE (OUTPATIENT)
Dept: FAMILY MEDICINE CLINIC | Facility: CLINIC | Age: 44
End: 2024-03-29

## 2024-03-29 NOTE — TELEPHONE ENCOUNTER
I did speak to the patient regarding [facility].  He told me that his insurance company told him it will be approved by Monday.  I told him to call us back on Monday if there are any problems.

## 2024-03-29 NOTE — TELEPHONE ENCOUNTER
Patient would like to know how often he can get the shots in his elbows that you have been giving him in a year?

## 2024-04-01 NOTE — TELEPHONE ENCOUNTER
**Dr Zhao - please addend the OV note from 3/6/2024 to specifically indicate he will be using wegovy in combination with diet and exercise.**      I spoke with the patient. He stated when he spoke with the insurance he was told it'd be approved. But then he got a notification in the mail indicating it was denied.    I contacted [email or web address] @ [phone], spoke with Mason in the appeals dept. She explained the insurance is looking for documentation to specifically indicate the medication will be used with diet/exercise. I did reiterate to her the patient never got the medication due to backorders. She did state they're able to see that on their end.   She suggested addending the most recent OV note to reflect this detail. She stated once this information is received, that should be all that is needed for the 2nd stage appeal for approval.  I can fax the updated information to [phone] with case #: [id number].       Call ref #: [id number]

## 2024-04-01 NOTE — TELEPHONE ENCOUNTER
Pt was returning call to Johana about Wegovy.  See message below.  Additional messages about Wegovy in other encounters.    Warm transferred to Juany

## 2024-04-10 NOTE — TELEPHONE ENCOUNTER
Received response back from [employer]. They approved the wegovy 0.5mg for 6 months expiring 10/08/2024.     I left a message notifying patient of the overturned denial / approval. Asked for notification back (via phone call or [employer]) to let me know which pharmacy to send approval letter.

## 2024-04-11 DIAGNOSIS — E66.9 OBESITY (BMI 30-39.9): ICD-10-CM

## 2024-04-11 NOTE — TELEPHONE ENCOUNTER
Pt returned call about approval of Wegovy.  See message below    Pharmacy for approval letter  [facility] in [place]

## 2024-06-04 ENCOUNTER — APPOINTMENT (OUTPATIENT)
Dept: LAB | Facility: HOSPITAL | Age: 44
End: 2024-06-04
Payer: COMMERCIAL

## 2024-06-04 ENCOUNTER — OFFICE VISIT (OUTPATIENT)
Dept: FAMILY MEDICINE CLINIC | Facility: CLINIC | Age: 44
End: 2024-06-04
Payer: COMMERCIAL

## 2024-06-04 VITALS
HEIGHT: 64 IN | OXYGEN SATURATION: 98 % | BODY MASS INDEX: 34.38 KG/M2 | DIASTOLIC BLOOD PRESSURE: 80 MMHG | WEIGHT: 201.4 LBS | HEART RATE: 85 BPM | SYSTOLIC BLOOD PRESSURE: 120 MMHG | RESPIRATION RATE: 20 BRPM | TEMPERATURE: 98.2 F

## 2024-06-04 DIAGNOSIS — Z00.00 WELL ADULT EXAM: Primary | ICD-10-CM

## 2024-06-04 DIAGNOSIS — R79.89 LOW TSH LEVEL: ICD-10-CM

## 2024-06-04 DIAGNOSIS — M77.11 RIGHT LATERAL EPICONDYLITIS: ICD-10-CM

## 2024-06-04 DIAGNOSIS — R79.89 ABNORMAL SERUM TESTOSTERONE LEVEL: ICD-10-CM

## 2024-06-04 DIAGNOSIS — E66.9 OBESITY (BMI 30-39.9): ICD-10-CM

## 2024-06-04 DIAGNOSIS — Z12.11 ENCOUNTER FOR COLORECTAL CANCER SCREENING: ICD-10-CM

## 2024-06-04 DIAGNOSIS — Z12.12 ENCOUNTER FOR COLORECTAL CANCER SCREENING: ICD-10-CM

## 2024-06-04 DIAGNOSIS — Z00.00 WELL ADULT EXAM: ICD-10-CM

## 2024-06-04 LAB
ALBUMIN SERPL BCP-MCNC: 4.1 G/DL (ref 3.5–5)
ALP SERPL-CCNC: 73 U/L (ref 34–104)
ALT SERPL W P-5'-P-CCNC: 24 U/L (ref 7–52)
ANION GAP SERPL CALCULATED.3IONS-SCNC: 8 MMOL/L (ref 4–13)
AST SERPL W P-5'-P-CCNC: 27 U/L (ref 13–39)
BASOPHILS # BLD AUTO: 0.04 THOUSANDS/ÂΜL (ref 0–0.1)
BASOPHILS NFR BLD AUTO: 1 % (ref 0–1)
BILIRUB SERPL-MCNC: 0.69 MG/DL (ref 0.2–1)
BUN SERPL-MCNC: 21 MG/DL (ref 5–25)
CALCIUM SERPL-MCNC: 9.9 MG/DL (ref 8.4–10.2)
CHLORIDE SERPL-SCNC: 101 MMOL/L (ref 96–108)
CHOLEST SERPL-MCNC: 202 MG/DL
CO2 SERPL-SCNC: 28 MMOL/L (ref 21–32)
CREAT SERPL-MCNC: 1.36 MG/DL (ref 0.6–1.3)
EOSINOPHIL # BLD AUTO: 0.59 THOUSAND/ÂΜL (ref 0–0.61)
EOSINOPHIL NFR BLD AUTO: 7 % (ref 0–6)
ERYTHROCYTE [DISTWIDTH] IN BLOOD BY AUTOMATED COUNT: 12.2 % (ref 11.6–15.1)
GFR SERPL CREATININE-BSD FRML MDRD: 61 ML/MIN/1.73SQ M
GLUCOSE P FAST SERPL-MCNC: 92 MG/DL (ref 65–99)
HCT VFR BLD AUTO: 50 % (ref 36.5–49.3)
HDLC SERPL-MCNC: 39 MG/DL
HGB BLD-MCNC: 17.8 G/DL (ref 12–17)
IMM GRANULOCYTES # BLD AUTO: 0.07 THOUSAND/UL (ref 0–0.2)
IMM GRANULOCYTES NFR BLD AUTO: 1 % (ref 0–2)
LDLC SERPL CALC-MCNC: 137 MG/DL (ref 0–100)
LYMPHOCYTES # BLD AUTO: 2.27 THOUSANDS/ÂΜL (ref 0.6–4.47)
LYMPHOCYTES NFR BLD AUTO: 26 % (ref 14–44)
MCH RBC QN AUTO: 33.3 PG (ref 26.8–34.3)
MCHC RBC AUTO-ENTMCNC: 35.6 G/DL (ref 31.4–37.4)
MCV RBC AUTO: 94 FL (ref 82–98)
MONOCYTES # BLD AUTO: 0.82 THOUSAND/ÂΜL (ref 0.17–1.22)
MONOCYTES NFR BLD AUTO: 9 % (ref 4–12)
NEUTROPHILS # BLD AUTO: 4.97 THOUSANDS/ÂΜL (ref 1.85–7.62)
NEUTS SEG NFR BLD AUTO: 56 % (ref 43–75)
NONHDLC SERPL-MCNC: 163 MG/DL
NRBC BLD AUTO-RTO: 0 /100 WBCS
PLATELET # BLD AUTO: 173 THOUSANDS/UL (ref 149–390)
PMV BLD AUTO: 11.9 FL (ref 8.9–12.7)
POTASSIUM SERPL-SCNC: 3.7 MMOL/L (ref 3.5–5.3)
PROT SERPL-MCNC: 7.2 G/DL (ref 6.4–8.4)
RBC # BLD AUTO: 5.35 MILLION/UL (ref 3.88–5.62)
SODIUM SERPL-SCNC: 137 MMOL/L (ref 135–147)
TRIGL SERPL-MCNC: 129 MG/DL
TSH SERPL DL<=0.05 MIU/L-ACNC: 3.78 UIU/ML (ref 0.45–4.5)
WBC # BLD AUTO: 8.76 THOUSAND/UL (ref 4.31–10.16)

## 2024-06-04 PROCEDURE — 84402 ASSAY OF FREE TESTOSTERONE: CPT

## 2024-06-04 PROCEDURE — 80061 LIPID PANEL: CPT

## 2024-06-04 PROCEDURE — 84403 ASSAY OF TOTAL TESTOSTERONE: CPT

## 2024-06-04 PROCEDURE — 85025 COMPLETE CBC W/AUTO DIFF WBC: CPT

## 2024-06-04 PROCEDURE — 80053 COMPREHEN METABOLIC PANEL: CPT

## 2024-06-04 PROCEDURE — 99214 OFFICE O/P EST MOD 30 MIN: CPT | Performed by: FAMILY MEDICINE

## 2024-06-04 PROCEDURE — 36415 COLL VENOUS BLD VENIPUNCTURE: CPT

## 2024-06-04 PROCEDURE — 84443 ASSAY THYROID STIM HORMONE: CPT

## 2024-06-04 NOTE — PROGRESS NOTES
"Assessment/Plan: Vaccines reviewed.  Wellness exam done.  Patient believes he had tetanus shot 3 years ago.  Labs reviewed.  Testosterone pending.  Patient will go for colorectal screening with colonoscopy.  No early family history of prostate cancer.  Informed consent obtained for injection/arthrocentesis right elbow.  Continue with Wegovy for obesity.  Follow-up 6 months.       Diagnoses and all orders for this visit:    Well adult exam    Encounter for colorectal cancer screening  -     Ambulatory Referral to Colorectal Surgery; Future    Obesity (BMI 30-39.9)  -     Semaglutide-Weight Management (WEGOVY) 0.5 MG/0.5ML; Inject 0.5 mL (0.5 mg total) under the skin once a week    Right lateral epicondylitis            Subjective:        Patient ID: Tony Corey is a 44 y.o. male.      Patient is here for wellness exam for labs vaccines reviewed.  Uncle with prostate cancer late 60s.  No early family history of prostate cancer.  No colorectal screening done.          The following portions of the patient's history were reviewed and updated as appropriate: allergies, current medications, past family history, past medical history, past social history, past surgical history and problem list.      Review of Systems   Constitutional: Negative.    HENT: Negative.     Eyes: Negative.    Respiratory: Negative.     Cardiovascular: Negative.    Gastrointestinal: Negative.    Endocrine: Negative.    Genitourinary: Negative.    Musculoskeletal:  Positive for arthralgias.   Skin: Negative.    Allergic/Immunologic: Negative.    Neurological: Negative.    Hematological: Negative.    Psychiatric/Behavioral: Negative.             Objective:               /80 (BP Location: Right arm, Patient Position: Sitting, Cuff Size: Large)   Pulse 85   Temp 98.2 °F (36.8 °C) (Temporal)   Resp 20   Ht 5' 4.25\" (1.632 m)   Wt 91.4 kg (201 lb 6.4 oz)   SpO2 98%   BMI 34.30 kg/m²          Physical Exam  Vitals and nursing note reviewed. "   Constitutional:       General: He is not in acute distress.     Appearance: Normal appearance. He is not ill-appearing, toxic-appearing or diaphoretic.   HENT:      Head: Normocephalic and atraumatic.      Right Ear: Tympanic membrane, ear canal and external ear normal. There is no impacted cerumen.      Left Ear: Tympanic membrane, ear canal and external ear normal. There is no impacted cerumen.      Nose: Nose normal. No congestion or rhinorrhea.      Mouth/Throat:      Mouth: Mucous membranes are moist.      Pharynx: No oropharyngeal exudate or posterior oropharyngeal erythema.   Eyes:      General: No scleral icterus.        Right eye: No discharge.         Left eye: No discharge.      Extraocular Movements: Extraocular movements intact.      Conjunctiva/sclera: Conjunctivae normal.      Pupils: Pupils are equal, round, and reactive to light.   Neck:      Vascular: No carotid bruit.   Cardiovascular:      Rate and Rhythm: Normal rate and regular rhythm.      Pulses: Normal pulses.      Heart sounds: Normal heart sounds. No murmur heard.     No friction rub. No gallop.   Pulmonary:      Effort: Pulmonary effort is normal. No respiratory distress.      Breath sounds: Normal breath sounds. No stridor. No wheezing, rhonchi or rales.   Chest:      Chest wall: No tenderness.   Musculoskeletal:         General: No swelling, deformity or signs of injury.      Cervical back: Normal range of motion and neck supple. No rigidity. No muscular tenderness.      Right lower leg: No edema.      Left lower leg: No edema.      Comments: Right elbow swelling and pain.   Lymphadenopathy:      Cervical: No cervical adenopathy.   Skin:     General: Skin is warm and dry.      Capillary Refill: Capillary refill takes less than 2 seconds.      Coloration: Skin is not jaundiced.      Findings: No bruising, erythema, lesion or rash.   Neurological:      Mental Status: He is alert and oriented to person, place, and time. Mental status is  at baseline.      Cranial Nerves: No cranial nerve deficit.      Sensory: No sensory deficit.      Motor: No weakness.      Coordination: Coordination normal.      Gait: Gait normal.   Psychiatric:         Mood and Affect: Mood normal.         Behavior: Behavior normal.         Thought Content: Thought content normal.         Judgment: Judgment normal.

## 2024-06-06 LAB
TESTOST FREE SERPL-MCNC: 36.3 PG/ML (ref 6.8–21.5)
TESTOST SERPL-MCNC: 1477 NG/DL (ref 264–916)

## 2024-06-10 ENCOUNTER — TELEPHONE (OUTPATIENT)
Dept: FAMILY MEDICINE CLINIC | Facility: CLINIC | Age: 44
End: 2024-06-10

## 2024-06-10 NOTE — TELEPHONE ENCOUNTER
Patient called to ask about his lab results. Patient was counseled on diet to decrease his overall cholesterol and also his LDL. Patient was advised that he should not use any creatinine supplements. Patient states that he is being seen by a nephrologist. Patient also advised per PCP to decrease his testosterone dose. Patient verbalizes understanding.

## 2024-06-10 NOTE — TELEPHONE ENCOUNTER
----- Message from Ameya Zhao DO sent at 6/8/2024  7:14 AM EDT -----  Call patient.  Testosterone level 1477.  Other labs stable.  Cholesterol 202 which is improved.  Hemoglobin slightly elevated which is commonly with testosterone.  Recommend decreasing testosterone dose.

## 2024-06-18 ENCOUNTER — TELEPHONE (OUTPATIENT)
Age: 44
End: 2024-06-18

## 2024-06-18 NOTE — TELEPHONE ENCOUNTER
[facility] COLON     Screened by: David Chiu MA    Referring Provider pcp    Pre- Screening:     There is no height or weight on file to calculate BMI.  Has patient been referred for a routine screening Colonoscopy? yes  Is the patient between 45-75 years old? yes      Previous Colonoscopy no   If yes:    Date:     Facility:     Reason:       Does the patient want to see a Gastroenterologist prior to their procedure OR are they having any GI symptoms? no    Has the patient been hospitalized or had abdominal surgery in the past 6 months? no    Does the patient use supplemental oxygen? no    Does the patient take Coumadin, Lovenox, Plavix, Elliquis, Xarelto, or other blood thinning medication? no    Has the patient had a stroke, cardiac event, or stent placed in the past year? no    Colonoscopy scheduled    If patient is between 45yrs - 49yrs, please advise patient that we will have to confirm benefits & coverage with their insurance company for a routine screening colonoscopy.    ASC Screening    ASC Screening  BMI > than 45: No  Are you currently pregnant?: No  Do you rely on a wheelchair for mobility?: No  Do you need oxygen during the day?: No  Have you ever been informed by anesthesia that you have a difficult airway?: No  Have you been diagnosed with End Stage Renal Disease (ESRD)?: No  Are you actively on dialysis?: No  Have you been diagnosed with Pulmonary Hypertension?: No  Do you have a pacemaker or an Automatic Implantable Cardioverter Defibrillator (AICD)?: No  Have you ever had an organ transplant?: No  Have you had a stroke, heart attack, myocardial infarction (MI) within the last 6 months?: No  Have you ever been diagnosed with Aortic Stenosis?: No  Have you ever been diagnosed  with Congestive Heart Failure?: No  Have you ever been diagnosed with a heart valve disease?: No  Are you Diabetic?: No  If you are Diabetic, has your A1C been greater than 12 within the last six months?: N/A       Appointment  Information   Name: Tony Corey MRN: [id number]   Date: 8/20/2024 Status: [facility]   Time: 9:00 AM  9:00 AM Length: 30  30   Visit Type: GI COLONOSCOPY [1102] Copay: $0.00   Provider: Gino Ann MD   GI ROOM 01       Bowel prep reviewed with patient:miralax  Instructions reviewed with patient by:jasson goldstein  Clearances:   none

## 2024-06-27 ENCOUNTER — HOSPITAL ENCOUNTER (OUTPATIENT)
Dept: ULTRASOUND IMAGING | Facility: MEDICAL CENTER | Age: 44
Discharge: HOME | End: 2024-06-27
Payer: COMMERCIAL

## 2024-06-27 DIAGNOSIS — N20.0 CALCULUS OF KIDNEY: ICD-10-CM

## 2024-06-27 PROCEDURE — 76775 US EXAM ABDO BACK WALL LIM: CPT

## 2024-07-03 ENCOUNTER — TELEPHONE (OUTPATIENT)
Dept: OTHER | Facility: HOSPITAL | Age: 44
End: 2024-07-03

## 2024-07-03 NOTE — TELEPHONE ENCOUNTER
stone surgery 1 yr ago. recovered. please let Tony know US this week looks great. stone free . he can f/u as needed

## 2024-07-04 PROBLEM — Z00.00 WELL ADULT EXAM: Status: RESOLVED | Noted: 2024-06-04 | Resolved: 2024-07-04

## 2024-07-11 ENCOUNTER — OCCMED (OUTPATIENT)
Dept: URGENT CARE | Facility: MEDICAL CENTER | Age: 44
End: 2024-07-11
Payer: OTHER MISCELLANEOUS

## 2024-07-11 ENCOUNTER — APPOINTMENT (OUTPATIENT)
Dept: RADIOLOGY | Facility: MEDICAL CENTER | Age: 44
End: 2024-07-11
Payer: COMMERCIAL

## 2024-07-11 DIAGNOSIS — M25.562 ACUTE PAIN OF LEFT KNEE: ICD-10-CM

## 2024-07-11 DIAGNOSIS — Z04.2 ENCOUNTER FOR EXAMINATION OR OBSERVATION FOLLOWING WORK ACCIDENT: Primary | ICD-10-CM

## 2024-07-11 PROCEDURE — G0382 LEV 3 HOSP TYPE B ED VISIT: HCPCS | Performed by: NURSE PRACTITIONER

## 2024-07-11 PROCEDURE — 73564 X-RAY EXAM KNEE 4 OR MORE: CPT

## 2024-07-11 PROCEDURE — 99283 EMERGENCY DEPT VISIT LOW MDM: CPT | Performed by: NURSE PRACTITIONER

## 2024-07-18 ENCOUNTER — APPOINTMENT (OUTPATIENT)
Dept: URGENT CARE | Facility: MEDICAL CENTER | Age: 44
End: 2024-07-18
Payer: OTHER MISCELLANEOUS

## 2024-07-18 PROCEDURE — 99213 OFFICE O/P EST LOW 20 MIN: CPT

## 2024-08-06 ENCOUNTER — APPOINTMENT (OUTPATIENT)
Dept: URGENT CARE | Facility: MEDICAL CENTER | Age: 44
End: 2024-08-06
Payer: OTHER MISCELLANEOUS

## 2024-08-06 PROCEDURE — 99213 OFFICE O/P EST LOW 20 MIN: CPT | Performed by: NURSE PRACTITIONER

## 2024-08-13 ENCOUNTER — TELEPHONE (OUTPATIENT)
Age: 44
End: 2024-08-13

## 2024-08-13 DIAGNOSIS — M70.22 OLECRANON BURSITIS OF BOTH ELBOWS: Primary | ICD-10-CM

## 2024-08-13 DIAGNOSIS — M70.21 OLECRANON BURSITIS OF BOTH ELBOWS: Primary | ICD-10-CM

## 2024-08-13 NOTE — TELEPHONE ENCOUNTER
Pt called the office requesting for a referral to see a specialist regarding his Rt elbow pain. Pt c/o pain and swollen elbow for the past year. Appointment was offered however pt refused as the provider knows about his problem. Please review and advise

## 2024-08-13 NOTE — TELEPHONE ENCOUNTER
Pt returning call. I question patient if he wanted [facility] per message below.Pt states whatever Dr. Zhao think, and who ever Dr. Zhao thinks is the best. Please advise.

## 2024-08-13 NOTE — TELEPHONE ENCOUNTER
Left message for patient to call back to see if he wants a referral placed for [facility].

## 2024-08-19 ENCOUNTER — TELEPHONE (OUTPATIENT)
Dept: GASTROENTEROLOGY | Facility: HOSPITAL | Age: 44
End: 2024-08-19

## 2024-08-20 ENCOUNTER — OFFICE VISIT (OUTPATIENT)
Dept: OBGYN CLINIC | Facility: CLINIC | Age: 44
End: 2024-08-20
Payer: COMMERCIAL

## 2024-08-20 DIAGNOSIS — M77.8 TRICEPS TENDINITIS: Primary | ICD-10-CM

## 2024-08-20 DIAGNOSIS — M70.22 OLECRANON BURSITIS OF BOTH ELBOWS: ICD-10-CM

## 2024-08-20 DIAGNOSIS — M70.21 OLECRANON BURSITIS OF BOTH ELBOWS: ICD-10-CM

## 2024-08-20 PROCEDURE — 99244 OFF/OP CNSLTJ NEW/EST MOD 40: CPT | Performed by: FAMILY MEDICINE

## 2024-08-20 NOTE — PROGRESS NOTES
Assessment:     1. Triceps tendinitis  MRI elbow right wo contrast      2. Olecranon bursitis of both elbows  Ambulatory Referral to Orthopedic Surgery    XR elbow 3+ vw right    XR elbow 3+ vw left    MRI elbow right wo contrast        Orders Placed This Encounter   Procedures    XR elbow 3+ vw right    XR elbow 3+ vw left    MRI elbow right wo contrast        Impression:   Right elbow pain likely secondary to resolving olecranon bursitis versus triceps tendinitis.    Left elbow pain likely secondary to potential resolving olecranon bursitis versus triceps tendinitis    Conservative Management   We discussed different treatment options:  Reviewed multiple appointments to PCP for olecranon bursa corticosteroid injections.  Ice or Heat Therapy as needed 1-2 times daily for 10-20 minutes. As tolerated.   Over the counter Tylenol and/or NSAIDs  as needed based off your Past Medical Hx. Please follow product label for dosing and maximum limits.    Please range joint through gentle range of motion as tolerated.   Due to recurrent right elbow olecranon bursitis will obtain MRI for further evaluation of right elbow.        Imaging   All imaging from today was reviewed by myself and explained to the patient.   08/20/2024 right elbow x-ray: No acute osseous abnormality  08/20/2024 left elbow x-ray no acute osseous abnormality    Procedure  Not appropriate at this time.     Shared decision making, patient agreeable to plan.      Return for Follow up after completion of advanced imaging.    HPI:   Tony Corey is a RHD 44 y.o. male  who presents for evaluation of   Chief Complaint   Patient presents with    Right Elbow - Numbness, Pain, Swelling     Pushing and pulling   Pain 10 when hit     Left Elbow - Follow-up     Pain is just sore        Occupation: standing/lifting    Onset/Mechanism: Over 1 year of elbow bilateral pain. Denies any trauma.   Location: tricep insertion.  Severity: Right Current severity: 2/10. Left 0/10  Max severity: 1000/10.left: 5/10   Pain described as:unable to describe   Radiation: only if accidentally hits elbow, otherwise no.  Provocative: exercise with weight elbow extension.  Associated symptoms: denies weakness. Will get numbness/tingling  Hx of cervical disc pathology.      Denies any hx of fracture of affected limb.   Denies any surgical history of affected limb.      Summary of treatment to-date:   03/20/2024 CSI left   Multiple CSI to right elbow      Following History Reviewed and Updated     Past Medical History:   Diagnosis Date    Epilepsy (HCC)     and recurrent seizures, one time age 12    Kidney stone      Past Surgical History:   Procedure Laterality Date    ANTERIOR CRUCIATE LIGAMENT REPAIR Right     FL RETROGRADE PYELOGRAM  5/15/2023    MA CYSTO BLADDER W/URETERAL CATHETERIZATION Left 5/15/2023    Procedure: CYSTOSCOPY RETROGRADE PYELOGRAM WITH URETEROSCOPY, STONE EXTRACTION,  INSERTION STENT URETERAL LEFT;  Surgeon: Emanuel Capps MD;  Location: [facility];  Service: Urology     Family History   Problem Relation Age of Onset    Other Mother         liver transplant       Social History     Substance and Sexual Activity   Alcohol Use Yes    Comment: socially     Social History     Substance and Sexual Activity   Drug Use Never     Social History     Tobacco Use   Smoking Status Never    Passive exposure: Never   Smokeless Tobacco Never       Social Determinants of Health     Tobacco Use: Low Risk  (8/20/2024)    Patient History     Smoking Tobacco Use: Never     Smokeless Tobacco Use: Never     Passive Exposure: Never   Alcohol Use: Unknown (2/19/2021)    AUDIT-C     Frequency of Alcohol Consumption: 2-4 times a month     Average Number of Drinks: Not on file     Frequency of Binge Drinking: Not on file   Financial Resource Strain: Low Risk  (2/19/2021)    Overall Financial Resource Strain (CARDIA)     Difficulty of Paying Living Expenses: Not hard at all   Food Insecurity: No Food  Insecurity (2/19/2021)    Hunger Vital Sign     Worried About Running Out of Food in the Last Year: Never true     Ran Out of Food in the Last Year: Never true   Transportation Needs: No Transportation Needs (2/19/2021)    PRAPARE - Transportation     Lack of Transportation (Medical): No     Lack of Transportation (Non-Medical): No   Physical Activity: Sufficiently Active (11/2/2020)    Exercise Vital Sign     Days of Exercise per Week: 7 days     Minutes of Exercise per Session: 60 min   Stress: No Stress Concern Present (11/2/2020)    [place] [employer] - Occupational Stress Questionnaire     Feeling of Stress : Not at all   Social Connections: Socially Integrated (11/2/2020)    Social Connection and Isolation Panel [NHANES]     Frequency of Communication with Friends and Family: More than three times a week     Frequency of Social Gatherings with Friends and Family: More than three times a week     Attends Bahai Services: 1 to 4 times per year     Active Member of Clubs or Organizations: Yes     Attends Club or Organization Meetings: 1 to 4 times per year     Marital Status:    Intimate Partner Violence: Not At Risk (11/2/2020)    Humiliation, Afraid, Rape, and Kick questionnaire     Fear of Current or Ex-Partner: No     Emotionally Abused: No     Physically Abused: No     Sexually Abused: No   Depression: Not at risk (6/4/2024)    PHQ-2     PHQ-2 Score: 0   Housing Stability: Not on file   Utilities: Not on file   Health Literacy: Not on file        No Known Allergies    Review of Systems      Review of Systems     Review of Systems   Constitutional: Negative for chills and fever.   HENT: Negative for drooling and sneezing.    Eyes: Negative for redness.   Respiratory: Negative for cough and wheezing.    Gastrointestinal: Negative for vomiting.   Psychiatric/Behavioral: Negative for behavioral problems. The patient is not nervous/anxious.      All other systems negative.    Physical Exam   Physical Exam    Vitals and nursing note reviewed.  Constitutional:   Appearance. Normal Appearance.  There were no vitals taken for this visit.    There is no height or weight on file to calculate BMI.   HENT:  Head: Atraumatic.  Nose: Nose normal  Eyes: Conjunctiva/sclera: Conjunctivae normal.  Cardiovascular:   Rate and Rhythm: Bilateral equal distal pulses  Pulmonary:   Effort: Pulmonary effort is normal  Skin:   General: Skin is warm and dry.  Neurological:   General: No focal deficit present.  Mental Status: Alert and oriented to person, place, and time.   Psychiatric:   Mood and Affect: mood normal.  Behavior: Behavior normal     Musculoskeletal Exam     Ortho Exam   B/L ELBOW    INSPECTION:  Erythema Swelling Ecchymosis Increased warmth   no Minimal bilateral olecranon bursitis swelling no no     PASSIVE ROM:  Elbow Flexion/Extension Wrist Flexion/Extension Supination Pronation   full and symmetrical full and symmetrical intact and symmetrical intact and symmetrical     PALPATION/TENDERNESS  Medial epicondyle Lateral epicondyle Olecranon Olecranon fossa   Negative Neg. Discomfort on the right. Neg.     Radial Head Ulnar collateral ligament (UCL) Valgus stress Varus stress   Negative        STRENGTH  Flexion Extension Pronation Supination   5/5 5/5, with discomfort on the right reproducing chief complaint 5/5 5/5     SPECIAL TEST  Yergason's Test Elbow hook test Tinel's test ulnar nerve Palpable ulnar subluxation    Negative Neg. Neg. .      Valgus Stress Test Milking Maneuver Moving Valgus Stress Test   Elbow flexed 30 degrees, valgus stress applied  Forearm supinated, elbow flexed 90 degrees, valgus stress applied by pulling thumb  Constant valgus stress applied, elbow moved through arc of flexion and extension             Lopez Test Maudsley's Test   Resisted wrist extension in Full elbow extension Resisted long finger extension in full elbow extension   Negative Neg.  "    NEUROVASCULAR:  Sensation to light touch Neurovascularly intact distally   Intact  Yes      (Test not completed if space left blank )         Procedures       Portions of the record may have been created with voice recognition software. Occasional wrong word or \"sound alike\" substitutions may have occurred due to the inherent limitations of voice recognition software. Please review the chart carefully and recognize, using context, where substitutions/typographical errors may have occurred.   "

## 2024-08-20 NOTE — LETTER
August 20, 2024     Ameya Zhao DO  [address] [facility] [address]    Patient: Tony Corey   YOB: 1980   Date of Visit: 8/20/2024       Dear Dr. Zhao:    Thank you for referring Tony Corey to me for evaluation. Below are the relevant portions of my assessment and plan of care.         If you have questions, please do not hesitate to call me. I look forward to following Tony along with you.         Sincerely,        Giselle Garcia DO        CC: No Recipients

## 2024-09-03 ENCOUNTER — HOSPITAL ENCOUNTER (OUTPATIENT)
Dept: RADIOLOGY | Facility: IMAGING CENTER | Age: 44
Discharge: HOME | End: 2024-09-03
Payer: COMMERCIAL

## 2024-09-03 DIAGNOSIS — M77.8 TRICEPS TENDINITIS: ICD-10-CM

## 2024-09-03 DIAGNOSIS — M70.21 OLECRANON BURSITIS OF BOTH ELBOWS: ICD-10-CM

## 2024-09-03 DIAGNOSIS — M70.22 OLECRANON BURSITIS OF BOTH ELBOWS: ICD-10-CM

## 2024-09-03 PROCEDURE — 73221 MRI JOINT UPR EXTREM W/O DYE: CPT

## 2024-09-05 ENCOUNTER — TELEPHONE (OUTPATIENT)
Dept: FAMILY MEDICINE CLINIC | Facility: CLINIC | Age: 44
End: 2024-09-05

## 2024-09-05 NOTE — TELEPHONE ENCOUNTER
Please call pt. MRI shows scar tissue and tearing of some triceps tendon and epicondylitis, recommend discussing with ortho

## 2024-09-12 ENCOUNTER — OFFICE VISIT (OUTPATIENT)
Dept: OBGYN CLINIC | Facility: CLINIC | Age: 44
End: 2024-09-12
Payer: COMMERCIAL

## 2024-09-12 ENCOUNTER — TELEPHONE (OUTPATIENT)
Dept: OBGYN CLINIC | Facility: HOSPITAL | Age: 44
End: 2024-09-12

## 2024-09-12 VITALS — BODY MASS INDEX: 34.31 KG/M2 | HEIGHT: 64 IN | WEIGHT: 201 LBS

## 2024-09-12 DIAGNOSIS — M77.8 TRICEPS TENDINITIS: Primary | ICD-10-CM

## 2024-09-12 PROCEDURE — 99213 OFFICE O/P EST LOW 20 MIN: CPT | Performed by: FAMILY MEDICINE

## 2024-09-12 NOTE — PROGRESS NOTES
Assessment:     1. Triceps tendinitis  Ambulatory Referral to Orthopedic Surgery        Orders Placed This Encounter   Procedures    Ambulatory Referral to Orthopedic Surgery        Impression:   Right elbow pain likely secondary to triceps tendinopathy, partial tendon tear       Conservative Management   We discussed different treatment options:  Previously reviewed/discussed  Reviewed multiple appointments to PCP for olecranon bursa corticosteroid injections.  Ice or Heat Therapy as needed 1-2 times daily for 10-20 minutes. As tolerated.   Over the counter Tylenol and/or NSAIDs  as needed based off your Past Medical Hx. Please follow product label for dosing and maximum limits.    Please range joint through gentle range of motion as tolerated.   Due to recurrent right elbow olecranon bursitis will obtain MRI for further evaluation of right elbow.  Today's discussion/review  Reviewed MRI with patient.  Due to chronicity of triceps pathology with no acute injury will not immobilize patient at this time.  Reviewed that I would like patient to follow-up with orthopedic surgeon to discuss conservative or surgical management, if appropriate.  Reviewed no heavy lifting until appointment with orthopedic surgeon.        Imaging   08/20/2024 right elbow x-ray: No acute osseous abnormality  08/20/2024 left elbow x-ray no acute osseous abnormality  All imaging below was reviewed by myself and explained to the patient.   09/03/2024 MRI right elbow: Triceps insertion tendinopathy  Radiological IMPRESSION:   Tendinosis and partial-thickness tearing of the distal triceps insertion.   Mild common flexor tendinosis (lateral epicondylitis)     Procedure  Not appropriate at this time.      Shared decision making, patient agreeable to plan.      Return for Follow up with Orthopedic Surgeon.    HPI:   Tony Corey is a 44 y.o. male  who presents for evaluation of   Chief Complaint   Patient presents with    Right Elbow - Follow-up     Left Elbow - Follow-up       Today's visit  Denies any new trauma  Location: Triceps insertion.  Severity: Current severity: 2/10.   Patient states the pain has been chronic.  He is able to warm up his tricep and complete weight lifting exercises normally.  However he has chronic pain within this region.    Previous visit 08/20/2024  Occupation: standing/lifting     Onset/Mechanism: Over 1 year of elbow bilateral pain. Denies any trauma.   Location: tricep insertion.  Severity: Right Current severity: 2/10. Left 0/10 Max severity: 1000/10.left: 5/10   Pain described as:unable to describe   Radiation: only if accidentally hits elbow, otherwise no.  Provocative: exercise with weight elbow extension.  Associated symptoms: denies weakness. Will get numbness/tingling  Hx of cervical disc pathology.       Denies any hx of fracture of affected limb.   Denies any surgical history of affected limb.       Summary of treatment to-date:   03/20/2024 CSI left   Multiple CSI to right elbow    Following History Reviewed and Updated     Past Medical History:   Diagnosis Date    Epilepsy (HCC)     and recurrent seizures, one time age 12    Kidney stone      Past Surgical History:   Procedure Laterality Date    ANTERIOR CRUCIATE LIGAMENT REPAIR Right     FL RETROGRADE PYELOGRAM  5/15/2023    MD CYSTO BLADDER W/URETERAL CATHETERIZATION Left 5/15/2023    Procedure: CYSTOSCOPY RETROGRADE PYELOGRAM WITH URETEROSCOPY, STONE EXTRACTION,  INSERTION STENT URETERAL LEFT;  Surgeon: Emanuel Capps MD;  Location: [facility];  Service: Urology     Family History   Problem Relation Age of Onset    Other Mother         liver transplant       Social History     Substance and Sexual Activity   Alcohol Use Yes    Comment: socially     Social History     Substance and Sexual Activity   Drug Use Never     Social History     Tobacco Use   Smoking Status Never    Passive exposure: Never   Smokeless Tobacco Never       Social Determinants of Health      Tobacco Use: Low Risk  (9/12/2024)    Patient History     Smoking Tobacco Use: Never     Smokeless Tobacco Use: Never     Passive Exposure: Never   Alcohol Use: Unknown (2/19/2021)    AUDIT-C     Frequency of Alcohol Consumption: 2-4 times a month     Average Number of Drinks: Not on file     Frequency of Binge Drinking: Not on file   Financial Resource Strain: Low Risk  (2/19/2021)    Overall Financial Resource Strain (CARDIA)     Difficulty of Paying Living Expenses: Not hard at all   Food Insecurity: No Food Insecurity (2/19/2021)    Hunger Vital Sign     Worried About Running Out of Food in the Last Year: Never true     Ran Out of Food in the Last Year: Never true   Transportation Needs: No Transportation Needs (2/19/2021)    PRAPARE - Transportation     Lack of Transportation (Medical): No     Lack of Transportation (Non-Medical): No   Physical Activity: Sufficiently Active (11/2/2020)    Exercise Vital Sign     Days of Exercise per Week: 7 days     Minutes of Exercise per Session: 60 min   Stress: No Stress Concern Present (11/2/2020)    [employer] [place] [employer]ational [employer] - Occupational Stress Questionnaire     Feeling of Stress : Not at all   Social Connections: Socially Integrated (11/2/2020)    Social Connection and Isolation Panel [NHANES]     Frequency of Communication with Friends and Family: More than three times a week     Frequency of Social Gatherings with Friends and Family: More than three times a week     Attends [employer] Services: 1 to 4 times per year     Active Member of Clubs or Organizations: Yes     Attends Club or Organization Meetings: 1 to 4 times per year     Marital Status:    Intimate Partner Violence: Not At Risk (11/2/2020)    Humiliation, Afraid, Rape, and Kick questionnaire     Fear of Current or Ex-Partner: No     Emotionally Abused: No     Physically Abused: No     Sexually Abused: No   Depression: Not at risk (6/4/2024)    PHQ-2     PHQ-2 Score: 0   Housing  "Stability: Not on file   Utilities: Not on file   Health Literacy: Not on file        No Known Allergies    Review of Systems      Review of Systems     Review of Systems   Constitutional: Negative for chills and fever.   HENT: Negative for drooling and sneezing.    Eyes: Negative for redness.   Respiratory: Negative for cough and wheezing.    Gastrointestinal: Negative for vomiting.   Psychiatric/Behavioral: Negative for behavioral problems. The patient is not nervous/anxious.      All other systems negative.   Physical Exam   Physical Exam    Vitals and nursing note reviewed.  Constitutional:   Appearance. Normal Appearance.  Ht 5' 4\" (1.626 m)   Wt 91.2 kg (201 lb)   BMI 34.50 kg/m²     Body mass index is 34.5 kg/m².   HENT:  Head: Atraumatic.  Nose: Nose normal  Eyes: Conjunctiva/sclera: Conjunctivae normal.  Cardiovascular:   Rate and Rhythm: Bilateral equal distal pulses  Pulmonary:   Effort: Pulmonary effort is normal  Skin:   General: Skin is warm and dry.  Neurological:   General: No focal deficit present.  Mental Status: Alert and oriented to person, place, and time.   Psychiatric:   Mood and Affect: mood normal.  Behavior: Behavior normal     Musculoskeletal Exam     Ortho Exam     Right ELBOW     INSPECTION:  Erythema Swelling Ecchymosis Increased warmth   no Negative no no      PASSIVE ROM:  Elbow Flexion/Extension Wrist Flexion/Extension Supination Pronation   full and symmetrical full and symmetrical intact and symmetrical intact and symmetrical      PALPATION/TENDERNESS  Medial epicondyle Lateral epicondyle Olecranon Olecranon fossa   Negative Neg. Discomfort on the right. Neg.      Radial Head Ulnar collateral ligament (UCL) Valgus stress Varus stress   Negative            STRENGTH  Flexion Extension Pronation Supination   5/5 5/5, with discomfort on the right reproducing chief complaint 5/5 5/5      SPECIAL TEST  Yergason's Test Elbow hook test Tinel's test ulnar nerve Palpable ulnar subluxation  " "  Negative Neg. Neg. .       Valgus Stress Test Milking Maneuver Moving Valgus Stress Test   Elbow flexed 30 degrees, valgus stress applied  Forearm supinated, elbow flexed 90 degrees, valgus stress applied by pulling thumb  Constant valgus stress applied, elbow moved through arc of flexion and extension                 NEUROVASCULAR:  Sensation to light touch Neurovascularly intact distally   Intact  Yes       (Test not completed if space left blank )         Procedures       Portions of the record may have been created with voice recognition software. Occasional wrong word or \"sound alike\" substitutions may have occurred due to the inherent limitations of voice recognition software. Please review the chart carefully and recognize, using context, where substitutions/typographical errors may have occurred.   "

## 2024-09-12 NOTE — TELEPHONE ENCOUNTER
Caller: Tony    Doctor: Radha Finch     Reason for call: Patient called in to discuss MRI findings and on going elbow pain.  He is scheduled for 9/18 to discuss.     Patient would like to ask if there were any findings on the MRI.  He continues to go to the gym and does not want to make the situation worse , if there is a tear or other problem.      Call back#: [phone]

## 2024-09-13 ENCOUNTER — OFFICE VISIT (OUTPATIENT)
Dept: OBGYN CLINIC | Facility: CLINIC | Age: 44
End: 2024-09-13
Payer: COMMERCIAL

## 2024-09-13 VITALS — HEIGHT: 64 IN | WEIGHT: 201 LBS | BODY MASS INDEX: 34.31 KG/M2

## 2024-09-13 DIAGNOSIS — S46.311A STRAIN OF RIGHT TRICEPS TENDON, INITIAL ENCOUNTER: Primary | ICD-10-CM

## 2024-09-13 DIAGNOSIS — M70.21 OLECRANON BURSITIS OF RIGHT ELBOW: ICD-10-CM

## 2024-09-13 PROCEDURE — 99214 OFFICE O/P EST MOD 30 MIN: CPT | Performed by: ORTHOPAEDIC SURGERY

## 2024-09-13 NOTE — PATIENT INSTRUCTIONS
1. Strain of right triceps tendon, initial encounter      high-grade PT tendon tear      2. Rupture of triceps tendon, right, initial encounter  Ambulatory Referral to Orthopedic Surgery      3. Olecranon bursitis of right elbow          Call ahead to arrange PRP injection if that's the way you'd like to go    Return in about 6 weeks (around 10/25/2024) for re-check, or sooner if symptoms worsen or fail to improve.

## 2024-09-13 NOTE — PROGRESS NOTES
"HAND & UPPER EXTREMITY CONSULT NOTE   Referred By:  Giselle Acevedo, Do  [address] [address]      Chief Complaint:     Right elbow pain    History of Present Illness:   44 y.o., Right hand dominant male presents with pain at his right elbow posteriorly which has been present for about 1 year.  Symptoms worsening recently.  He does enjoy weight lifting which aggarvates his elbow pain, only when isolating his triceps muscle. His pain will be intermittent.  He does not have pain throughout the day with normal activities.    He has had a few cortisone injections with mild temporary improvement of his symptoms to his lateral epicondyle and olecranon bursa through his PCP's office.  No injections into the triceps tendon are documented.    he is a .  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    Past Medical History:  Past Medical History:   Diagnosis Date    Epilepsy (HCC)     and recurrent seizures, one time age 12    Kidney stone          Physical Examination:    Ht 5' 4\" (1.626 m)   Wt 91.2 kg (201 lb)   BMI 34.50 kg/m²     Gen: A&Ox3, NAD  Cardiac: regular rate  Chest: non labored breathing  Abdomen: Non-distended      Right Upper Extremity:  Skin CDI  Swelling posteriorly at his elbow olecranon bursa, no fluctuance or overlying skin changes  Good strength with elbow extension.  No palpable defect in his triceps tendon  No tenderness lateral epicondyle  Mild tenderness of the triceps insertion  Mild pain with resisted elbow extension today in the office   Sensation intact to light touch in the axillary median, ulnar, and radial nerve distributions  5/5 motor throughout  2+RP        Studies:  Right elbow MRI from 9/3/24 was personally interpreted and demonstrates: partial thickness triceps tendon insertional tear approximately 40%  Please see official report.          Assessment and Plan:  1. Strain of right triceps tendon, initial encounter  " Ambulatory Referral to Orthopedic Surgery    high-grade partial tendon tear      2. Olecranon bursitis of right elbow  Ambulatory Referral to Orthopedic Surgery          44 y.o. male presents with signs and symptoms consistent with the above diagnosis.  We discussed the natural history of this condition and its pathogenesis.  We discussed operative and nonoperative treatment options.  He has a high-grade partial tear of approximately 40% at the insertion of his triceps tendon.  He still has excellent strength with some pain with isolated triceps extension testing.  He has had approximately 1 year of pain which has been persistent despite corticosteroid injections and a home exercise program at the gym.  He has not taken any rest from lifting.  He would like to try to avoid surgery if possible.    Discussed non-operative treatment with a period of rest and formal therapy as well as possible PRP. Informed that PRP is not covered through insurance and the results in the literature are mixed with no proven benefit.  However there is some evidence of success and tendinopathy's and it may provide an additional nonoperative option for treatment to help him recover.      He will start with a period of rest from triceps extension activities and bench press for the next 2 months.  He will then slowly return to activities.  He declined formal therapy today.  He will consider possible PRP injection in the future.   If he wishes to proceed with PRP he is to call ahead to arrange this.  If these nonoperative options fail to provide adequate relief we will consider a triceps tendon repair in the future.  We discussed the expected recovery timeframe of 3 to 4 months with maximal improvement at 1 year.  We discussed possible complications and potential return to weightlifting activities and work.    Recommend compression sleeve over his elbow for his olecranon bursitis.  He is to wear this 24 hours/day for 4 weeks and only remove  for hygiene.  This should hopefully provide the best long-term improvement of his olecranon bursitis.    he expressed understanding of the plan and agreed. We encouraged them to contact our office with any questions or concerns.     Return in about 6 weeks (around 10/25/2024) for re-check, or sooner if symptoms worsen or fail to improve.    Lisandro Salcedo MD  Hand and Upper Extremity Surgery    Scribe Attestation      I,:  Amador Austin am acting as a scribe while in the presence of the attending physician.:       I,:  Lisandro Salcedo MD personally performed the services described in this documentation    as scribed in my presence.:

## 2024-10-01 ENCOUNTER — TELEPHONE (OUTPATIENT)
Age: 44
End: 2024-10-01

## 2024-10-01 NOTE — TELEPHONE ENCOUNTER
Spoke w/PT to confirm 10/15/24 colonoscopy, emailed PT prep instructions.  PT aware he will receive call day prior with arrival time.

## 2024-10-15 ENCOUNTER — ANESTHESIA EVENT (OUTPATIENT)
Dept: GASTROENTEROLOGY | Facility: HOSPITAL | Age: 44
End: 2024-10-15
Payer: COMMERCIAL

## 2024-10-15 ENCOUNTER — ANESTHESIA (OUTPATIENT)
Dept: GASTROENTEROLOGY | Facility: HOSPITAL | Age: 44
End: 2024-10-15
Payer: COMMERCIAL

## 2024-10-15 ENCOUNTER — HOSPITAL ENCOUNTER (OUTPATIENT)
Dept: GASTROENTEROLOGY | Facility: HOSPITAL | Age: 44
Setting detail: OUTPATIENT SURGERY
Discharge: HOME | End: 2024-10-15
Attending: COLON & RECTAL SURGERY
Payer: COMMERCIAL

## 2024-10-15 VITALS
HEART RATE: 74 BPM | DIASTOLIC BLOOD PRESSURE: 76 MMHG | TEMPERATURE: 96.2 F | OXYGEN SATURATION: 97 % | RESPIRATION RATE: 16 BRPM | SYSTOLIC BLOOD PRESSURE: 126 MMHG

## 2024-10-15 DIAGNOSIS — Z12.11 SCREEN FOR COLON CANCER: ICD-10-CM

## 2024-10-15 PROCEDURE — G0121 COLON CA SCRN NOT HI RSK IND: HCPCS | Performed by: COLON & RECTAL SURGERY

## 2024-10-15 RX ORDER — PROPOFOL 10 MG/ML
INJECTION, EMULSION INTRAVENOUS AS NEEDED
Status: DISCONTINUED | OUTPATIENT
Start: 2024-10-15 | End: 2024-10-15

## 2024-10-15 RX ORDER — SODIUM CHLORIDE 9 MG/ML
INJECTION, SOLUTION INTRAVENOUS CONTINUOUS PRN
Status: DISCONTINUED | OUTPATIENT
Start: 2024-10-15 | End: 2024-10-15

## 2024-10-15 RX ADMIN — SODIUM CHLORIDE: 0.9 INJECTION, SOLUTION INTRAVENOUS at 09:33

## 2024-10-15 RX ADMIN — PROPOFOL 100 MG: 10 INJECTION, EMULSION INTRAVENOUS at 09:45

## 2024-10-15 RX ADMIN — PROPOFOL 100 MG: 10 INJECTION, EMULSION INTRAVENOUS at 09:41

## 2024-10-15 RX ADMIN — PROPOFOL 50 MG: 10 INJECTION, EMULSION INTRAVENOUS at 09:50

## 2024-10-15 NOTE — ANESTHESIA PREPROCEDURE EVALUATION
Procedure:  COLONOSCOPY    Relevant Problems   CARDIO   (+) Pure hypercholesterolemia      /RENAL   (+) CKD (chronic kidney disease) stage 2, GFR 60-89 ml/min   (+) Hydronephrosis   (+) Nephrolithiasis   (+) Stage 3a chronic kidney disease (HCC)      MUSCULOSKELETAL   (+) Chronic bilateral low back pain without sciatica   (+) Impingement syndrome of left shoulder   (+) Impingement syndrome of right shoulder   (+) Left lateral epicondylitis   (+) Primary osteoarthritis of left shoulder   (+) Primary osteoarthritis of right knee   (+) Right lateral epicondylitis   (+) Strain of calf muscle   (+) TMJ (dislocation of temporomandibular joint)      NEURO/PSYCH   (+) Chronic bilateral low back pain without sciatica   (+) Chronic right shoulder pain   (+) Elbow pain, chronic, left   (+) Muscle weakness of right upper extremity      BMI 34.5    Physical Exam    Airway    Mallampati score: II  TM Distance: >3 FB  Neck ROM: full     Dental   No notable dental hx     Cardiovascular      Pulmonary      Other Findings        Anesthesia Plan  ASA Score- 2     Anesthesia Type- IV sedation with anesthesia with ASA Monitors.         Additional Monitors:     Airway Plan:            Plan Factors-Exercise tolerance (METS): >4 METS.    Chart reviewed.    Patient summary reviewed.                  Induction- intravenous.    Postoperative Plan-     Perioperative Resuscitation Plan - Level 1 - Full Code.       Informed Consent- Anesthetic plan and risks discussed with patient.  I personally reviewed this patient with the CRNA. Discussed and agreed on the Anesthesia Plan with the CRNA..

## 2024-10-15 NOTE — H&P
History and Physical   Colon and Rectal Surgery   Tony Corey 44 y.o. male MRN: [id number]  Unit/Bed#:  Encounter: [id number]  10/15/24   @[facility]    No chief complaint on file.        History of Present Illness   HPI:  Tony Corey is a 44 y.o. male who presents for screening colonoscopy.      Historical Information   Past Medical History:   Diagnosis Date    Epilepsy (HCC)     and recurrent seizures, one time age 12    Kidney stone      Past Surgical History:   Procedure Laterality Date    ANTERIOR CRUCIATE LIGAMENT REPAIR Right     FL RETROGRADE PYELOGRAM  5/15/2023    DC CYSTO BLADDER W/URETERAL CATHETERIZATION Left 5/15/2023    Procedure: CYSTOSCOPY RETROGRADE PYELOGRAM WITH URETEROSCOPY, STONE EXTRACTION,  INSERTION STENT URETERAL LEFT;  Surgeon: Emanuel Capps MD;  Location: [facility];  Service: Urology       Meds/Allergies     Not in a hospital admission.      Current Outpatient Medications:     Semaglutide-Weight Management (WEGOVY) 0.5 MG/0.5ML, Inject 0.5 mL (0.5 mg total) under the skin once a week, Disp: 2 mL, Rfl: 2    No Known Allergies      Social History   Social History     Substance and Sexual Activity   Alcohol Use Yes    Comment: socially     Social History     Substance and Sexual Activity   Drug Use Never     Social History     Tobacco Use   Smoking Status Never    Passive exposure: Never   Smokeless Tobacco Never         Family History:   Family History   Problem Relation Age of Onset    Other Mother         liver transplant         Objective     Current Vitals:      No intake or output data in the 24 hours ending 10/15/24 0815    Physical Exam:  General:  Resting comfortably in bed   Eyes:Sclera anicteric  ENT: Trachea midline  Pulm:  Symmetric chest raise.  No respiratory Distress  CV:  Regular on monitor  Abdomen:  Soft NT ND  Extremities:  No clubbing/ cyanosis/ edema    Lab Results: I have personally reviewed pertinent lab results.    Imaging: Results Review Statement: No pertinent  imaging studies reviewed.      ASSESSMENT:  Tony Corey is a 44 y.o. male who presents for outpatient colonoscopy.      PLAN:  For colonoscopy    Risks/ Benefits reviewed to include but not limited to anesthesia, bleeding, missed lesions, and colonoscopic perforation requiring surgery.

## 2024-10-15 NOTE — ANESTHESIA POSTPROCEDURE EVALUATION
Post-Op Assessment Note    CV Status:  Stable    Pain management: adequate       Mental Status:  Alert and awake   Hydration Status:  Euvolemic   PONV Controlled:  Controlled   Airway Patency:  Patent     Post Op Vitals Reviewed: Yes    No anethesia notable event occurred.    Staff: Anesthesiologist           Last Filed PACU Vitals:  Vitals Value Taken Time   Temp 96.2 °F (35.7 °C) 10/15/24 1000   Pulse 78 10/15/24 1000   /73 10/15/24 1000   Resp 16 10/15/24 1000   SpO2 97 % 10/15/24 1000       Modified Antonio:  Activity: 2 (10/15/2024 10:02 AM)  Respiration: 2 (10/15/2024 10:02 AM)  Circulation: 2 (10/15/2024 10:02 AM)  Consciousness: 1 (10/15/2024 10:02 AM)  Oxygen Saturation: 2 (10/15/2024 10:02 AM)  Modified Antonio Score: 9 (10/15/2024 10:02 AM)

## 2024-10-15 NOTE — ANESTHESIA POSTPROCEDURE EVALUATION
Post-Op Assessment Note            No anethesia notable event occurred.            Last Filed PACU Vitals:  Vitals Value Taken Time   Temp 96.2 °F (35.7 °C) 10/15/24 1000   Pulse 74 10/15/24 1015   /76 10/15/24 1015   Resp 16 10/15/24 1015   SpO2 97 % 10/15/24 1015       Modified Antonio:  Activity: 2 (10/15/2024 10:19 AM)  Respiration: 2 (10/15/2024 10:19 AM)  Circulation: 2 (10/15/2024 10:19 AM)  Consciousness: 2 (10/15/2024 10:19 AM)  Oxygen Saturation: 2 (10/15/2024 10:19 AM)  Modified Antonio Score: 10 (10/15/2024 10:19 AM)

## 2024-11-11 ENCOUNTER — TELEPHONE (OUTPATIENT)
Dept: NEPHROLOGY | Facility: CLINIC | Age: 44
End: 2024-11-11

## 2024-11-11 NOTE — TELEPHONE ENCOUNTER
Left message for patient to schedule follow up appointment (recall list). This is the 1st attempt. Please transfer to office for some available appointments this week.

## 2024-12-03 ENCOUNTER — OFFICE VISIT (OUTPATIENT)
Age: 44
End: 2024-12-03
Payer: COMMERCIAL

## 2024-12-03 VITALS
TEMPERATURE: 97.4 F | HEIGHT: 66 IN | HEART RATE: 76 BPM | SYSTOLIC BLOOD PRESSURE: 116 MMHG | DIASTOLIC BLOOD PRESSURE: 80 MMHG | BODY MASS INDEX: 33.78 KG/M2 | OXYGEN SATURATION: 97 % | WEIGHT: 210.2 LBS

## 2024-12-03 DIAGNOSIS — F51.01 PRIMARY INSOMNIA: ICD-10-CM

## 2024-12-03 DIAGNOSIS — R79.89 ELEVATED TESTOSTERONE LEVEL: ICD-10-CM

## 2024-12-03 DIAGNOSIS — M25.521 RIGHT ELBOW PAIN: ICD-10-CM

## 2024-12-03 DIAGNOSIS — N18.31 STAGE 3A CHRONIC KIDNEY DISEASE (HCC): Primary | ICD-10-CM

## 2024-12-03 DIAGNOSIS — E66.9 OBESITY (BMI 30-39.9): ICD-10-CM

## 2024-12-03 DIAGNOSIS — E78.00 PURE HYPERCHOLESTEROLEMIA: ICD-10-CM

## 2024-12-03 PROCEDURE — 99214 OFFICE O/P EST MOD 30 MIN: CPT | Performed by: FAMILY MEDICINE

## 2024-12-03 NOTE — PROGRESS NOTES
"Assessment/Plan:       Diagnoses and all orders for this visit:    Stage 3a chronic kidney disease (HCC)  -     Comprehensive metabolic panel; Future  -     CBC and differential; Future  -     Hemoglobin A1C; Future  -     Lipid panel; Future  -     TSH, 3rd generation with Free T4 reflex; Future    Pure hypercholesterolemia  -     Comprehensive metabolic panel; Future  -     CBC and differential; Future  -     Hemoglobin A1C; Future  -     Lipid panel; Future  -     TSH, 3rd generation with Free T4 reflex; Future    Elevated testosterone level  -     Testosterone, free, total; Future    Obesity (BMI 30-39.9)    Right elbow pain  -     Ambulatory Referral to Orthopedic Surgery; Future    Primary insomnia  -     Ambulatory Referral to Sleep Medicine; Future            Subjective:        Patient ID: Tony Corey is a 44 y.o. male.      Patient is here to follow-up on testosterone, hyperlipidemia chronic kidney disease as well as chronic right elbow pain.  Patient did see orthopedics.  Patient wishes get second opinion.          The following portions of the patient's history were reviewed and updated as appropriate: allergies, current medications, past family history, past medical history, past social history, past surgical history and problem list.      Review of Systems   Constitutional: Negative.    HENT: Negative.     Eyes: Negative.    Respiratory: Negative.     Cardiovascular: Negative.    Gastrointestinal: Negative.    Endocrine: Negative.    Genitourinary: Negative.    Musculoskeletal:  Positive for arthralgias.   Skin: Negative.    Allergic/Immunologic: Negative.    Neurological: Negative.    Hematological: Negative.    Psychiatric/Behavioral: Negative.             Objective:               /80 (BP Location: Left arm, Patient Position: Sitting, Cuff Size: Large)   Pulse 76   Temp (!) 97.4 °F (36.3 °C) (Temporal)   Ht 5' 6\" (1.676 m)   Wt 95.3 kg (210 lb 3.2 oz)   SpO2 97%   BMI 33.93 kg/m²          " Physical Exam  Vitals and nursing note reviewed.   Constitutional:       General: He is not in acute distress.     Appearance: Normal appearance. He is not ill-appearing, toxic-appearing or diaphoretic.   HENT:      Head: Normocephalic and atraumatic.      Right Ear: Tympanic membrane, ear canal and external ear normal. There is no impacted cerumen.      Left Ear: Tympanic membrane, ear canal and external ear normal. There is no impacted cerumen.      Nose: Nose normal. No congestion or rhinorrhea.      Mouth/Throat:      Mouth: Mucous membranes are moist.      Pharynx: No oropharyngeal exudate or posterior oropharyngeal erythema.   Eyes:      General: No scleral icterus.        Right eye: No discharge.         Left eye: No discharge.   Neck:      Vascular: No carotid bruit.   Cardiovascular:      Rate and Rhythm: Normal rate and regular rhythm.      Pulses: Normal pulses.      Heart sounds: Normal heart sounds. No murmur heard.     No friction rub. No gallop.   Pulmonary:      Effort: Pulmonary effort is normal. No respiratory distress.      Breath sounds: Normal breath sounds. No stridor. No wheezing, rhonchi or rales.   Chest:      Chest wall: No tenderness.   Abdominal:      General: Abdomen is flat. Bowel sounds are normal. There is no distension.      Palpations: Abdomen is soft.      Tenderness: There is no abdominal tenderness. There is no guarding or rebound.   Musculoskeletal:         General: Tenderness present. No swelling, deformity or signs of injury.      Cervical back: Normal range of motion and neck supple. No rigidity. No muscular tenderness.      Right lower leg: No edema.      Left lower leg: No edema.   Lymphadenopathy:      Cervical: No cervical adenopathy.   Skin:     General: Skin is warm and dry.      Capillary Refill: Capillary refill takes less than 2 seconds.      Coloration: Skin is not jaundiced.      Findings: No bruising, erythema, lesion or rash.   Neurological:      Mental Status:  He is alert and oriented to person, place, and time. Mental status is at baseline.      Cranial Nerves: No cranial nerve deficit.      Sensory: No sensory deficit.      Motor: No weakness.      Coordination: Coordination normal.      Gait: Gait normal.   Psychiatric:         Mood and Affect: Mood normal.         Behavior: Behavior normal.         Thought Content: Thought content normal.         Judgment: Judgment normal.

## 2024-12-10 ENCOUNTER — OFFICE VISIT (OUTPATIENT)
Dept: OBGYN CLINIC | Facility: CLINIC | Age: 44
End: 2024-12-10
Payer: COMMERCIAL

## 2024-12-10 VITALS — HEIGHT: 66 IN | WEIGHT: 210 LBS | BODY MASS INDEX: 33.75 KG/M2

## 2024-12-10 DIAGNOSIS — M70.21 OLECRANON BURSITIS OF RIGHT ELBOW: ICD-10-CM

## 2024-12-10 DIAGNOSIS — M77.8 TENDINITIS OF RIGHT TRICEPS: ICD-10-CM

## 2024-12-10 DIAGNOSIS — S46.311A STRAIN OF RIGHT TRICEPS TENDON, INITIAL ENCOUNTER: Primary | ICD-10-CM

## 2024-12-10 PROCEDURE — 99213 OFFICE O/P EST LOW 20 MIN: CPT | Performed by: STUDENT IN AN ORGANIZED HEALTH CARE EDUCATION/TRAINING PROGRAM

## 2024-12-10 NOTE — PROGRESS NOTES
ORTHOPAEDIC HAND, WRIST, AND ELBOW OFFICE  VISIT     ASSESSMENT/PLAN:    Tony Corey is a 44 y.o. RHD male who presents with high-grade insertional partial triceps tear, and olecranon bursitis of right elbow    - Previous imaging of right elbow X-rays and MRI reviewed in office today. Patient remains symptomatic of his partial triceps tendon tear and olecranon bursitis of the right elbow.     - Given 3 months of activity modification and rest I recommend he begin to resume his usual weight lifting regimen to determine if his period of rest and conservative treatment was beneficial. He should wear a compressive sleeve while lifting weight to prevent the olecranon bursitis from flaring up.    - If after returning to activities he has return of his pain and discomfort with weakness then I discussed with patient he ultimately may benefit from surgical intervention. We discussed that unless patient has severe worsening of pain and increased weakness compared to previous that a repeat MRI is unnecessary, but he will follow up after gradual return to activities and we can re-assess.     - We discussed the triceps tendon repair procedure does involve full detaching the triceps tendon, debriding the disease portion of the tendon followed by re-insertion to the triceps footprint of the olecranon via suture and anchor. He would then be immobilized for 6 weeks in an elbow ROM brace. Post-operative protocol discussed with wound check 1-2 weeks post-op: Dressing down. Hinged elbow brace, 0-90, increase flexion by 10 degrees weekly with repeat clinical evaluation at 6 weeks with plan to  initiate strengthening at that time.  I did re-iterate that functionally given chronicity of his injury that it will take up to a year for maximal functional recovery. I did explain that I will allow him to return to work immediately post-op in the confines of his post-op restrictions.     - He will follow-up in 6 weeks for re-evaluation of the  right elbow.    The patient verbalized understanding of exam findings and treatment plan. We engaged in the shared decision-making process and treatment options were discussed at length with the patient. Surgical and conservative management discussed today along with risks and benefits.    Follow Up:  6 weeks or sooner if symptoms fail to improve or worsen.    ____________________________________________________________________________________________________________________________________________      CHIEF COMPLAINT:  Right elbow pain    SUBJECTIVE:  Tony Corey is a 44 y.o. male who presents with posterior right elbow pain. This began nearly one year go. He initial sought treatment in August. An MRI of the right elbow was obtained in September, demonstrating a partial tear of the triceps insertion. He has reduced his weight lifting since the onset of pain to trial a course of rest and activity modification. He has done home exercises with some relief. He has not tried returning to full activities due to fear or re-injuring his triceps. He is also concerned about the olecranon bursitis and interested to hear about possible further interventions.   Radiation: None  Previous Treatments: steroid injections, NSAIDs, Tylenol, therapy, and activity modification with only partial relief  Associated symptoms: None  Handedness: right  Work status:     I have personally reviewed all the relevant PMH, PSH, SH, FH, Medications and allergies      PAST MEDICAL HISTORY:  Past Medical History:   Diagnosis Date    Epilepsy (HCC)     and recurrent seizures, one time age 12    Kidney stone        PAST SURGICAL HISTORY:  Past Surgical History:   Procedure Laterality Date    ANTERIOR CRUCIATE LIGAMENT REPAIR Right     FL RETROGRADE PYELOGRAM  5/15/2023    AL CYSTO BLADDER W/URETERAL CATHETERIZATION Left 5/15/2023    Procedure: CYSTOSCOPY RETROGRADE PYELOGRAM WITH URETEROSCOPY, STONE EXTRACTION,  INSERTION STENT  URETERAL LEFT;  Surgeon: Emanuel Capps MD;  Location: [facility];  Service: Urology       FAMILY HISTORY:  Family History   Problem Relation Age of Onset    Other Mother         liver transplant       SOCIAL HISTORY:  Social History     Tobacco Use    Smoking status: Never     Passive exposure: Never    Smokeless tobacco: Never   Vaping Use    Vaping status: Never Used   Substance Use Topics    Alcohol use: Yes     Comment: socially    Drug use: Never       MEDICATIONS:    Current Outpatient Medications:     Semaglutide-Weight Management (WEGOVY) 0.5 MG/0.5ML, Inject 0.5 mL (0.5 mg total) under the skin once a week (Patient not taking: Reported on 12/10/2024), Disp: 2 mL, Rfl: 2    ALLERGIES:  No Known Allergies        REVIEW OF SYSTEMS:  Pertinent items are noted in HPI.  A comprehensive review of systems was negative.    VITALS:  There were no vitals filed for this visit.    LABS:  HgA1c:   Lab Results   Component Value Date    HGBA1C 5.0 12/22/2020     BMP:   Lab Results   Component Value Date    CALCIUM 9.9 06/04/2024    K 3.7 06/04/2024    CO2 28 06/04/2024     06/04/2024    BUN 21 06/04/2024    CREATININE 1.36 (H) 06/04/2024       _____________________________________________________  PHYSICAL EXAMINATION:  General: well developed and well nourished, alert, oriented times 3, and appears comfortable  Psychiatric: Normal  HEENT: Normocephalic, Atraumatic Trachea Midline, No torticollis  Pulmonary: No audible wheezing or respiratory distress   Abdomen/GI: Non tender, non distended   Cardiovascular: No pitting edema, 2+ radial pulse   Skin: No masses, erythema, lacerations, fluctation, ulcerations  Neurovascular: Sensation Intact to the Median, Ulnar, Radial Nerve, Motor Intact to the Median, Ulnar, Radial Nerve, and Pulses Intact  Musculoskeletal: Normal, except as noted in detailed exam and in HPI.        FOCUSED MUSCULOSKELETAL EXAMINATION:  Right Upper Extremity  Inspection: skin intact, well  circumscribed mass about right elbow olecranon  Palpation: non-tender well circumscribed mobile mass about elbow olecranon bursa. Mild ttp about triceps insertion  Neurologic: 5/5 elbow flexion, 5/5 elbow extension, 5/5 wrist extension, 5/5 wrist flexion, 5/5 finger flexion, 5/5 finger extension, 5/5 FPL, 5/5 EPL, 5/5 APB, 5/5 intrinsics, sensation intact to median, radial, and ulnar nerve distributions  Vascular: Palpable radial pulse, brisk cap refill <2sec, hand warm and well perfused  MSK:   RIGHT SIDE:  Elbow:  ROM free and full, Tenderness triceps insertion, and Bursitis of olecranon  ___________________________________________________  STUDIES REVIEWED:  X-rays of the right elbow were obtained on 8/20/2024 were independently reviewed which demonstrates no acute osseous abnormalities or significant degenerative changes.    MRI of the right elbow was obtained on 9/3/2024 was independently reviewed which demonstrates tendinosis and partial-thickness tearing of the distal triceps insertion. Mild common flexor tendinosis.       LABS REVIEWED:    HgA1c:   Lab Results   Component Value Date    HGBA1C 5.0 12/22/2020     BMP:   Lab Results   Component Value Date    CALCIUM 9.9 06/04/2024    K 3.7 06/04/2024    CO2 28 06/04/2024     06/04/2024    BUN 21 06/04/2024    CREATININE 1.36 (H) 06/04/2024               PROCEDURES PERFORMED:  No Procedures performed today    _____________________________________________________      Scribe Attestation      I,:  Naila Cervantes am acting as a scribe while in the presence of the attending physician.:       I,:  Salvador Chan MD personally performed the services described in this documentation    as scribed in my presence.:               I agree with the history, physical examination, assessment and plan of care as documented above.    Salvador Chan M.D.  Attending, Orthopaedic Surgery  Hand, Wrist, and Elbow Surgery  [facility]

## 2025-01-02 ENCOUNTER — TELEPHONE (OUTPATIENT)
Dept: NEPHROLOGY | Facility: CLINIC | Age: 45
End: 2025-01-02

## 2025-01-02 NOTE — TELEPHONE ENCOUNTER
Called patient and spoke with regarding a follow up with Dr. Mccarthy from [facility].   I scheduled in [place], Wednesday 1/15/2025 at 3:30 Pm.  Reminded of labs.

## 2025-01-09 ENCOUNTER — OFFICE VISIT (OUTPATIENT)
Age: 45
End: 2025-01-09
Payer: COMMERCIAL

## 2025-01-09 VITALS
WEIGHT: 211.6 LBS | DIASTOLIC BLOOD PRESSURE: 80 MMHG | OXYGEN SATURATION: 97 % | BODY MASS INDEX: 34.01 KG/M2 | SYSTOLIC BLOOD PRESSURE: 124 MMHG | HEIGHT: 66 IN | HEART RATE: 68 BPM | TEMPERATURE: 97.3 F

## 2025-01-09 DIAGNOSIS — J01.00 ACUTE NON-RECURRENT MAXILLARY SINUSITIS: Primary | ICD-10-CM

## 2025-01-09 PROCEDURE — 99213 OFFICE O/P EST LOW 20 MIN: CPT | Performed by: FAMILY MEDICINE

## 2025-01-09 RX ORDER — AZITHROMYCIN 250 MG/1
TABLET, FILM COATED ORAL
Qty: 6 TABLET | Refills: 0 | Status: SHIPPED | OUTPATIENT
Start: 2025-01-09 | End: 2025-01-13

## 2025-01-09 NOTE — PROGRESS NOTES
"Assessment/Plan:       Diagnoses and all orders for this visit:    Acute non-recurrent maxillary sinusitis  Comments:  Patient is Z-Nick as directed.  Orders:  -     azithromycin (ZITHROMAX) 250 mg tablet; Take 2 tablets today then 1 tablet daily x 4 days            Subjective:        Patient ID: Tony Corey is a 44 y.o. male.      Patient is here for sinus related issues.  Using ibuprofen and Flonase as well as Mucinex.  Fever patient with low-grade temps as well as cough.  No vomiting or diarrhea.  Patient with history of sinus issues    Sinusitis  Associated symptoms include congestion and coughing.         The following portions of the patient's history were reviewed and updated as appropriate: allergies, current medications, past family history, past medical history, past social history, past surgical history and problem list.      Review of Systems   Constitutional:  Positive for fever.   HENT:  Positive for congestion, rhinorrhea and sinus pain.    Eyes: Negative.    Respiratory:  Positive for cough.    Cardiovascular: Negative.    Gastrointestinal: Negative.    Endocrine: Negative.    Genitourinary: Negative.    Musculoskeletal: Negative.    Skin: Negative.    Allergic/Immunologic: Negative.    Neurological: Negative.    Hematological: Negative.    Psychiatric/Behavioral: Negative.             Objective:        Depression Screening and Follow-up Plan: Patient was screened for depression during today's encounter. They screened negative with a PHQ-2 score of 0.            /80 (BP Location: Right arm, Patient Position: Sitting, Cuff Size: Large)   Pulse 68   Temp (!) 97.3 °F (36.3 °C) (Temporal)   Ht 5' 6\" (1.676 m)   Wt 96 kg (211 lb 9.6 oz)   SpO2 97%   BMI 34.15 kg/m²          Physical Exam  Vitals and nursing note reviewed.   Constitutional:       General: He is not in acute distress.     Appearance: Normal appearance. He is not ill-appearing, toxic-appearing or diaphoretic.   HENT:      Head: " Normocephalic and atraumatic.      Right Ear: Tympanic membrane, ear canal and external ear normal. There is no impacted cerumen.      Left Ear: Tympanic membrane, ear canal and external ear normal. There is no impacted cerumen.      Nose: Nose normal. No congestion or rhinorrhea.      Mouth/Throat:      Mouth: Mucous membranes are moist.      Pharynx: Oropharyngeal exudate present. No posterior oropharyngeal erythema.   Eyes:      General: No scleral icterus.        Right eye: No discharge.         Left eye: No discharge.   Neck:      Vascular: No carotid bruit.   Cardiovascular:      Rate and Rhythm: Normal rate and regular rhythm.      Pulses: Normal pulses.      Heart sounds: Normal heart sounds. No murmur heard.     No friction rub. No gallop.   Pulmonary:      Effort: Pulmonary effort is normal. No respiratory distress.      Breath sounds: Normal breath sounds. No stridor. No wheezing, rhonchi or rales.   Chest:      Chest wall: No tenderness.   Musculoskeletal:         General: No swelling, tenderness, deformity or signs of injury. Normal range of motion.      Cervical back: Normal range of motion and neck supple. No rigidity. No muscular tenderness.      Right lower leg: No edema.      Left lower leg: No edema.   Lymphadenopathy:      Cervical: No cervical adenopathy.   Skin:     General: Skin is warm and dry.      Capillary Refill: Capillary refill takes less than 2 seconds.      Coloration: Skin is not jaundiced.      Findings: No bruising, erythema, lesion or rash.   Neurological:      Mental Status: He is alert and oriented to person, place, and time.      Cranial Nerves: No cranial nerve deficit.      Sensory: No sensory deficit.      Motor: No weakness.      Coordination: Coordination normal.      Gait: Gait normal.   Psychiatric:         Mood and Affect: Mood normal.         Behavior: Behavior normal.         Thought Content: Thought content normal.         Judgment: Judgment normal.

## 2025-01-13 ENCOUNTER — APPOINTMENT (OUTPATIENT)
Dept: LAB | Facility: HOSPITAL | Age: 45
End: 2025-01-13
Payer: COMMERCIAL

## 2025-01-13 DIAGNOSIS — N20.0 NEPHROLITHIASIS: ICD-10-CM

## 2025-01-13 DIAGNOSIS — E78.00 PURE HYPERCHOLESTEROLEMIA: ICD-10-CM

## 2025-01-13 DIAGNOSIS — N18.2 CKD (CHRONIC KIDNEY DISEASE) STAGE 2, GFR 60-89 ML/MIN: ICD-10-CM

## 2025-01-13 DIAGNOSIS — N18.31 STAGE 3A CHRONIC KIDNEY DISEASE (HCC): ICD-10-CM

## 2025-01-13 DIAGNOSIS — E66.9 OBESITY (BMI 30-39.9): ICD-10-CM

## 2025-01-13 DIAGNOSIS — R79.89 ELEVATED TESTOSTERONE LEVEL: ICD-10-CM

## 2025-01-13 LAB
25(OH)D3 SERPL-MCNC: 21.4 NG/ML (ref 30–100)
ALBUMIN SERPL BCG-MCNC: 4.4 G/DL (ref 3.5–5)
ALP SERPL-CCNC: 71 U/L (ref 34–104)
ALT SERPL W P-5'-P-CCNC: 41 U/L (ref 7–52)
ANION GAP SERPL CALCULATED.3IONS-SCNC: 7 MMOL/L (ref 4–13)
AST SERPL W P-5'-P-CCNC: 26 U/L (ref 13–39)
BASOPHILS # BLD AUTO: 0.03 THOUSANDS/ΜL (ref 0–0.1)
BASOPHILS NFR BLD AUTO: 0 % (ref 0–1)
BILIRUB SERPL-MCNC: 0.82 MG/DL (ref 0.2–1)
BUN SERPL-MCNC: 16 MG/DL (ref 5–25)
CALCIUM SERPL-MCNC: 9.7 MG/DL (ref 8.4–10.2)
CHLORIDE SERPL-SCNC: 100 MMOL/L (ref 96–108)
CHOLEST SERPL-MCNC: 200 MG/DL (ref ?–200)
CO2 SERPL-SCNC: 31 MMOL/L (ref 21–32)
CREAT SERPL-MCNC: 1.34 MG/DL (ref 0.6–1.3)
CREAT UR-MCNC: 27.9 MG/DL
EOSINOPHIL # BLD AUTO: 0.3 THOUSAND/ΜL (ref 0–0.61)
EOSINOPHIL NFR BLD AUTO: 5 % (ref 0–6)
ERYTHROCYTE [DISTWIDTH] IN BLOOD BY AUTOMATED COUNT: 11.7 % (ref 11.6–15.1)
EST. AVERAGE GLUCOSE BLD GHB EST-MCNC: 108 MG/DL
GFR SERPL CREATININE-BSD FRML MDRD: 63 ML/MIN/1.73SQ M
GLUCOSE P FAST SERPL-MCNC: 110 MG/DL (ref 65–99)
HBA1C MFR BLD: 5.4 %
HCT VFR BLD AUTO: 50.3 % (ref 36.5–49.3)
HDLC SERPL-MCNC: 37 MG/DL
HGB BLD-MCNC: 17.2 G/DL (ref 12–17)
IMM GRANULOCYTES # BLD AUTO: 0.05 THOUSAND/UL (ref 0–0.2)
IMM GRANULOCYTES NFR BLD AUTO: 1 % (ref 0–2)
LDLC SERPL CALC-MCNC: 137 MG/DL (ref 0–100)
LYMPHOCYTES # BLD AUTO: 1.85 THOUSANDS/ΜL (ref 0.6–4.47)
LYMPHOCYTES NFR BLD AUTO: 27 % (ref 14–44)
MCH RBC QN AUTO: 32.1 PG (ref 26.8–34.3)
MCHC RBC AUTO-ENTMCNC: 34.2 G/DL (ref 31.4–37.4)
MCV RBC AUTO: 94 FL (ref 82–98)
MICROALBUMIN UR-MCNC: <7 MG/L
MONOCYTES # BLD AUTO: 0.5 THOUSAND/ΜL (ref 0.17–1.22)
MONOCYTES NFR BLD AUTO: 7 % (ref 4–12)
NEUTROPHILS # BLD AUTO: 4.01 THOUSANDS/ΜL (ref 1.85–7.62)
NEUTS SEG NFR BLD AUTO: 60 % (ref 43–75)
NONHDLC SERPL-MCNC: 163 MG/DL
NRBC BLD AUTO-RTO: 0 /100 WBCS
PHOSPHATE SERPL-MCNC: 3.3 MG/DL (ref 2.7–4.5)
PLATELET # BLD AUTO: 198 THOUSANDS/UL (ref 149–390)
PMV BLD AUTO: 11 FL (ref 8.9–12.7)
POTASSIUM SERPL-SCNC: 4.5 MMOL/L (ref 3.5–5.3)
PROT SERPL-MCNC: 7.4 G/DL (ref 6.4–8.4)
PTH-INTACT SERPL-MCNC: 34.7 PG/ML (ref 12–88)
RBC # BLD AUTO: 5.36 MILLION/UL (ref 3.88–5.62)
SODIUM SERPL-SCNC: 138 MMOL/L (ref 135–147)
TRIGL SERPL-MCNC: 129 MG/DL (ref ?–150)
TSH SERPL DL<=0.05 MIU/L-ACNC: 1.66 UIU/ML (ref 0.45–4.5)
WBC # BLD AUTO: 6.74 THOUSAND/UL (ref 4.31–10.16)

## 2025-01-13 PROCEDURE — 84402 ASSAY OF FREE TESTOSTERONE: CPT

## 2025-01-13 PROCEDURE — 83036 HEMOGLOBIN GLYCOSYLATED A1C: CPT

## 2025-01-13 PROCEDURE — 84443 ASSAY THYROID STIM HORMONE: CPT

## 2025-01-13 PROCEDURE — 84100 ASSAY OF PHOSPHORUS: CPT

## 2025-01-13 PROCEDURE — 83970 ASSAY OF PARATHORMONE: CPT

## 2025-01-13 PROCEDURE — 82306 VITAMIN D 25 HYDROXY: CPT

## 2025-01-13 PROCEDURE — 36415 COLL VENOUS BLD VENIPUNCTURE: CPT

## 2025-01-13 PROCEDURE — 82043 UR ALBUMIN QUANTITATIVE: CPT

## 2025-01-13 PROCEDURE — 84403 ASSAY OF TOTAL TESTOSTERONE: CPT

## 2025-01-13 PROCEDURE — 80061 LIPID PANEL: CPT

## 2025-01-13 PROCEDURE — 85025 COMPLETE CBC W/AUTO DIFF WBC: CPT

## 2025-01-13 PROCEDURE — 80053 COMPREHEN METABOLIC PANEL: CPT

## 2025-01-13 PROCEDURE — 82570 ASSAY OF URINE CREATININE: CPT

## 2025-01-14 ENCOUNTER — RESULTS FOLLOW-UP (OUTPATIENT)
Dept: NEPHROLOGY | Facility: CLINIC | Age: 45
End: 2025-01-14

## 2025-01-15 ENCOUNTER — OFFICE VISIT (OUTPATIENT)
Dept: NEPHROLOGY | Facility: CLINIC | Age: 45
End: 2025-01-15
Payer: COMMERCIAL

## 2025-01-15 VITALS
BODY MASS INDEX: 33.11 KG/M2 | SYSTOLIC BLOOD PRESSURE: 124 MMHG | HEIGHT: 66 IN | DIASTOLIC BLOOD PRESSURE: 72 MMHG | WEIGHT: 206 LBS

## 2025-01-15 DIAGNOSIS — E66.811 CLASS 1 OBESITY DUE TO EXCESS CALORIES WITH SERIOUS COMORBIDITY AND BODY MASS INDEX (BMI) OF 34.0 TO 34.9 IN ADULT: ICD-10-CM

## 2025-01-15 DIAGNOSIS — E66.09 CLASS 1 OBESITY DUE TO EXCESS CALORIES WITH SERIOUS COMORBIDITY AND BODY MASS INDEX (BMI) OF 34.0 TO 34.9 IN ADULT: ICD-10-CM

## 2025-01-15 DIAGNOSIS — N18.2 CKD (CHRONIC KIDNEY DISEASE) STAGE 2, GFR 60-89 ML/MIN: Primary | ICD-10-CM

## 2025-01-15 DIAGNOSIS — R80.1 PERSISTENT PROTEINURIA: ICD-10-CM

## 2025-01-15 DIAGNOSIS — N20.0 NEPHROLITHIASIS: ICD-10-CM

## 2025-01-15 DIAGNOSIS — N18.9 CHRONIC KIDNEY DISEASE-MINERAL AND BONE DISORDER (CKD-MBD): ICD-10-CM

## 2025-01-15 DIAGNOSIS — E78.00 PURE HYPERCHOLESTEROLEMIA: ICD-10-CM

## 2025-01-15 DIAGNOSIS — E83.9 CHRONIC KIDNEY DISEASE-MINERAL AND BONE DISORDER (CKD-MBD): ICD-10-CM

## 2025-01-15 DIAGNOSIS — M89.9 CHRONIC KIDNEY DISEASE-MINERAL AND BONE DISORDER (CKD-MBD): ICD-10-CM

## 2025-01-15 PROBLEM — N18.31 STAGE 3A CHRONIC KIDNEY DISEASE (HCC): Status: RESOLVED | Noted: 2023-12-13 | Resolved: 2025-01-15

## 2025-01-15 PROBLEM — R79.89 ELEVATED SERUM CREATININE: Status: RESOLVED | Noted: 2023-05-15 | Resolved: 2025-01-15

## 2025-01-15 PROBLEM — E66.9 OBESITY (BMI 30-39.9): Status: RESOLVED | Noted: 2018-03-01 | Resolved: 2025-01-15

## 2025-01-15 LAB
TESTOST FREE SERPL-MCNC: 23.5 PG/ML (ref 6.8–21.5)
TESTOST SERPL-MCNC: 908 NG/DL (ref 264–916)

## 2025-01-15 PROCEDURE — 99213 OFFICE O/P EST LOW 20 MIN: CPT | Performed by: INTERNAL MEDICINE

## 2025-01-15 NOTE — ASSESSMENT & PLAN NOTE
likely has risk for underlying proteinuria due to obesity related hyperfiltration  most recent MACR is too small to be quantified as of January 2025  Recheck MACR prior to next visit  For proteinuria reduction continue on no medications for now.  Recommend starting vitamin D

## 2025-01-15 NOTE — ASSESSMENT & PLAN NOTE
Lab Results   Component Value Date    EGFR 63 01/13/2025    EGFR 61 06/04/2024    EGFR 53 12/13/2023    CREATININE 1.34 (H) 01/13/2025    CREATININE 1.36 (H) 06/04/2024    CREATININE 1.54 (H) 12/13/2023     Based on patients CKD stage following is the goal of therapy.  Maintain calcium phosphorus product of < 55.  Currently on: No  Changes made today: Start vitamin D 2000 units daily over-the-counter  most recent ipth 34.7 and vit D 21.4 as of 1/13/2025  Follow-up with PCP

## 2025-01-15 NOTE — ASSESSMENT & PLAN NOTE
Discussed with the patient that the most common types of kidney stones are calcium oxalate stones.   Advised to follow a diet with increased fluid intake so that urine output is greater than 2-2.5L/day.  Advised patient to decrease salt, protein and oxalate intake.   Dietary handouts given.  Stone analysis 5/15/23, 60% calcium oxalate monohydrate  Status post left ureteroscopy stone extraction 5/15/2023  Last 2 visits had requested for 24-hour urine still not completed  Patient wishes to hold off agree and respect his decisions.

## 2025-01-15 NOTE — PROGRESS NOTES
Name: Tony Corey      : 1980      MRN: [id number]  Encounter Provider: Shanice Mccarthy MD  Encounter Date: 1/15/2025   Encounter department: [facility]  :44y.o.  male with pmh of multiple co-morbidities including obesity, arthritis and nephrolithiasis presents to the office for routine follow-up .       Assessment & Plan  CKD (chronic kidney disease) stage 2, GFR 60-89 ml/min  Lab Results   Component Value Date    EGFR 63 2025    EGFR 61 2024    EGFR 53 2023    CREATININE 1.34 (H) 2025    CREATININE 1.36 (H) 2024    CREATININE 1.54 (H) 2023   after review of records In [facility] as well as Care everywhere patient has a baseline creatinine of 1.2-1.6 mg/dL dating as far back as 2019.   Most recent labs show a Creatinine of 1.34 mg/dL on 2025. Renal function remains stable.   Likely has underlying CKD due to nephrolithiasis plus obesity due to hyperfiltration plus some degree of underestimation of renal function secondary to muscular build.  Holding off on Cystatin C as previously was ordered and not checked.  Other than history of nephrolithiasis would not call patient is having CKD.  He does have baseline elevated creatinine secondary to his muscular body habitus.  Imaging:   Renal ultrasound 2023 bilateral kidneys 11.5 cm no hydronephrosis.  Left-sided hydronephrosis resolved  Recommend to avoid use of NSAIDs, nephrotoxins. Caution advised with regards to exposure to IV contrast dye.   Discussed with the patient in depth his renal status, including the possible etiologies for CKD.   Advised the patient that when his GFR is close to 20mL/min then will start discussing about RRT(renal replacement therapy) options such as renal transplant, peritoneal dialysis and hemodialysis.   Informed the patient about the various options for Renal Replacement therapy.  Discussed with the patient how we need to work together to delay the progression  of CKD with optimal BP control based on their age and co-morbidities, optimal BS control with HbA1c of <7% and trying to reduce proteinuria by the use of anti-proteinuric agents.   CKD education/Kidney smart: Not applicable at this time  Follow-up: Patient is to follow-up prn.  Can continue routine follow-up with PCP       Nephrolithiasis  Discussed with the patient that the most common types of kidney stones are calcium oxalate stones.   Advised to follow a diet with increased fluid intake so that urine output is greater than 2-2.5L/day.  Advised patient to decrease salt, protein and oxalate intake.   Dietary handouts given.  Stone analysis 5/15/23, 60% calcium oxalate monohydrate  Status post left ureteroscopy stone extraction 5/15/2023  Last 2 visits had requested for 24-hour urine still not completed  Patient wishes to hold off agree and respect his decisions.       Chronic kidney disease-mineral and bone disorder (CKD-MBD)  Lab Results   Component Value Date    EGFR 63 01/13/2025    EGFR 61 06/04/2024    EGFR 53 12/13/2023    CREATININE 1.34 (H) 01/13/2025    CREATININE 1.36 (H) 06/04/2024    CREATININE 1.54 (H) 12/13/2023     Based on patients CKD stage following is the goal of therapy.  Maintain calcium phosphorus product of < 55.  Currently on: No  Changes made today: Start vitamin D 2000 units daily over-the-counter  most recent ipth 34.7 and vit D 21.4 as of 1/13/2025  Follow-up with PCP       Persistent proteinuria  likely has risk for underlying proteinuria due to obesity related hyperfiltration  most recent MACR is too small to be quantified as of January 2025  Recheck MACR prior to next visit  For proteinuria reduction continue on no medications for now.  Recommend starting vitamin D       Pure hypercholesterolemia  Goal LDL less than 70  Currently no medications for now  Consider statin initiation with PCP       Class 1 obesity due to excess calories with serious comorbidity and body mass index (BMI)  "of 34.0 to 34.9 in adult  Encouraged patient to follow a renal diet comprising of moderate potassium, low phosphorus and protein restriction to 0.8gm/kg.  Dietary handouts provided  Encouraged weight loss.             History of Present Illness   HPI  Tony Corey is a 44 y.o. male who presents to the office for routine follow-up no overt complaints no recent hospitalization no issues with edema occasionally takes ibuprofen once a month encourage patient to hold off and avoid if tolerable.  No recent kidney stones has been drinking a lot of water advised patient to follow-up with PCP since he wishes to hold off on 24-hour urine workup for stones at this time as he only had 1 episode of stones.  Discussed with patient he likely has elevated creatinine based on his muscular body habitus no overt signs of CKD unless we account for his 1 episode of kidney stones.      Review of Systems   Constitutional:  Negative for chills and fever.   HENT:  Negative for congestion.    Respiratory:  Negative for cough and wheezing.    Cardiovascular:  Negative for leg swelling.   Gastrointestinal:  Negative for constipation.   Genitourinary:  Negative for dysuria and hematuria.   Musculoskeletal:  Negative for back pain.   Neurological:  Negative for dizziness and headaches.   Psychiatric/Behavioral:  Negative for agitation and confusion.    All other systems reviewed and are negative.         Objective   /72 (BP Location: Left arm, Patient Position: Sitting, Cuff Size: Large)   Ht 5' 6\" (1.676 m)   Wt 93.4 kg (206 lb)   BMI 33.25 kg/m²      Physical Exam  Vitals reviewed.   Constitutional:       General: He is not in acute distress.     Appearance: Normal appearance. He is obese. He is not ill-appearing, toxic-appearing or diaphoretic.   HENT:      Head: Normocephalic and atraumatic.      Mouth/Throat:      Mouth: Mucous membranes are moist.      Pharynx: No oropharyngeal exudate.   Eyes:      General: No scleral " icterus.  Cardiovascular:      Rate and Rhythm: Normal rate.   Pulmonary:      Effort: No respiratory distress.      Breath sounds: Normal breath sounds. No stridor. No wheezing.   Abdominal:      General: There is no distension.      Palpations: There is no mass.      Tenderness: There is no right CVA tenderness or left CVA tenderness.   Musculoskeletal:         General: No swelling.      Cervical back: Normal range of motion. No rigidity.   Skin:     Coloration: Skin is not jaundiced.   Neurological:      General: No focal deficit present.      Mental Status: He is alert and oriented to person, place, and time. Mental status is at baseline.   Psychiatric:         Mood and Affect: Mood normal.         Behavior: Behavior normal.

## 2025-01-15 NOTE — ASSESSMENT & PLAN NOTE
Encouraged patient to follow a renal diet comprising of moderate potassium, low phosphorus and protein restriction to 0.8gm/kg.  Dietary handouts provided  Encouraged weight loss.

## 2025-01-15 NOTE — PATIENT INSTRUCTIONS
"Start vit D daily 2000 units daily      Kidney Stone Prevention    Fluid Intake    A high fluid intake is one of the most important cornerstones of kidney stone dietary prevention. A sufficiently dilute urine will prevent the individual chemical components of stones from becoming concentrated enough to precipitate out of solution, keeping them instead in their dissolved state. A high urine output also may reduce stone from forming through \"flushing\" out of stone components and prevention of urine stagnation. In addition to stone benefits, increased water intake has been shown to have a multitude of other benefits, including improved alertness, better skin appearance, enhanced physical performance, reduced constipation and enhanced weight loss.    The average daily urine output for normal healthy adults is 1.2 liters a day, ranging from 1 to 2 liters in most individuals and varying with body weight and gender. In stone formers, however, a higher daily urine output is required for stone prevention and achieving a daily volume of at least 2.0 to 2.5 liters a day can significantly reduce the recurrence of future stones. In a randomized study of stone formers who were given specific instructions to increase their fluid intake compared to stone formers told to not change their diet, those given specific fluid instructions achieved a high urine output of 2.6 liters a day versus 1.0 liters a day in those not given dietary instructions. Over a period of five years, the high fluid intake group was half as likely to form new stones as compared to the normal fluid intake group (Holli et al, J Urol 1996).    We recommend that most stone formers increase their daily fluid intake by one liter (an additional two 16 oz water bottles or two tall glasses a day) in order to achieve a urine output of 2.5 liters a day. (One liter = 4.2 8-oz glasses or 34 oz). Alternatively, a 24 hour urine collection can be performed to guide fluid " intake to achieve 2.5 liters of urine output in a specific patient.    Type of Fluid Intake    The type of fluid intake is generally less important than the total intake. While drinking water is our preferred recommendation because it is inexpensive and contains no calories, for stone patients who do not enjoy drinking water, any beverage will be beneficial in reducing stone risk.    Contrary to popular belief, studies have found that an increased intake of tea, coffee, and alcoholic beverage actually reduces the risk of stones, possibly because of an associated increase in urine output (Katharine et al, Am J of Epidemiology, 1996). While tea contains high levels of oxalate, this does not appear to result in increased stone formation for the reasons discussed below in discussion on oxalate.    Soda intake (including tesfaye) and milk intake also do not appear to increase the risk of stones.    Citrus Fruit Juices    Citrus juices, including lemon juice and orange juice, contain citrate, which acts as a stone inhibitor for calcium based stones. Citrate seems to do this by binding calcium, making it unavailable to combine with oxalate or phosphate: a necessary first step in the formation of stones. Citrate also seems to make it more difficult for stones to grow once they've formed.    Drinking citrus juice in the form of concentrated lemon juice mixed with water has been shown to effectively increase urinary citrate levels and reduce urinary calcium levels, both of which will reduce stone risk. Orange juice will similarly increase urinary citrate levels. However, orange juice appears to also increase urinary oxalate levels ( a stone promoter). Other sources of citrate, including grapefruit juice, have had less research completed confirming their beneficial effects on urinary citrate levels. Therefore, lemon juice is typically favored over the other citrus juices as a natural method to increase urinary citrate levels. Many  patients find drinking citrus juices to be an attractive alternative to pharmaceutical treatment with potassium citrate.    We recommend that stone formers consider supplementing their daily fluid intake with a mixture of 60 ml of concentrated lemon juice in one liter of water to increase their urinary citrate levels.    Salt    A high sodium intake increases the risk of stone formation by increasing calcium levels and decreasing citrate (a stone inhibitor) levels in urine. Additionally, high sodium intake will impair the ability of medications such as hydrochlorothiazide to effectively reduce calcium levels in urine. A study of stone formers who were kept on a strict diet with a maximum daily sodium intake of 50 mmol (1200 mg) in addition to a reduced protein diet demonstrated that the low sodium diet was effective in reducing stone recurrence by 50% as compared to the low calcium diet.    We recommend that stone formers aim to follow the FDA's guideline of limiting salt intake to 2300 mg of sodium a day in the general population and 1500 mg of sodium a day in those with hypertension,  Americans, or middle aged and older adults. 2300 mg is equivalent to about 1 teaspoon of table salt.    The best way to determine the salt content of your food is to read the nutrition label. Processed foods tend to contain higher amounts of salt. Choose low sodium options whenever possible.    1 cup of canned chicken noodle soup contains 870 mg of sodium.  A fried chicken drumstick contains 310 mg of sodium.  A serving of shrimp contains 240 mg of sodium.  2 slices of white bread contains 200 mg of sodium.  15 potato chips contain 180 mg of sodium.  1 container of strawberry yogurt contains 85 mg of sodium.  1 tomato contains 20 mg of sodium.  1 apple contains 0 mg of sodium.    In addition to lowering the risk of stones, a low sodium intake helps to control or prevent high blood pressure, which can lead to heart disease,  stroke, heart failure, and kidney disease.    Animal Protein    Animal protein in meat products increases the risk of stone by increasing calcium, oxalate, and uric acid levels in urine. All three of these changes increase the risk of stones. In studies comparing high meat eaters versus low meat eaters, high meat eaters were found to be at increased risk of forming stones. A randomized study of stone formers restricted to a low meat intake of 52 grams a day (equivalent to 8 oz of beef) in combination with sodium restriction found that the combination reduced stone recurrence by 50% compared to calcium restriction alone (Holli et al, [facility] 2002).    We recommend that most stone formers try to reduce their meat intake to 6 oz a day. This includes all types of meat: beef, pork, poultry, and seafood.    The USDA has recommended a daily allowance of 5-6 oz of protein intake among adults. They also recommend choosing non-meat protein foods such as nuts and beans instead of meat sources. Protein from non-meat sources does not appear to increase the risk of stones.    A small steak contains about 3-4 oz of protein.  A quarter pound hamburger with cheese contains 4 oz of protein.  A chicken breast contains about 5 oz of protein, a chicken thigh about 2.5 oz, a chicken drumstick about 1.5 oz.  One 5 oz can of tuna contains 5 oz of protein.  1 medium egg contains 1 oz of protein.    Lowering your animal protein intake and eating more fruits and vegetables also benefits your overall health by limiting the amount of saturated fats and cholesterol in your diet. This helps to reduce your risk of cardiovascular disease.    Oxalate    While oxalate plays an important role in the development of calcium oxalate stones, dietary restriction does not appear to be effective in reducing the risk of stones in the majority of patients. About 40% of urinary oxalate comes from dietary sources while the remainder is naturally made within the  liver. Therefore, reducing oxalate dietary intake does not always have a significant impact on total urinary oxalate levels.    Oxalate is found in many vegetable and fruits, including many healthy dietary choices often making it difficult to achieve a low oxalate diet. Because of these issues, oxalate avoidance is beneficial primarily in those individuals with specific abnormalities that lead to high oxalate urinary levels.    We recommend that most stone formers should maintain a normal oxalate intake without the need for oxalate restriction. High oxalate intake should be avoided in individuals found to have high urinary oxalate levels on metabolic evaluation. Oxalate restriction may be beneficial in certain individuals with high urinary oxalate levels.    Oxalate Rich Foods    Tea (black)  Spinach  Mustard Greens  Chard  Beets  Rhubarb  Okra  Berries  Chocolate  Nuts  Sweet Potatoes        Calcium    Kidney Stone formers often question whether to stop or reduce their calcium intake. Despite the fact that calcium is a major component of 75% of stones, excessive calcium intake is vary rarely the cause of stone formation. In fact, several studies have shown that restricting calcium intake in most stone formers actually increases the number of stones they develop. This appears to happen because when less calcium is ingested, it becomes easier for oxalate (which normally binds with calcium in the gut) to be absorbed. Higher levels of oxalate in the urine then lead to an increase in stone risk. Calcium obtained from dietary sources appears to be better than calcium from supplements in regards to lowering stone risk because supplements can actually increase your risk of stones slightly (by 17%) while dietary calcium intake is instead associated with lower stone risk. If you need to take supplements, taking them during meals appear to be better in terms of stone risk.    We recommend that stone formers maintain a normal  calcium intake, preferably from dietary sources. Female stone formers taking calcium supplementation to prevent osteoporosis experience a slightly increased risk of stones (17%) which needs to be balanced against their risk of osteoporosis.

## 2025-01-15 NOTE — ASSESSMENT & PLAN NOTE
Lab Results   Component Value Date    EGFR 63 01/13/2025    EGFR 61 06/04/2024    EGFR 53 12/13/2023    CREATININE 1.34 (H) 01/13/2025    CREATININE 1.36 (H) 06/04/2024    CREATININE 1.54 (H) 12/13/2023   after review of records In [facility] as well as Care everywhere patient has a baseline creatinine of 1.2-1.6 mg/dL dating as far back as 2019.   Most recent labs show a Creatinine of 1.34 mg/dL on 1/13/2025. Renal function remains stable.   Likely has underlying CKD due to nephrolithiasis plus obesity due to hyperfiltration plus some degree of underestimation of renal function secondary to muscular build.  Holding off on Cystatin C as previously was ordered and not checked.  Other than history of nephrolithiasis would not call patient is having CKD.  He does have baseline elevated creatinine secondary to his muscular body habitus.  Imaging:   Renal ultrasound 5/31/2023 bilateral kidneys 11.5 cm no hydronephrosis.  Left-sided hydronephrosis resolved  Recommend to avoid use of NSAIDs, nephrotoxins. Caution advised with regards to exposure to IV contrast dye.   Discussed with the patient in depth his renal status, including the possible etiologies for CKD.   Advised the patient that when his GFR is close to 20mL/min then will start discussing about RRT(renal replacement therapy) options such as renal transplant, peritoneal dialysis and hemodialysis.   Informed the patient about the various options for Renal Replacement therapy.  Discussed with the patient how we need to work together to delay the progression of CKD with optimal BP control based on their age and co-morbidities, optimal BS control with HbA1c of <7% and trying to reduce proteinuria by the use of anti-proteinuric agents.   CKD education/Kidney smart: Not applicable at this time  Follow-up: Patient is to follow-up prn.  Can continue routine follow-up with PCP

## 2025-01-21 ENCOUNTER — OFFICE VISIT (OUTPATIENT)
Dept: SLEEP CENTER | Facility: CLINIC | Age: 45
End: 2025-01-21
Payer: COMMERCIAL

## 2025-01-21 ENCOUNTER — OFFICE VISIT (OUTPATIENT)
Dept: OBGYN CLINIC | Facility: CLINIC | Age: 45
End: 2025-01-21
Payer: COMMERCIAL

## 2025-01-21 VITALS
BODY MASS INDEX: 33.75 KG/M2 | DIASTOLIC BLOOD PRESSURE: 84 MMHG | SYSTOLIC BLOOD PRESSURE: 121 MMHG | WEIGHT: 210 LBS | HEIGHT: 66 IN

## 2025-01-21 VITALS — BODY MASS INDEX: 33.75 KG/M2 | HEIGHT: 66 IN | WEIGHT: 210 LBS

## 2025-01-21 DIAGNOSIS — M70.21 OLECRANON BURSITIS OF RIGHT ELBOW: ICD-10-CM

## 2025-01-21 DIAGNOSIS — R29.818 SUSPECTED SLEEP APNEA: Primary | ICD-10-CM

## 2025-01-21 DIAGNOSIS — S46.311D: Primary | ICD-10-CM

## 2025-01-21 DIAGNOSIS — G47.19 EXCESSIVE DAYTIME SLEEPINESS: ICD-10-CM

## 2025-01-21 DIAGNOSIS — Z79.890 LONG-TERM CURRENT USE OF TESTOSTERONE REPLACEMENT THERAPY: ICD-10-CM

## 2025-01-21 DIAGNOSIS — F51.01 PRIMARY INSOMNIA: ICD-10-CM

## 2025-01-21 DIAGNOSIS — R06.83 SNORING: ICD-10-CM

## 2025-01-21 PROCEDURE — 99213 OFFICE O/P EST LOW 20 MIN: CPT | Performed by: STUDENT IN AN ORGANIZED HEALTH CARE EDUCATION/TRAINING PROGRAM

## 2025-01-21 PROCEDURE — 99204 OFFICE O/P NEW MOD 45 MIN: CPT | Performed by: PSYCHIATRY & NEUROLOGY

## 2025-01-21 NOTE — PROGRESS NOTES
Name: Tony Corey      : 1980      MRN: [id number]  Encounter Provider: John Jonas MD  Encounter Date: 2025   Encounter department: [facility] SLEEP MEDICINE DON  :  Assessment & Plan  Suspected sleep apnea  -Suspect he has obstructive sleep apnea as a contributor to poor quality sleep and daytime sleepiness  -Has risk factors including a large neck circumference, elevated BMI, male gender.  Snoring and sleepiness support this.  In addition on exam he has a large tongue and an overbite which contribute  -As he noticed improvement with weight loss in the past, that to can suggest that ANN-MARIE is the cause  -He indicates that he is very opposed to use of CPAP.  Likely I would recommend this if ANN-MARIE is identified.  If a very severe case is strong, then I would strongly recommend this.  -He expressed interest in  Zepbound.  I am not prescribing this medication at present as the FDA approval for ANN-MARIE is so new, so he wishes to follow-up with Dr. Zhao.  That said, he did not tolerate Wegovy so it is possible he could have similar side effects on Zepbound.  -If ANN-MARIE is identified, the inspire device would likely also not be a good choice as see is a heavy weightlifter.  Orders:  •  Home Study; Future    Primary insomnia  -Likely this is from obstructive sleep apnea.  Has other issues such as pain which could contribute.  Orders:  •  Ambulatory Referral to Sleep Medicine    Excessive daytime sleepiness    Orders:  •  Home Study; Future    Snoring    Orders:  •  Home Study; Future    Long-term current use of testosterone replacement therapy  -He self administers this (not prescribed for him), we discussed it could potentially contribute to sleep apnea and other sleep issues.  I recommended that it would be best for him to stop this .    -He inquired if a lower dose would be better.  I surmise it would be dose-dependent and lower doses would be less likely to affect sleep but that said, it is not  "recommended to use this on his own and his recent testosterone levels have been high.           History of Present Illness   HPI          This is a 44-year-old male who presents in consultation for insomnia.    Chief complaint-trouble sleeping; \"I',m sleeping like crap\"    Tony was referred by Dr. Zhao for insomnia symptoms.  He has a medical history including stage IIIa chronic kidney disease, hypercholesterolemia, and an elevated testosterone level.    He is a  works from 630-330 pm    Tony lifts a lot of weight and got to 223 pounds, went on Wegovy and lost weight to 189 pounds.   Was sleeping better at a lower weight but had reflux and didn't like the prospect of possible muscle loss from the medication    Has slept poorly for a few years.      He goes to bed between 11 and 12.  Falls asleep without difficulty. He is up at 530 during the week.  On weekends he goes to bed between 11 and 1 and is up at 6 to 7 AM.  He has 2-3 awakenings during the night, feels like he tosses and turns when he awakens. Does not have long awakenings per se but tosses and turns and has poor quality sleep.     Sometimes feels like pain affects his sleep- low back and elbows.  Denies depression or anxiety at present.  Gets annoyed when he cannot sleep    He feels sleepy during the day, does not take naps intentionally.  [place] Sleepiness Scale score is elevated at 11.  He denies drowsy driving.  Falls asleep in the evening at home, knows it is going to happen.  Gets sleepy at 1230-1 pm as well but does not fall asleep     Sleeps alone.He has snoring, denies witnessed apneas, gasping in sleep. No AM headaches.  No dry mouth.  Has problems breathing thorough left nostril     He denies sleepwalking, restless legs, or dream enactment.    He is 16 ounces of coffee a day, does not drink alcohol during the week but has alcohol on the weekends.  Sometimes has caffeine in the day.  On TRT on and off for a few years.  Not sure " "if that correlates with poor sleep in his case    Exercises M-F 4-6 days a week from 4-5 PM. No supplements.       Does not have restless legs, has habitual foot tapping     Sitting and reading: Moderate chance of dozing  Watching TV: Slight chance of dozing  Sitting, inactive in a public place (e.g. a theatre or a meeting): Would never doze  As a passenger in a car for an hour without a break: Would never doze  Lying down to rest in the afternoon when circumstances permit: High chance of dozing  Sitting and talking to someone: Would never doze  Sitting quietly after a lunch without alcohol: Moderate chance of dozing  In a car, while stopped for a few minutes in traffic: Would never doze  Total score: 8     Review of Systems  Pertinent positives/negatives included in HPI and also as noted:       Objective   /84 (BP Location: Left arm, Patient Position: Sitting, Cuff Size: Large)   Ht 5' 6\" (1.676 m)   Wt 95.3 kg (210 lb)   BMI 33.89 kg/m²     Neck Circumference: 17.75  Physical Exam  Mallampati Grade : Mallampati 4      Tongue : Wide tongue, Macroglossia, Tongue midline, has full ROM, and Scalloping  Soft palate and uvula : Elongated soft palate                  Appearance : no distress, alert, cooperative  Mental Status. Memory, and Affect : alert and oriented, normal affect, makes good eye contact, provides a detailed history  Cardiac- : regular rate and rhythm, S1, S2 normal, no murmur, click, rub or gallop  Pulmonary : clear to auscultation bilaterally            Data  Lab Results   Component Value Date    HGB 17.2 (H) 01/13/2025    HCT 50.3 (H) 01/13/2025    MCV 94 01/13/2025      Lab Results   Component Value Date    CALCIUM 9.7 01/13/2025    K 4.5 01/13/2025    CO2 31 01/13/2025     01/13/2025    BUN 16 01/13/2025    CREATININE 1.34 (H) 01/13/2025     Lab Results   Component Value Date    FERRITIN 191 11/18/2022     Lab Results   Component Value Date    AST 26 01/13/2025    ALT 41 01/13/2025 "

## 2025-01-21 NOTE — PROGRESS NOTES
ORTHOPAEDIC HAND, WRIST, AND ELBOW OFFICE  VISIT     ASSESSMENT/PLAN:    Tony Corey is a 44 y.o. RHD male who presents with high-grade insertional partial triceps tear, and olecranon bursitis of right elbow    Patient reports improvements, I am pleased with his clinical exam. He can continue activities as tolerated at the gym.   Discussed that as long as he remains pain free and continues with full function of upper extremity surgical intervention is not warranted.     The patient verbalized understanding of exam findings and treatment plan. We engaged in the shared decision-making process and treatment options were discussed at length with the patient. Surgical and conservative management discussed today along with risks and benefits.    Follow Up:  If symptoms worsen or fail to improve  ____________________________________________________________________________________________________________________________________________      CHIEF COMPLAINT:  Right elbow pain    SUBJECTIVE:  Tony Corey is a 44 y.o. male who presents with posterior right elbow pain. This began nearly one year go. He initial sought treatment in August. An MRI of the right elbow was obtained in September, demonstrating a partial tear of the triceps insertion. Patient has tried steroid injections, OTC analgesics, activity modification, and physical therapy. He has been resuming his normal gym routine with out issue. He states his pain is controlled.     I have personally reviewed all the relevant PMH, PSH, SH, FH, Medications and allergies      PAST MEDICAL HISTORY:  Past Medical History:   Diagnosis Date    Epilepsy (HCC)     and recurrent seizures, one time age 12    Kidney stone        PAST SURGICAL HISTORY:  Past Surgical History:   Procedure Laterality Date    ANTERIOR CRUCIATE LIGAMENT REPAIR Right     FL RETROGRADE PYELOGRAM  5/15/2023    MO CYSTOURETHROSCOPY W/URETERAL CATHETERIZATION Left 5/15/2023    Procedure: CYSTOSCOPY  RETROGRADE PYELOGRAM WITH URETEROSCOPY, STONE EXTRACTION,  INSERTION STENT URETERAL LEFT;  Surgeon: Emanuel Capps MD;  Location: [facility];  Service: Urology       FAMILY HISTORY:  Family History   Problem Relation Age of Onset    Other Mother         liver transplant       SOCIAL HISTORY:  Social History     Tobacco Use    Smoking status: Never     Passive exposure: Never    Smokeless tobacco: Never   Vaping Use    Vaping status: Never Used   Substance Use Topics    Alcohol use: Yes     Comment: socially    Drug use: Never       MEDICATIONS:  No current outpatient medications on file.    ALLERGIES:  No Known Allergies        REVIEW OF SYSTEMS:  Pertinent items are noted in HPI.  A comprehensive review of systems was negative.    VITALS:  There were no vitals filed for this visit.    LABS:  HgA1c:   Lab Results   Component Value Date    HGBA1C 5.4 01/13/2025     BMP:   Lab Results   Component Value Date    CALCIUM 9.7 01/13/2025    K 4.5 01/13/2025    CO2 31 01/13/2025     01/13/2025    BUN 16 01/13/2025    CREATININE 1.34 (H) 01/13/2025       _____________________________________________________  PHYSICAL EXAMINATION:  General: well developed and well nourished, alert, oriented times 3, and appears comfortable  Psychiatric: Normal  HEENT: Normocephalic, Atraumatic Trachea Midline, No torticollis  Pulmonary: No audible wheezing or respiratory distress   Abdomen/GI: Non tender, non distended   Cardiovascular: No pitting edema, 2+ radial pulse   Skin: No masses, erythema, lacerations, fluctation, ulcerations  Neurovascular: Sensation Intact to the Median, Ulnar, Radial Nerve, Motor Intact to the Median, Ulnar, Radial Nerve, and Pulses Intact  Musculoskeletal: Normal, except as noted in detailed exam and in HPI.        FOCUSED MUSCULOSKELETAL EXAMINATION:  Right Upper Extremity  Inspection: skin intact, well circumscribed mass about right elbow olecranon  Palpation: non-tender well circumscribed mobile  mass about elbow olecranon bursa. Mild ttp about triceps insertion  Neurologic: 5/5 elbow flexion, 5/5 elbow extension, 5/5 wrist extension, 5/5 wrist flexion, 5/5 finger flexion, 5/5 finger extension, 5/5 FPL, 5/5 EPL, 5/5 APB, 5/5 intrinsics, sensation intact to median, radial, and ulnar nerve distributions  Vascular: Palpable radial pulse, brisk cap refill <2sec, hand warm and well perfused  MSK:   RIGHT SIDE:  Elbow:  ROM free and full, Tenderness triceps insertion, and Bursitis of olecranon  ___________________________________________________  STUDIES REVIEWED:  X-rays of the right elbow were obtained on 8/20/2024 were independently reviewed which demonstrates no acute osseous abnormalities or significant degenerative changes.    MRI of the right elbow was obtained on 9/3/2024 was independently reviewed which demonstrates tendinosis and partial-thickness tearing of the distal triceps insertion. Mild common flexor tendinosis.       LABS REVIEWED:    HgA1c:   Lab Results   Component Value Date    HGBA1C 5.4 01/13/2025     BMP:   Lab Results   Component Value Date    CALCIUM 9.7 01/13/2025    K 4.5 01/13/2025    CO2 31 01/13/2025     01/13/2025    BUN 16 01/13/2025    CREATININE 1.34 (H) 01/13/2025               PROCEDURES PERFORMED:  No Procedures performed today    _____________________________________________________      Scribe Attestation      I,:   am acting as a scribe while in the presence of the attending physician.:       I,:   personally performed the services described in this documentation    as scribed in my presence.:               I agree with the history, physical examination, assessment and plan of care as documented above.    Salvador Chan M.D.  Attending, Orthopaedic Surgery  Hand, Wrist, and Elbow Surgery  [facility]

## 2025-01-21 NOTE — ASSESSMENT & PLAN NOTE
-Likely this is from obstructive sleep apnea.  Has other issues such as pain which could contribute.  Orders:  •  Ambulatory Referral to Sleep Medicine

## 2025-02-20 ENCOUNTER — OFFICE VISIT (OUTPATIENT)
Age: 45
End: 2025-02-20
Payer: COMMERCIAL

## 2025-02-20 VITALS
BODY MASS INDEX: 33.43 KG/M2 | OXYGEN SATURATION: 99 % | HEIGHT: 66 IN | WEIGHT: 208 LBS | TEMPERATURE: 97.8 F | SYSTOLIC BLOOD PRESSURE: 124 MMHG | DIASTOLIC BLOOD PRESSURE: 80 MMHG | HEART RATE: 78 BPM

## 2025-02-20 DIAGNOSIS — N18.2 CKD (CHRONIC KIDNEY DISEASE) STAGE 2, GFR 60-89 ML/MIN: ICD-10-CM

## 2025-02-20 DIAGNOSIS — Z00.00 WELL ADULT EXAM: Primary | ICD-10-CM

## 2025-02-20 PROCEDURE — 99396 PREV VISIT EST AGE 40-64: CPT | Performed by: FAMILY MEDICINE

## 2025-02-20 NOTE — PROGRESS NOTES
"Assessment/Plan:       Diagnoses and all orders for this visit:    Well adult exam  Comments:  Patient up-to-date with vaccines and laboratory studies.  Patient up-to-date with colonoscopy.  With exercise and diet.  Orders:  -     VAS screening; Future  -     Echo complete w/ contrast if indicated; Future    CKD (chronic kidney disease) stage 2, GFR 60-89 ml/min  -     VAS screening; Future  -     Echo complete w/ contrast if indicated; Future            Subjective:        Patient ID: Tony Corey is a 45 y.o. male.      Patient is here for wellness exam.  Labs and vaccines reviewed.  Patient up-to-date with colonoscopy.  Patient wishes vascular screening          The following portions of the patient's history were reviewed and updated as appropriate: allergies, current medications, past family history, past medical history, past social history, past surgical history and problem list.      Review of Systems   Constitutional: Negative.    HENT: Negative.     Eyes: Negative.    Respiratory: Negative.     Cardiovascular: Negative.    Gastrointestinal: Negative.    Endocrine: Negative.    Genitourinary: Negative.    Musculoskeletal: Negative.    Skin: Negative.    Allergic/Immunologic: Negative.    Neurological: Negative.    Hematological: Negative.    Psychiatric/Behavioral: Negative.             Objective:        Depression Screening and Follow-up Plan: Patient was screened for depression during today's encounter. They screened negative with a PHQ-2 score of 0.              /80 (BP Location: Left arm, Patient Position: Sitting, Cuff Size: Large)   Pulse 78   Temp 97.8 °F (36.6 °C) (Temporal)   Ht 5' 6\" (1.676 m)   Wt 94.3 kg (208 lb)   SpO2 99%   BMI 33.57 kg/m²          Physical Exam  Vitals and nursing note reviewed.   Constitutional:       General: He is not in acute distress.     Appearance: Normal appearance. He is not ill-appearing, toxic-appearing or diaphoretic.   HENT:      Head: Normocephalic " and atraumatic.      Right Ear: Tympanic membrane, ear canal and external ear normal. There is no impacted cerumen.      Left Ear: Tympanic membrane, ear canal and external ear normal. There is no impacted cerumen.      Nose: Nose normal. No congestion or rhinorrhea.      Mouth/Throat:      Mouth: Mucous membranes are moist.      Pharynx: No oropharyngeal exudate or posterior oropharyngeal erythema.   Eyes:      General: No scleral icterus.        Right eye: No discharge.         Left eye: No discharge.   Neck:      Vascular: No carotid bruit.   Cardiovascular:      Rate and Rhythm: Normal rate and regular rhythm.      Pulses: Normal pulses.      Heart sounds: Normal heart sounds. No murmur heard.     No friction rub. No gallop.   Pulmonary:      Effort: Pulmonary effort is normal. No respiratory distress.      Breath sounds: Normal breath sounds. No stridor. No wheezing, rhonchi or rales.   Chest:      Chest wall: No tenderness.   Musculoskeletal:         General: No swelling, tenderness, deformity or signs of injury. Normal range of motion.      Cervical back: Normal range of motion and neck supple. No rigidity. No muscular tenderness.      Right lower leg: No edema.      Left lower leg: No edema.   Lymphadenopathy:      Cervical: No cervical adenopathy.   Skin:     General: Skin is warm and dry.      Capillary Refill: Capillary refill takes less than 2 seconds.      Coloration: Skin is not jaundiced.      Findings: No bruising, erythema, lesion or rash.   Neurological:      Mental Status: He is alert and oriented to person, place, and time. Mental status is at baseline.      Cranial Nerves: No cranial nerve deficit.      Sensory: No sensory deficit.      Motor: No weakness.      Coordination: Coordination normal.      Gait: Gait normal.   Psychiatric:         Mood and Affect: Mood normal.         Behavior: Behavior normal.         Thought Content: Thought content normal.         Judgment: Judgment normal.

## 2025-03-05 ENCOUNTER — HOSPITAL ENCOUNTER (OUTPATIENT)
Dept: SLEEP CENTER | Facility: CLINIC | Age: 45
Discharge: HOME | End: 2025-03-05
Payer: COMMERCIAL

## 2025-03-05 DIAGNOSIS — R29.818 SUSPECTED SLEEP APNEA: ICD-10-CM

## 2025-03-05 DIAGNOSIS — R06.83 SNORING: ICD-10-CM

## 2025-03-05 DIAGNOSIS — G47.19 EXCESSIVE DAYTIME SLEEPINESS: ICD-10-CM

## 2025-03-05 PROCEDURE — G0399 HOME SLEEP TEST/TYPE 3 PORTA: HCPCS

## 2025-03-05 PROCEDURE — G0399 HOME SLEEP TEST/TYPE 3 PORTA: HCPCS | Performed by: PSYCHIATRY & NEUROLOGY

## 2025-03-05 NOTE — PROGRESS NOTES
Home Sleep Study Documentation    HOME STUDY DEVICE: Noxturnal no                                           Kalina G3 yes device # 3      Pre-Sleep Home Study:    Set-up and instructions performed by: Lawanda    Technician performed demonstration for Patient: yes    Return demonstration performed by Patient: yes    Written instructions provided to Patient: yes    Patient signed consent form: yes        Post-Sleep Home Study:    Additional comments by Patient: pending    Home Sleep Study Failed:pending    Failure reason: pending    Reported or Detected: pending    Scored by: pending

## 2025-03-12 PROBLEM — G47.33 OSA (OBSTRUCTIVE SLEEP APNEA): Status: ACTIVE | Noted: 2025-03-12

## 2025-03-16 ENCOUNTER — RESULTS FOLLOW-UP (OUTPATIENT)
Dept: SLEEP CENTER | Facility: CLINIC | Age: 45
End: 2025-03-16

## 2025-03-20 ENCOUNTER — OFFICE VISIT (OUTPATIENT)
Dept: SLEEP CENTER | Facility: CLINIC | Age: 45
End: 2025-03-20
Payer: COMMERCIAL

## 2025-03-20 VITALS
DIASTOLIC BLOOD PRESSURE: 83 MMHG | BODY MASS INDEX: 33.72 KG/M2 | WEIGHT: 209.8 LBS | HEIGHT: 66 IN | SYSTOLIC BLOOD PRESSURE: 120 MMHG | OXYGEN SATURATION: 97 % | HEART RATE: 68 BPM

## 2025-03-20 DIAGNOSIS — G47.33 OSA (OBSTRUCTIVE SLEEP APNEA): Primary | ICD-10-CM

## 2025-03-20 DIAGNOSIS — D75.1 POLYCYTHEMIA: ICD-10-CM

## 2025-03-20 DIAGNOSIS — J34.2 DEVIATED NASAL SEPTUM: ICD-10-CM

## 2025-03-20 PROCEDURE — 99214 OFFICE O/P EST MOD 30 MIN: CPT | Performed by: PSYCHIATRY & NEUROLOGY

## 2025-03-20 NOTE — ASSESSMENT & PLAN NOTE
- Patient with symptoms of snoring, unrefreshed sleep and daytime sleepiness.  - Home sleep study from 3-5-2025 noted for moderate obstructive sleep apnea with CASEY of 19.2, 53.1-minute of desaturations less than 89% with lowest SpO2 of 75.  - Dr. Edwards showed raw data to the patient, I reviewed test details as well.    -He initially expressed trepidation about starting CPAP but after discussion is willing to try this at home.  We discussed it should help improve sleep quality which was his major concern which led to his presentation   -Will initiate therapy with PAP 5 to 15 cm H2O, nasal mask  - Explained the importance of keeping the machine clean, and that they should be eligible for refills of supplies every 3-6 months depending on insurance.  We also discussed the insurance compliance requirements.  - Encouraged healthy lifestyle with adequate sleep (7-9 hours per night), healthy balanced diet and routine exercise.  Explained the importance of avoiding driving while drowsy.  - Follow-up 31-90 days for a compliance data review.   Orders:  •  Ambulatory Referral to Weight Management; Future  •  CPAP Auto New DME

## 2025-03-20 NOTE — PROGRESS NOTES
Name: Tony Corey      : 1980      MRN: [id number]  Encounter Provider: John Jonas MD  Encounter Date: 3/20/2025   Encounter department: [facility] SLEEP MEDICINE DON  :  Assessment & Plan  ANN-MARIE (obstructive sleep apnea)  - Patient with symptoms of snoring, unrefreshed sleep and daytime sleepiness.  - Home sleep study from 3-5-2025 noted for moderate obstructive sleep apnea with CASEY of 19.2, 53.1-minute of desaturations less than 89% with lowest SpO2 of 75.  - Dr. Edwards showed raw data to the patient, I reviewed test details as well.    -He initially expressed trepidation about starting CPAP but after discussion is willing to try this at home.  We discussed it should help improve sleep quality which was his major concern which led to his presentation   -Will initiate therapy with PAP 5 to 15 cm H2O, nasal mask  - Explained the importance of keeping the machine clean, and that they should be eligible for refills of supplies every 3-6 months depending on insurance.  We also discussed the insurance compliance requirements.  - Encouraged healthy lifestyle with adequate sleep (7-9 hours per night), healthy balanced diet and routine exercise.  Explained the importance of avoiding driving while drowsy.  - Follow-up 31-90 days for a compliance data review.   Orders:  •  Ambulatory Referral to Weight Management; Future  •  CPAP Auto New DME    Deviated nasal septum  He notes impaired nasal breathing at times.  Will keep this in mind as it could affect CPAP adherence.  He does not want to have ENT assessment as he would not want to pursue surgery  -Discussed taking his fluticasone which she has been.  He will contact me if he wants a prescription for this  -If he struggles with nasal breathing, may need a fullface mask       Polycythemia  -Has a high hemoglobin and hematocrit, untreated obstructive sleep apnea and hypoxia could contribute although other causes cannot be excluded.  Should follow-up  with his primary care physician regarding this.  This is not a new finding, has been seen several years ago.  There is a scenario where treating obstructive sleep apnea result in improvement in this           History of Present Illness   HPI           Patient seen with Mateo Edwards M.D. (sleep medicine fellow, he helped write this note which I revised before signing)     CC: I am here to go over the sleep study results.     Last seen by 1/21/2025: He has a medical history including stage IIIa chronic kidney disease, hypercholesterolemia, and an testosterone replacement.      He is a  works from 630am-330 pm. M-F.       He goes to bed between 11 and 12.  Falls asleep without difficulty. He is up at 5:15 am - 5:30 during the week.  On weekends he goes to bed between 11pm and 1am and is up at 6 to 7 AM.  He has a couple awakenings during the night, feels like he tosses and turns when he awakens. Will get back to sleep easily. He does not feel refreshed.      Has some daytime sleepiness, he notes he fasts -> if he eats during the day he will get sleepy after lunch.  [place] Sleepiness Scale score is  9.  He denies drowsy driving. Denies any near misses. No affect on concentration and mood.      Sleeps alone.He has snoring, denies witnessed apneas, gasping in sleep. No AM headaches.  No dry mouth.  Has problems breathing thorough left nostril ( suspets narrow septum)      He denies sleepwalking, restless legs, or dream enactment.     He is 16 ounces twice a of coffee a day, Will drink his second drink at 2pm. does not drink alcohol during the week but has alcohol on the weekends ( couple dueing the week, few drinks in the weekends).  On TRT on and off for a few years.      Exercises M-F 4-6 days a week from 4-5 PM. No supplements.         Sitting and reading: High chance of dozing  Watching TV: High chance of dozing  Sitting, inactive in a public place (e.g. a theatre or a meeting): Would never doze  As  "a passenger in a car for an hour without a break: Would never doze  Lying down to rest in the afternoon when circumstances permit: High chance of dozing  Sitting and talking to someone: Would never doze  Sitting quietly after a lunch without alcohol: Would never doze  In a car, while stopped for a few minutes in traffic: Would never doze  Total score: 9     Review of Systems  Pertinent positives/negatives included in HPI and also as noted:       Objective   /83 (BP Location: Right arm, Patient Position: Sitting, Cuff Size: Standard)   Pulse 68   Ht 5' 6\" (1.676 m)   Wt 95.2 kg (209 lb 12.8 oz)   SpO2 97%   BMI 33.86 kg/m²        Physical Exam (performed by Dr. Edwards, not confirmed by me today   Mallampati Grade : Mallampati 4  Oropharynx : crowded  Tongue : Large tongue and Scalloping                                   Data  Lab Results   Component Value Date    HGB 17.2 (H) 01/13/2025    HCT 50.3 (H) 01/13/2025    MCV 94 01/13/2025      Lab Results   Component Value Date    CALCIUM 9.7 01/13/2025    K 4.5 01/13/2025    CO2 31 01/13/2025     01/13/2025    BUN 16 01/13/2025    CREATININE 1.34 (H) 01/13/2025     Lab Results   Component Value Date    FERRITIN 191 11/18/2022     Lab Results   Component Value Date    AST 26 01/13/2025    ALT 41 01/13/2025         "

## 2025-03-21 ENCOUNTER — TELEPHONE (OUTPATIENT)
Age: 45
End: 2025-03-21

## 2025-03-21 ENCOUNTER — TELEPHONE (OUTPATIENT)
Dept: SLEEP CENTER | Facility: CLINIC | Age: 45
End: 2025-03-21

## 2025-03-21 NOTE — LETTER
March 21, 2025     Patient: Tony Corey  YOB: 1980  Date of Visit: 3/5/2025      To Whom it May Concern:    Tony Corey is under the professional care of Dr. John Jonas.  Tony completed a sleep study on 3/5/2025 - 3/6/2025.     If you have any questions or concerns, please don't hesitate to call.         Sincerely,     Elvis Rinaldi RN   [facility]        CC: No Recipients

## 2025-03-21 NOTE — TELEPHONE ENCOUNTER
Patient calling to get note from 3/5/2025 sleep study for work to get sick time.  Please assist.  Patient will get in MyChart if put in.

## 2025-03-22 PROBLEM — Z00.00 WELL ADULT EXAM: Status: RESOLVED | Noted: 2025-02-20 | Resolved: 2025-03-22

## 2025-03-24 LAB

## 2025-03-26 LAB

## 2025-04-01 ENCOUNTER — HOSPITAL ENCOUNTER (OUTPATIENT)
Dept: NON INVASIVE DIAGNOSTICS | Facility: HOSPITAL | Age: 45
Discharge: HOME | End: 2025-04-01
Attending: FAMILY MEDICINE
Payer: COMMERCIAL

## 2025-04-01 ENCOUNTER — RESULTS FOLLOW-UP (OUTPATIENT)
Age: 45
End: 2025-04-01

## 2025-04-01 VITALS
BODY MASS INDEX: 33.59 KG/M2 | HEIGHT: 66 IN | WEIGHT: 209 LBS | DIASTOLIC BLOOD PRESSURE: 83 MMHG | HEART RATE: 68 BPM | SYSTOLIC BLOOD PRESSURE: 120 MMHG

## 2025-04-01 DIAGNOSIS — Z00.00 WELL ADULT EXAM: ICD-10-CM

## 2025-04-01 DIAGNOSIS — N18.2 CKD (CHRONIC KIDNEY DISEASE) STAGE 2, GFR 60-89 ML/MIN: ICD-10-CM

## 2025-04-01 LAB
AORTIC ROOT: 3 CM
ASCENDING AORTA: 3.1 CM
BSA FOR ECHO PROCEDURE: 2.04 M2
DME PARACHUTE DELIVERY DATE EXPECTED: NORMAL
DME PARACHUTE DELIVERY DATE REQUESTED: NORMAL
DME PARACHUTE ITEM DESCRIPTION: NORMAL
DME PARACHUTE ORDER STATUS: NORMAL
DME PARACHUTE SUPPLIER NAME: NORMAL
DME PARACHUTE SUPPLIER PHONE: NORMAL
E WAVE DECELERATION TIME: 200 MS
E/A RATIO: 0.84
FRACTIONAL SHORTENING: 37 (ref 28–44)
INTERVENTRICULAR SEPTUM IN DIASTOLE (PARASTERNAL SHORT AXIS VIEW): 1 CM
INTERVENTRICULAR SEPTUM: 1 CM (ref 0.6–1.1)
LAAS-AP2: 14.4 CM2
LAAS-AP4: 16.1 CM2
LEFT ATRIUM SIZE: 3.5 CM
LEFT ATRIUM VOLUME (MOD BIPLANE): 37 ML
LEFT ATRIUM VOLUME INDEX (MOD BIPLANE): 18.1 ML/M2
LEFT INTERNAL DIMENSION IN SYSTOLE: 3.2 CM (ref 2.1–4)
LEFT VENTRICULAR INTERNAL DIMENSION IN DIASTOLE: 5.1 CM (ref 3.5–6)
LEFT VENTRICULAR POSTERIOR WALL IN END DIASTOLE: 0.9 CM
LEFT VENTRICULAR STROKE VOLUME: 85 ML
LV EF US.2D.A4C+ESTIMATED: 58 %
LVSV (TEICH): 85 ML
MV E'TISSUE VEL-LAT: 11 CM/S
MV E'TISSUE VEL-SEP: 9 CM/S
MV PEAK A VEL: 0.74 M/S
MV PEAK E VEL: 62 CM/S
MV STENOSIS PRESSURE HALF TIME: 58 MS
MV VALVE AREA P 1/2 METHOD: 3.79
RA PRESSURE ESTIMATED: 5 MMHG
RIGHT ATRIUM AREA SYSTOLE A4C: 15.7 CM2
RIGHT VENTRICLE ID DIMENSION: 4 CM
SL CV LEFT ATRIUM LENGTH A2C: 4.8 CM
SL CV LV EF: 45
SL CV PED ECHO LEFT VENTRICLE DIASTOLIC VOLUME (MOD BIPLANE) 2D: 126 ML
SL CV PED ECHO LEFT VENTRICLE SYSTOLIC VOLUME (MOD BIPLANE) 2D: 41 ML
TRICUSPID ANNULAR PLANE SYSTOLIC EXCURSION: 2.5 CM

## 2025-04-01 PROCEDURE — 93978 VASCULAR STUDY: CPT | Performed by: SURGERY

## 2025-04-01 PROCEDURE — 93880 EXTRACRANIAL BILAT STUDY: CPT | Performed by: SURGERY

## 2025-04-01 PROCEDURE — 93306 TTE W/DOPPLER COMPLETE: CPT

## 2025-04-01 PROCEDURE — 93922 UPR/L XTREMITY ART 2 LEVELS: CPT | Performed by: SURGERY

## 2025-04-01 PROCEDURE — 93306 TTE W/DOPPLER COMPLETE: CPT | Performed by: INTERNAL MEDICINE

## 2025-04-01 PROCEDURE — 93922 UPR/L XTREMITY ART 2 LEVELS: CPT

## 2025-04-03 ENCOUNTER — OFFICE VISIT (OUTPATIENT)
Age: 45
End: 2025-04-03
Payer: COMMERCIAL

## 2025-04-03 VITALS
HEIGHT: 66 IN | BODY MASS INDEX: 33.59 KG/M2 | WEIGHT: 209 LBS | DIASTOLIC BLOOD PRESSURE: 84 MMHG | HEART RATE: 87 BPM | SYSTOLIC BLOOD PRESSURE: 130 MMHG | TEMPERATURE: 98 F | OXYGEN SATURATION: 97 %

## 2025-04-03 DIAGNOSIS — I72.3 ILIAC ARTERY ANEURYSM (HCC): Primary | ICD-10-CM

## 2025-04-03 DIAGNOSIS — I50.22 CHRONIC HEART FAILURE WITH MILDLY REDUCED EJECTION FRACTION (HFMREF, 41-49%) (HCC): ICD-10-CM

## 2025-04-03 DIAGNOSIS — G47.33 OSA (OBSTRUCTIVE SLEEP APNEA): ICD-10-CM

## 2025-04-03 DIAGNOSIS — E66.811 CLASS 1 OBESITY WITH SERIOUS COMORBIDITY AND BODY MASS INDEX (BMI) OF 33.0 TO 33.9 IN ADULT, UNSPECIFIED OBESITY TYPE: ICD-10-CM

## 2025-04-03 DIAGNOSIS — E66.09 CLASS 1 OBESITY DUE TO EXCESS CALORIES WITH SERIOUS COMORBIDITY AND BODY MASS INDEX (BMI) OF 34.0 TO 34.9 IN ADULT: ICD-10-CM

## 2025-04-03 DIAGNOSIS — E66.811 CLASS 1 OBESITY DUE TO EXCESS CALORIES WITH SERIOUS COMORBIDITY AND BODY MASS INDEX (BMI) OF 34.0 TO 34.9 IN ADULT: ICD-10-CM

## 2025-04-03 PROCEDURE — 99214 OFFICE O/P EST MOD 30 MIN: CPT | Performed by: FAMILY MEDICINE

## 2025-04-03 RX ORDER — TIRZEPATIDE 2.5 MG/.5ML
2.5 INJECTION, SOLUTION SUBCUTANEOUS WEEKLY
Qty: 2 ML | Refills: 0 | Status: SHIPPED | OUTPATIENT
Start: 2025-04-03 | End: 2025-05-01

## 2025-04-03 NOTE — PROGRESS NOTES
Name: Tony Corey      : 1980      MRN: [id number]  Encounter Provider: Daniel Zhao DO  Encounter Date: 4/3/2025   Encounter department: [facility]  :  Assessment & Plan  Iliac artery aneurysm (HCC)  Patient with iliac artery ectasia.  Referral to vascular for opinion.  VS screening discussed with patient.    Orders:    Ambulatory Referral to Vascular Surgery; Future    Chronic heart failure with mildly reduced ejection fraction (HFmrEF, 41-49%) (HCC)  Wt Readings from Last 3 Encounters:   25 94.8 kg (209 lb)   25 94.8 kg (209 lb)   25 95.2 kg (209 lb 12.8 oz)       Referral to cardiology for evaluation.  Echo discussed with patient.        Orders:    Ambulatory Referral to Cardiology; Future    ANN-MARIE (obstructive sleep apnea)  Sleep study discussed with the patient.  Orders:    tirzepatide (Zepbound) 2.5 mg/0.5 mL auto-injector; Inject 0.5 mL (2.5 mg total) under the skin once a week for 28 days    Class 1 obesity due to excess calories with serious comorbidity and body mass index (BMI) of 34.0 to 34.9 in adult           Class 1 obesity with serious comorbidity and body mass index (BMI) of 33.0 to 33.9 in adult, unspecified obesity type      Orders:    tirzepatide (Zepbound) 2.5 mg/0.5 mL auto-injector; Inject 0.5 mL (2.5 mg total) under the skin once a week for 28 days           History of Present Illness   Patient is here to follow-up on cardiovascular studies.  No chest pain or shortness of breath or palpitations or difficulty with urination or defecation.  Patient in normal state of health overall.  Patient did have sleep study done showing moderate to severe sleep apnea.  Patient was recommended to start tirzepatide for weight loss/sleep apnea.      Review of Systems   Constitutional:  Positive for fatigue.   HENT: Negative.     Eyes: Negative.    Respiratory: Negative.     Cardiovascular: Negative.    Gastrointestinal: Negative.    Endocrine: Negative.   "  Genitourinary: Negative.    Musculoskeletal: Negative.    Skin: Negative.    Allergic/Immunologic: Negative.    Neurological: Negative.    Hematological: Negative.    Psychiatric/Behavioral: Negative.         Objective   /84 (BP Location: Left arm, Patient Position: Sitting, Cuff Size: Large)   Pulse 87   Temp 98 °F (36.7 °C) (Temporal)   Ht 5' 6\" (1.676 m)   Wt 94.8 kg (209 lb)   SpO2 97%   BMI 33.73 kg/m²      Physical Exam  Vitals and nursing note reviewed.   Constitutional:       General: He is not in acute distress.     Appearance: He is well-developed. He is not diaphoretic.   HENT:      Head: Normocephalic and atraumatic.      Right Ear: External ear normal.      Left Ear: External ear normal.      Nose: Nose normal.   Eyes:      General:         Right eye: No discharge.         Left eye: No discharge.   Neck:      Thyroid: No thyromegaly.      Vascular: No carotid bruit.      Trachea: No tracheal deviation.   Cardiovascular:      Rate and Rhythm: Normal rate and regular rhythm.      Pulses: Normal pulses.      Heart sounds: Normal heart sounds. No murmur heard.     No gallop.   Pulmonary:      Effort: Pulmonary effort is normal. No respiratory distress.      Breath sounds: Normal breath sounds. No stridor. No wheezing or rales.   Chest:      Chest wall: No tenderness.   Musculoskeletal:         General: No tenderness or deformity. Normal range of motion.      Cervical back: Normal range of motion and neck supple.   Lymphadenopathy:      Cervical: No cervical adenopathy.   Skin:     General: Skin is warm and dry.      Capillary Refill: Capillary refill takes less than 2 seconds.      Coloration: Skin is not pale.      Findings: No erythema or rash.   Neurological:      Mental Status: He is alert and oriented to person, place, and time.      Cranial Nerves: No cranial nerve deficit.      Motor: No abnormal muscle tone.      Coordination: Coordination normal.      Deep Tendon Reflexes: Reflexes " normal.   Psychiatric:         Mood and Affect: Mood normal.         Behavior: Behavior normal.         Thought Content: Thought content normal.         Judgment: Judgment normal.

## 2025-04-03 NOTE — ASSESSMENT & PLAN NOTE
Sleep study discussed with the patient.  Orders:    tirzepatide (Zepbound) 2.5 mg/0.5 mL auto-injector; Inject 0.5 mL (2.5 mg total) under the skin once a week for 28 days

## 2025-04-03 NOTE — ASSESSMENT & PLAN NOTE
Orders:    tirzepatide (Zepbound) 2.5 mg/0.5 mL auto-injector; Inject 0.5 mL (2.5 mg total) under the skin once a week for 28 days

## 2025-04-03 NOTE — ASSESSMENT & PLAN NOTE
Patient with iliac artery ectasia.  Referral to vascular for opinion.  VS screening discussed with patient.    Orders:    Ambulatory Referral to Vascular Surgery; Future

## 2025-04-03 NOTE — ASSESSMENT & PLAN NOTE
Wt Readings from Last 3 Encounters:   04/03/25 94.8 kg (209 lb)   04/01/25 94.8 kg (209 lb)   03/20/25 95.2 kg (209 lb 12.8 oz)       Referral to cardiology for evaluation.  Echo discussed with patient.        Orders:    Ambulatory Referral to Cardiology; Future

## 2025-04-04 ENCOUNTER — TELEPHONE (OUTPATIENT)
Age: 45
End: 2025-04-04

## 2025-04-04 ENCOUNTER — OFFICE VISIT (OUTPATIENT)
Dept: VASCULAR SURGERY | Facility: CLINIC | Age: 45
End: 2025-04-04
Payer: COMMERCIAL

## 2025-04-04 VITALS
HEIGHT: 66 IN | HEART RATE: 81 BPM | WEIGHT: 212 LBS | SYSTOLIC BLOOD PRESSURE: 132 MMHG | BODY MASS INDEX: 34.07 KG/M2 | DIASTOLIC BLOOD PRESSURE: 82 MMHG

## 2025-04-04 DIAGNOSIS — R09.89 PROMINENT POPLITEAL PULSE: ICD-10-CM

## 2025-04-04 DIAGNOSIS — I72.3 ILIAC ARTERY ANEURYSM (HCC): Primary | ICD-10-CM

## 2025-04-04 PROCEDURE — 99243 OFF/OP CNSLTJ NEW/EST LOW 30: CPT | Performed by: NURSE PRACTITIONER

## 2025-04-04 RX ORDER — PHENTERMINE HYDROCHLORIDE 15 MG/1
CAPSULE ORAL
Refills: 0 | OUTPATIENT
Start: 2025-04-04

## 2025-04-04 NOTE — ASSESSMENT & PLAN NOTE
45-year-old male with obesity, HFmrEF, CKD, ANN-MARIE, cervical stenosis, right iliac artery ectasia     Referred to vascular for right iliac ectasia 1.5 cm on vascular screening    -Vascular screening showed normal carotid arteries,, bilateral [place] 1.2, abdominal aorta 2.8 cm and right common iliac artery measures 1.5 cm  -Non smoker, no known family hx of aneurysms  -Discussed the need for surveillance. Does not meet size criteria for repair  -Cautioned with heavy lifting   -Any acute, severe low back pain be evaluated in the ED  -Recommended repeat duplex in 1 year  -Prominent right popliteal artery pulse. Will evaluate with duplex to measure popliteal artery size  -Follow-up in the office in 1 year    Orders:    VAS abdominal aorta/iliac duplex; Future    Ambulatory Referral to Vascular Surgery

## 2025-04-04 NOTE — TELEPHONE ENCOUNTER
PA for (Zepbound) 2.5 mg/0.5 mL  APPROVED     Date(s) approved 04/04/2025-10/04/2025    Case #    Patient advised by          []MyChart Message  [x]Phone call   []LMOM  []L/M to call office as no active Communication consent on file  []Unable to leave detailed message as VM not approved on Communication consent       Pharmacy advised by    []Fax  [x]Phone call  LEFT DETAIL VM FOR PHARMACY. PHARMACY WAS CLOSED FOR LUNCH  []Secure Chat    Specialty Pharmacy         Approval letter scanned into Media Yes

## 2025-04-04 NOTE — ASSESSMENT & PLAN NOTE
-Prominent right popliteal artery pulse  -Will evaluate with duplex to measure popliteal artery size  -I will call him with result    Orders:    VAS ARTERIAL DUPLEX- LOWER LIMB BILATERAL; Future

## 2025-04-04 NOTE — PROGRESS NOTES
Name: Tony Corey      : 1980      MRN: [id number]  Encounter Provider: TRISTEN Christine  Encounter Date: 2025   Enc[facility]: [facility] [place]  :  Assessment & Plan  Iliac artery aneurysm (HCC)  45-year-old male with obesity, HFmrEF, CKD, ANN-MARIE, cervical stenosis, right iliac artery ectasia     Referred to vascular for right iliac ectasia 1.5 cm on vascular screening    -Vascular screening showed normal carotid arteries,, bilateral WE[place] 1.2, abdominal aorta 2.8 cm and right common iliac artery measures 1.5 cm  -Non smoker, no known family hx of aneurysms  -Discussed the need for surveillance. Does not meet size criteria for repair  -Cautioned with heavy lifting   -Any acute, severe low back pain be evaluated in the ED  -Recommended repeat duplex in 1 year  -Prominent right popliteal artery pulse. Will evaluate with duplex to measure popliteal artery size  -Follow-up in the office in 1 year    Orders:    VAS abdominal aorta/iliac duplex; Future    Ambulatory Referral to Vascular Surgery    Prominent popliteal pulse  -Prominent right popliteal artery pulse  -Will evaluate with duplex to measure popliteal artery size  -I will call him with result    Orders:    VAS ARTERIAL DUPLEX- LOWER LIMB BILATERAL; Future      New patient, presents to review VAS screening. Denies claudication, rest pain, or tissue loss.     History of Present Illness   Tony Corey is a 45 y.o. male who presents to the office for vascular evaluation.  Patient had vascular screening done which noted right iliac artery ectasia 1.5 cm.  He has no family history of aneurysmal disease that he is aware of, he is a non-smoker.  He denies any lower extremity claudication symptoms.  He has intermittent low back pain, musculoskeletal in nature.  He also notes left hip/lateral thigh discomfort at times.  He exercises regularly with weightlifting and cardio.  Blood pressure well-controlled.    History obtained from:  "patient    Review of Systems   Constitutional: Negative.    HENT: Negative.     Eyes: Negative.    Respiratory: Negative.     Cardiovascular: Negative.    Gastrointestinal: Negative.    Endocrine: Negative.    Genitourinary: Negative.    Musculoskeletal: Negative.    Skin: Negative.    Allergic/Immunologic: Negative.    Neurological: Negative.    Hematological: Negative.    Psychiatric/Behavioral: Negative.       Medical History Reviewed by provider this encounter:     .     Objective   I have reviewed and made appropriate changes to the review of systems input by the medical assistant.    Vitals:    04/04/25 1056   BP: 132/82   BP Location: Left arm   Patient Position: Sitting   Cuff Size: Standard   Pulse: 81   Weight: 96.2 kg (212 lb)   Height: 5' 6\" (1.676 m)       Patient Active Problem List   Diagnosis    Cervical radiculopathy    Elbow pain, chronic, left    Impingement syndrome of left shoulder    Left lateral epicondylitis    Myofascial pain    Right lateral epicondylitis    Cervical spinal stenosis    Cervical herniated disc    Hair loss    Skin neoplasm    Strain of calf muscle    Chronic bilateral low back pain without sciatica    Right knee pain    Right calf pain    Primary osteoarthritis of right knee    Ruptured Bakers cyst    Hypopigmentation    Muscle weakness of right upper extremity    Hyperpigmentation    Low TSH level    Pure hypercholesterolemia    Hyperglycemia    Primary insomnia    TMJ (dislocation of temporomandibular joint)    Tongue lesion    Epididymitis    Right testicular pain    Right hip pain    COVID-19    Chronic right shoulder pain    Primary osteoarthritis of left shoulder    Impingement syndrome of right shoulder    Chronic fatigue    Chronic pain of right knee    Mass of right upper extremity    Hydronephrosis    Nephrolithiasis    Seborrheic keratosis    Discrete lymph node    CKD (chronic kidney disease) stage 2, GFR 60-89 ml/min    Olecranon bursitis    Obesity    Elevated " testosterone level    Right elbow pain    Persistent proteinuria    Chronic kidney disease-mineral and bone disorder (CKD-MBD)    ANN-MARIE (obstructive sleep apnea)    Iliac artery aneurysm (HCC)    Chronic heart failure with mildly reduced ejection fraction (HFmrEF, 41-49%) (HCC)    Prominent popliteal pulse       Past Surgical History:   Procedure Laterality Date    ANTERIOR CRUCIATE LIGAMENT REPAIR Right     FL RETROGRADE PYELOGRAM  5/15/2023    WI CYSTOURETHROSCOPY W/URETERAL CATHETERIZATION Left 5/15/2023    Procedure: CYSTOSCOPY RETROGRADE PYELOGRAM WITH URETEROSCOPY, STONE EXTRACTION,  INSERTION STENT URETERAL LEFT;  Surgeon: Emanuel Capps MD;  Location: [facility];  Service: Urology       Family History   Problem Relation Age of Onset    Other Mother         liver transplant       Social History     Socioeconomic History    Marital status: /Civil Union     Spouse name: Not on file    Number of children: Not on file    Years of education: Not on file    Highest education level: Not on file   Occupational History     Employer: [employer]   Tobacco Use    Smoking status: Never     Passive exposure: Never    Smokeless tobacco: Never   Vaping Use    Vaping status: Never Used   Substance and Sexual Activity    Alcohol use: Yes     Comment: socially    Drug use: Never    Sexual activity: Yes     Partners: Female   Other Topics Concern    Not on file   Social History Narrative    Not on file     Social Drivers of Health     Financial Resource Strain: Low Risk  (2/19/2021)    Overall Financial Resource Strain (CARDIA)     Difficulty of Paying Living Expenses: Not hard at all   Food Insecurity: No Food Insecurity (2/19/2021)    Hunger Vital Sign     Worried About Running Out of Food in the Last Year: Never true     Ran Out of Food in the Last Year: Never true   Transportation Needs: No Transportation Needs (2/19/2021)    PRAPARE - Transportation     Lack of Transportation (Medical): No     Lack of  "Transportation (Non-Medical): No   Physical Activity: Sufficiently Active (11/2/2020)    Exercise Vital Sign     Days of Exercise per Week: 7 days     Minutes of Exercise per Session: 60 min   Stress: No Stress Concern Present (11/2/2020)    [place] [employer] - Occupational Stress Questionnaire     Feeling of Stress : Not at all   Social Connections: Socially Integrated (11/2/2020)    Social Connection and Isolation Panel [NHANES]     Frequency of Communication with Friends and Family: More than three times a week     Frequency of Social Gatherings with Friends and Family: More than three times a week     Attends Jainism Services: 1 to 4 times per year     Active Member of Clubs or Organizations: Yes     Attends Club or Organization Meetings: 1 to 4 times per year     Marital Status:    Intimate Partner Violence: Not At Risk (11/2/2020)    Humiliation, Afraid, Rape, and Kick questionnaire     Fear of Current or Ex-Partner: No     Emotionally Abused: No     Physically Abused: No     Sexually Abused: No   Housing Stability: Not on file       No Known Allergies      Current Outpatient Medications:     tirzepatide (Zepbound) 2.5 mg/0.5 mL auto-injector, Inject 0.5 mL (2.5 mg total) under the skin once a week for 28 days, Disp: 2 mL, Rfl: 0    tirzepatide (Zepbound) 2.5 mg/0.5 mL auto-injector, Inject 0.5 mL (2.5 mg total) under the skin once a week for 28 days, Disp: 2 mL, Rfl: 0    /82 (BP Location: Left arm, Patient Position: Sitting, Cuff Size: Standard)   Pulse 81   Ht 5' 6\" (1.676 m)   Wt 96.2 kg (212 lb)   BMI 34.22 kg/m²      Physical Exam  Vitals and nursing note reviewed.   Constitutional:       General: He is not in acute distress.     Appearance: He is well-developed.   HENT:      Head: Normocephalic and atraumatic.   Eyes:      Conjunctiva/sclera: Conjunctivae normal.   Cardiovascular:      Rate and Rhythm: Normal rate and regular rhythm.      Pulses:           " Radial pulses are 2+ on the right side and 2+ on the left side.        Femoral pulses are 2+ on the right side and 2+ on the left side.       Popliteal pulses are 3+ on the right side and 2+ on the left side.        Dorsalis pedis pulses are 2+ on the right side and 2+ on the left side.        Posterior tibial pulses are 2+ on the right side and 2+ on the left side.      Heart sounds: No murmur heard.  Pulmonary:      Effort: Pulmonary effort is normal. No respiratory distress.      Breath sounds: Normal breath sounds.   Abdominal:      Palpations: Abdomen is soft.      Tenderness: There is no abdominal tenderness.   Musculoskeletal:         General: No swelling.      Cervical back: Neck supple.   Skin:     General: Skin is warm and dry.      Capillary Refill: Capillary refill takes less than 2 seconds.   Neurological:      General: No focal deficit present.      Mental Status: He is alert and oriented to person, place, and time.   Psychiatric:         Mood and Affect: Mood normal.         Administrative Statements   I have spent a total time of 35 minutes in caring for this patient on the day of the visit/encounter including Diagnostic results, Prognosis, Risks and benefits of tx options, Instructions for management, Patient and family education, Importance of tx compliance, Risk factor reductions, Impressions, Counseling / Coordination of care, Documenting in the medical record, and Obtaining or reviewing history  .

## 2025-04-04 NOTE — TELEPHONE ENCOUNTER
PA for (Zepbound) 2.5 mg/0.5 mL SUBMITTED to [employer]     via    []CMM-KEY:   [x]Surescripts-Case ID # [id number]   []Availity-Auth ID # NDC #   []Faxed to plan   []Other website   []Phone call Case ID #     [x]PA sent as URGENT    All office notes, labs and other pertaining documents and studies sent. Clinical questions answered. Awaiting determination from insurance company.     Turnaround time for your insurance to make a decision on your Prior Authorization can take 7-21 business days.

## 2025-04-04 NOTE — LETTER
April 4, 2025     Patient: Tony Corey  YOB: 1980  Date of Visit: 4/4/2025      To Whom it May Concern:    Tony Corey is under my professional care. Tony was seen in my office on 4/4/2025.     If you have any questions or concerns, please don't hesitate to call.         Sincerely,          TRISTEN Christine        CC: No Recipients

## 2025-04-04 NOTE — TELEPHONE ENCOUNTER
Reason for call:   [x] Prior Auth  [] Other:     Caller:  [] Patient  [x] Pharmacy  Name: [facility]   Address: [address] [place] [address]   Callback Number: [phone]    Medication: Zepbound     Dose/Frequency: 2.5 mg/0.5 mL auto-injector. Inject 0.5 mL under the skin once a week for 28 days     Quantity: 2 mL     Ordering Provider:   [x] PCP/Provider -   [] Speciality/Provider -

## 2025-04-04 NOTE — TELEPHONE ENCOUNTER
I gave a ResMed S11 set at 5-15cm and gave an AirFit P10 (M) mask. I turn on pressure with mask fit. We discussed cleaning, resupply and compliance. Dao not downloaded yet but link sent,   S/N # [id number]  D/N#340

## 2025-04-08 ENCOUNTER — HOSPITAL ENCOUNTER (EMERGENCY)
Facility: HOSPITAL | Age: 45
Discharge: HOME | End: 2025-04-09
Attending: EMERGENCY MEDICINE
Payer: COMMERCIAL

## 2025-04-08 DIAGNOSIS — R51.9 HEADACHE: ICD-10-CM

## 2025-04-08 DIAGNOSIS — I10 HIGH BLOOD PRESSURE: Primary | ICD-10-CM

## 2025-04-08 LAB
ALBUMIN SERPL BCG-MCNC: 4.3 G/DL (ref 3.5–5)
ALP SERPL-CCNC: 80 U/L (ref 34–104)
ALT SERPL W P-5'-P-CCNC: 39 U/L (ref 7–52)
ANION GAP SERPL CALCULATED.3IONS-SCNC: 6 MMOL/L (ref 4–13)
AST SERPL W P-5'-P-CCNC: 42 U/L (ref 13–39)
BASOPHILS # BLD AUTO: 0.05 THOUSANDS/ÂΜL (ref 0–0.1)
BASOPHILS NFR BLD AUTO: 1 % (ref 0–1)
BILIRUB SERPL-MCNC: 0.47 MG/DL (ref 0.2–1)
BUN SERPL-MCNC: 19 MG/DL (ref 5–25)
CALCIUM SERPL-MCNC: 9.1 MG/DL (ref 8.4–10.2)
CARDIAC TROPONIN I PNL SERPL HS: 14 NG/L (ref ?–50)
CHLORIDE SERPL-SCNC: 102 MMOL/L (ref 96–108)
CO2 SERPL-SCNC: 30 MMOL/L (ref 21–32)
CREAT SERPL-MCNC: 1.38 MG/DL (ref 0.6–1.3)
EOSINOPHIL # BLD AUTO: 0.42 THOUSAND/ÂΜL (ref 0–0.61)
EOSINOPHIL NFR BLD AUTO: 5 % (ref 0–6)
ERYTHROCYTE [DISTWIDTH] IN BLOOD BY AUTOMATED COUNT: 12.2 % (ref 11.6–15.1)
GFR SERPL CREATININE-BSD FRML MDRD: 60 ML/MIN/1.73SQ M
GLUCOSE SERPL-MCNC: 114 MG/DL (ref 65–140)
HCT VFR BLD AUTO: 49.1 % (ref 36.5–49.3)
HGB BLD-MCNC: 17.4 G/DL (ref 12–17)
IMM GRANULOCYTES # BLD AUTO: 0.07 THOUSAND/UL (ref 0–0.2)
IMM GRANULOCYTES NFR BLD AUTO: 1 % (ref 0–2)
LYMPHOCYTES # BLD AUTO: 2.28 THOUSANDS/ÂΜL (ref 0.6–4.47)
LYMPHOCYTES NFR BLD AUTO: 27 % (ref 14–44)
MCH RBC QN AUTO: 33.9 PG (ref 26.8–34.3)
MCHC RBC AUTO-ENTMCNC: 35.4 G/DL (ref 31.4–37.4)
MCV RBC AUTO: 96 FL (ref 82–98)
MONOCYTES # BLD AUTO: 0.79 THOUSAND/ÂΜL (ref 0.17–1.22)
MONOCYTES NFR BLD AUTO: 9 % (ref 4–12)
NEUTROPHILS # BLD AUTO: 5.01 THOUSANDS/ÂΜL (ref 1.85–7.62)
NEUTS SEG NFR BLD AUTO: 57 % (ref 43–75)
NRBC BLD AUTO-RTO: 0 /100 WBCS
PLATELET # BLD AUTO: 189 THOUSANDS/UL (ref 149–390)
PMV BLD AUTO: 11.8 FL (ref 8.9–12.7)
POTASSIUM SERPL-SCNC: 4.1 MMOL/L (ref 3.5–5.3)
PROT SERPL-MCNC: 6.7 G/DL (ref 6.4–8.4)
RBC # BLD AUTO: 5.14 MILLION/UL (ref 3.88–5.62)
SODIUM SERPL-SCNC: 138 MMOL/L (ref 135–147)
WBC # BLD AUTO: 8.62 THOUSAND/UL (ref 4.31–10.16)

## 2025-04-08 PROCEDURE — 99284 EMERGENCY DEPT VISIT MOD MDM: CPT | Performed by: EMERGENCY MEDICINE

## 2025-04-08 PROCEDURE — 93005 ELECTROCARDIOGRAM TRACING: CPT

## 2025-04-08 PROCEDURE — 80053 COMPREHEN METABOLIC PANEL: CPT

## 2025-04-08 PROCEDURE — 83880 ASSAY OF NATRIURETIC PEPTIDE: CPT

## 2025-04-08 PROCEDURE — 36415 COLL VENOUS BLD VENIPUNCTURE: CPT

## 2025-04-08 PROCEDURE — 85025 COMPLETE CBC W/AUTO DIFF WBC: CPT

## 2025-04-08 PROCEDURE — 99284 EMERGENCY DEPT VISIT MOD MDM: CPT

## 2025-04-08 PROCEDURE — 84484 ASSAY OF TROPONIN QUANT: CPT

## 2025-04-09 VITALS
RESPIRATION RATE: 20 BRPM | HEART RATE: 72 BPM | OXYGEN SATURATION: 96 % | SYSTOLIC BLOOD PRESSURE: 145 MMHG | TEMPERATURE: 97.6 F | DIASTOLIC BLOOD PRESSURE: 63 MMHG

## 2025-04-09 LAB
2HR DELTA HS TROPONIN: 2 NG/L
ATRIAL RATE: 83 BPM
BNP SERPL-MCNC: 5 PG/ML (ref 0–100)
CARDIAC TROPONIN I PNL SERPL HS: 16 NG/L (ref ?–50)
DME PARACHUTE DELIVERY DATE ACTUAL: NORMAL
DME PARACHUTE DELIVERY DATE EXPECTED: NORMAL
DME PARACHUTE DELIVERY DATE REQUESTED: NORMAL
DME PARACHUTE ITEM DESCRIPTION: NORMAL
DME PARACHUTE ORDER STATUS: NORMAL
DME PARACHUTE SUPPLIER NAME: NORMAL
DME PARACHUTE SUPPLIER PHONE: NORMAL
P AXIS: 64 DEGREES
PR INTERVAL: 122 MS
QRS AXIS: 79 DEGREES
QRSD INTERVAL: 88 MS
QT INTERVAL: 372 MS
QTC INTERVAL: 438 MS
T WAVE AXIS: -4 DEGREES
VENTRICULAR RATE: 83 BPM

## 2025-04-09 PROCEDURE — 36415 COLL VENOUS BLD VENIPUNCTURE: CPT

## 2025-04-09 PROCEDURE — 93010 ELECTROCARDIOGRAM REPORT: CPT | Performed by: INTERNAL MEDICINE

## 2025-04-09 PROCEDURE — 84484 ASSAY OF TROPONIN QUANT: CPT

## 2025-04-09 NOTE — ED ATTENDING ATTESTATION
"I, Deni Curiel MD, saw and evaluated the patient. I have discussed the patient with the resident and agree with the resident's findings, Plan of Care, and MDM as documented in the resident's note, except where noted. All available labs and Radiology studies were reviewed.  I was present for key portions of any procedure(s) performed by the resident and I was immediately available to provide assistance.    At this point I agree with the current assessment done in the Emergency Department.  I have conducted an independent evaluation of this patient a history and physical is as follows:    44 yo male with a history of chronic back pain, hyperlipidemia, nephrolithiasis, CKD, ANN-MARIE, CHF, and iliac artery aneurysm presents to the ED for evaluation of an elevated blood pressure. The patient says he suddenly developed a \"pulsating\" headache and bilateral neck pain while eating this evening. He checked his BP and noted a systolic in the 150s-160s range. He says he normally runs in the 120s so he came to the ED to get \"checked out\". Headache and neck pain have mostly resolved but he says he still feels \"not right\". No chest pain, shortness of breath, nausea, vomiting, or diaphoresis. He denies visual disturbance. No numbness, weakness, or speech abnormality. No other specific complaints.    ROS: per resident physician note    Gen: NAD, AA&Ox3  HEENT: PERRL, EOMI, no nystagmus  Neck: supple  CV: RRR  Lungs: CTA B/L  Abdomen: soft, NT/ND  Ext: no swelling or deformity  Neuro: 5/5 strength all extremities, sensation grossly intact  Skin: no rash    ED Course  The patient is comfortable appearing with stable vital signs and a benign physical examination. BP is only mildly elevated. Symptoms are mostly resolved. Unclear etiology of earlier episode. Will check EKG, basic labs, troponin, and BNP. Disposition per workup and reassessment. Will continue to monitor in the ED.      Critical Care Time  Procedures   "

## 2025-04-09 NOTE — ED PROVIDER NOTES
Time reflects when diagnosis was documented in both MDM as applicable and the Disposition within this note       Time User Action Codes Description Comment    4/9/2025  2:06 AM Catalino Prescott [I10] High blood pressure     4/9/2025  2:06 AM Catalino Prescott Add [R51.9] Headache           ED Disposition       ED Disposition   Discharge    Condition   Stable    Date/Time   Wed Apr 9, 2025  2:05 AM    Comment   Tony Corey discharge to home/self care.                   Assessment & Plan       Medical Decision Making  Amount and/or Complexity of Data Reviewed  Labs: ordered.    45-year-old male with a history of hypertension CKD CHF iliac artery aneurysm coming in for evaluation of elevated blood pressure with pulsating headache and bilateral neck pain that occurred while he was eating today.  The patient states that he is on testosterone replacement therapy.  He is not on any blood pressure medication.  Patient states that he has not had any increased stress.  He states that he currently has no symptoms and denies chest pain shortness of breath nausea vomiting or diaphoresis here.    On examination, the patient's heart and lungs clear to auscultation abdomen soft and nontender neurologically fully intact without any peripheral motor or sensory deficit    Patient is uncomfortable and anxious about the cause of his symptoms today and cardiac workup placed including CBC CMP BNP and troponin with EKG    Initial troponin elevated with a delta of 2 at the 2-hour bronson.  BNP 5 CBC CMP normal.  EKG normal sinus rhythm    Patient discharged with recommendation follow-up with PCP and cardiologist with return precautions for any new concerning symptoms that he may have    ED Course as of 04/12/25 1031   Tue Apr 08, 2025   2233 Recent check up found to have sleep apnea put on cpap and also a iliac aneuysm. Today he worked out . Ate pizza and had pain around his head neck and felt like his blood pressure was elevated.    2237 On trt    2246  The left ventricular ejection fraction is 45-50%. Systolic function is mildly reduced. There is mild global hypokinesis       Medications - No data to display    ED Risk Strat Scores                    No data recorded                            History of Present Illness       Chief Complaint   Patient presents with    Medical Problem     Pt states he was eating when he got a pulsing headache and neck pain felt like his blood pressure emile rocketed. No hx of HTN, recently found out he has an iliac artery anuersym, denies CP & SOB       Past Medical History:   Diagnosis Date    Epilepsy (HCC)     and recurrent seizures, one time age 12    Iliac artery aneurysm (HCC)     Kidney stone       Past Surgical History:   Procedure Laterality Date    ANTERIOR CRUCIATE LIGAMENT REPAIR Right     FL RETROGRADE PYELOGRAM  5/15/2023    [place] CYSTOURETHROSCOPY W/URETERAL CATHETERIZATION Left 5/15/2023    Procedure: CYSTOSCOPY RETROGRADE PYELOGRAM WITH URETEROSCOPY, STONE EXTRACTION,  INSERTION STENT URETERAL LEFT;  Surgeon: Emanuel Capps MD;  Location: [facility];  Service: Urology      Family History   Problem Relation Age of Onset    Other Mother         liver transplant      Social History     Tobacco Use    Smoking status: Never     Passive exposure: Never    Smokeless tobacco: Never   Vaping Use    Vaping status: Never Used   Substance Use Topics    Alcohol use: Yes     Comment: socially    Drug use: Never      E-Cigarette/Vaping    E-Cigarette Use Never User       E-Cigarette/Vaping Substances    Nicotine No     THC No     CBD No     Flavoring No     Other No     Unknown No       I have reviewed and agree with the history as documented.     45-year-old male comes in for concern of elevated blood pressure        Review of Systems   Constitutional:  Negative for chills and fever.   HENT:  Negative for ear pain and sore throat.    Eyes:  Negative for pain and visual disturbance.   Respiratory:  Negative for cough  and shortness of breath.    Cardiovascular:  Negative for chest pain and palpitations.   Gastrointestinal:  Negative for abdominal pain and vomiting.   Genitourinary:  Negative for dysuria and hematuria.   Musculoskeletal:  Negative for arthralgias and back pain.   Skin:  Negative for color change and rash.   Neurological:  Negative for seizures and syncope.   All other systems reviewed and are negative.          Objective       ED Triage Vitals [04/08/25 2215]   Temperature Pulse Blood Pressure Respirations SpO2 Patient Position - Orthostatic VS   97.6 °F (36.4 °C) 87 155/95 18 98 % Lying      Temp Source Heart Rate Source BP Location FiO2 (%) Pain Score    Temporal Monitor Right arm -- --      Vitals      Date and Time Temp Pulse SpO2 Resp BP Pain Score FACES Pain Rating User   04/09/25 0115 -- 72 96 % 20 145/63 -- -- MS   04/09/25 0104 -- 74 97 % 20 145/63 -- -- RG   04/08/25 2215 97.6 °F (36.4 °C) 87 98 % 18 155/95 -- -- PT            Physical Exam  Vitals and nursing note reviewed.   Constitutional:       General: He is not in acute distress.     Appearance: He is well-developed.   HENT:      Head: Normocephalic and atraumatic.   Eyes:      Conjunctiva/sclera: Conjunctivae normal.   Cardiovascular:      Rate and Rhythm: Normal rate and regular rhythm.      Heart sounds: No murmur heard.  Pulmonary:      Effort: Pulmonary effort is normal. No respiratory distress.      Breath sounds: Normal breath sounds.   Abdominal:      Palpations: Abdomen is soft.      Tenderness: There is no abdominal tenderness.   Musculoskeletal:         General: No swelling.      Cervical back: Neck supple.   Skin:     General: Skin is warm and dry.      Capillary Refill: Capillary refill takes less than 2 seconds.   Neurological:      Mental Status: He is alert.   Psychiatric:         Mood and Affect: Mood normal.         Results Reviewed       Procedure Component Value Units Date/Time    HS Troponin I 2hr [[id number]]  (Normal)  Collected: 04/09/25 0103    Lab Status: Final result Specimen: Blood from Arm, Right Updated: 04/09/25 0134     hs TnI 2hr 16 ng/L      Delta 2hr hsTnI 2 ng/L     B-Type Natriuretic Peptide(BNP) [[id number]]  (Normal) Collected: 04/08/25 2258    Lab Status: Final result Specimen: Blood from Arm, Right Updated: 04/09/25 0001     BNP 5 pg/mL     HS Troponin 0hr (reflex protocol) [[id number]]  (Normal) Collected: 04/08/25 2258    Lab Status: Final result Specimen: Blood from Arm, Right Updated: 04/08/25 2331     hs TnI 0hr 14 ng/L     Comprehensive metabolic panel [[id number]]  (Abnormal) Collected: 04/08/25 2258    Lab Status: Final result Specimen: Blood from Arm, Right Updated: 04/08/25 2331     Sodium 138 mmol/L      Potassium 4.1 mmol/L      Chloride 102 mmol/L      CO2 30 mmol/L      ANION GAP 6 mmol/L      BUN 19 mg/dL      Creatinine 1.38 mg/dL      Glucose 114 mg/dL      Calcium 9.1 mg/dL      AST 42 U/L      ALT 39 U/L      Alkaline Phosphatase 80 U/L      Total Protein 6.7 g/dL      Albumin 4.3 g/dL      Total Bilirubin 0.47 mg/dL      eGFR 60 ml/min/1.73sq m     Narrative:      Specimen Lipemic.  National Kidney Disease Foundation guidelines for Chronic Kidney Disease (CKD):     Stage 1 with normal or high GFR (GFR > 90 mL/min/1.73 square meters)    Stage 2 Mild CKD (GFR = 60-89 mL/min/1.73 square meters)    Stage 3A Moderate CKD (GFR = 45-59 mL/min/1.73 square meters)    Stage 3B Moderate CKD (GFR = 30-44 mL/min/1.73 square meters)    Stage 4 Severe CKD (GFR = 15-29 mL/min/1.73 square meters)    Stage 5 End Stage CKD (GFR <15 mL/min/1.73 square meters)  Note: GFR calculation is accurate only with a steady state creatinine    CBC and differential [[id number]]  (Abnormal) Collected: 04/08/25 2258    Lab Status: Final result Specimen: Blood from Arm, Right Updated: 04/08/25 2320     WBC 8.62 Thousand/uL      RBC 5.14 Million/uL      Hemoglobin 17.4 g/dL      Hematocrit 49.1 %      MCV 96 fL      MCH 33.9 pg       MCHC 35.4 g/dL      RDW 12.2 %      MPV 11.8 fL      Platelets 189 Thousands/uL      nRBC 0 /100 WBCs      Segmented % 57 %      Immature Grans % 1 %      Lymphocytes % 27 %      Monocytes % 9 %      Eosinophils Relative 5 %      Basophils Relative 1 %      Absolute Neutrophils 5.01 Thousands/µL      Absolute Immature Grans 0.07 Thousand/uL      Absolute Lymphocytes 2.28 Thousands/µL      Absolute Monocytes 0.79 Thousand/µL      Eosinophils Absolute 0.42 Thousand/µL      Basophils Absolute 0.05 Thousands/µL             No orders to display       Procedures    ED Medication and Procedure Management   Prior to Admission Medications   Prescriptions Last Dose Informant Patient Reported? Taking?   tirzepatide (Zepbound) 2.5 mg/0.5 mL auto-injector  Self No No   Sig: Inject 0.5 mL (2.5 mg total) under the skin once a week for 28 days   tirzepatide (Zepbound) 2.5 mg/0.5 mL auto-injector  Self No No   Sig: Inject 0.5 mL (2.5 mg total) under the skin once a week for 28 days      Facility-Administered Medications: None     Discharge Medication List as of 4/9/2025  2:06 AM        CONTINUE these medications which have NOT CHANGED    Details   !! tirzepatide (Zepbound) 2.5 mg/0.5 mL auto-injector Inject 0.5 mL (2.5 mg total) under the skin once a week for 28 days, Starting Thu 4/3/2025, Until Thu 5/1/2025, Normal      !! tirzepatide (Zepbound) 2.5 mg/0.5 mL auto-injector Inject 0.5 mL (2.5 mg total) under the skin once a week for 28 days, Starting Thu 4/3/2025, Until Thu 5/1/2025, Normal       !! - Potential duplicate medications found. Please discuss with provider.        No discharge procedures on file.  ED SEPSIS DOCUMENTATION   Time reflects when diagnosis was documented in both MDM as applicable and the Disposition within this note       Time User Action Codes Description Comment    4/9/2025  2:06 AM Catalino Prescott Add [I10] High blood pressure     4/9/2025  2:06 AM Catalino Prescott Add [R51.9] Headache                  Asif  MD Zachary  04/12/25 8865

## 2025-04-11 ENCOUNTER — HOSPITAL ENCOUNTER (OUTPATIENT)
Dept: NON INVASIVE DIAGNOSTICS | Facility: HOSPITAL | Age: 45
Discharge: HOME | End: 2025-04-11
Payer: COMMERCIAL

## 2025-04-11 ENCOUNTER — RESULTS FOLLOW-UP (OUTPATIENT)
Dept: VASCULAR SURGERY | Facility: CLINIC | Age: 45
End: 2025-04-11

## 2025-04-11 DIAGNOSIS — R09.89 PROMINENT POPLITEAL PULSE: ICD-10-CM

## 2025-04-11 PROCEDURE — 93925 LOWER EXTREMITY STUDY: CPT

## 2025-04-11 PROCEDURE — 93925 LOWER EXTREMITY STUDY: CPT | Performed by: SURGERY

## 2025-04-11 PROCEDURE — 93922 UPR/L XTREMITY ART 2 LEVELS: CPT | Performed by: SURGERY

## 2025-04-11 PROCEDURE — 93923 UPR/LXTR ART STDY 3+ LVLS: CPT

## 2025-04-14 ENCOUNTER — TRANSCRIBE ORDERS (OUTPATIENT)
Dept: VASCULAR SURGERY | Facility: CLINIC | Age: 45
End: 2025-04-14

## 2025-04-14 DIAGNOSIS — I73.9 PAD (PERIPHERAL ARTERY DISEASE) (HCC): Primary | ICD-10-CM

## 2025-04-19 ENCOUNTER — OFFICE VISIT (OUTPATIENT)
Dept: URGENT CARE | Age: 45
End: 2025-04-19
Payer: COMMERCIAL

## 2025-04-19 VITALS
OXYGEN SATURATION: 98 % | DIASTOLIC BLOOD PRESSURE: 80 MMHG | TEMPERATURE: 98.2 F | RESPIRATION RATE: 16 BRPM | BODY MASS INDEX: 33.91 KG/M2 | HEART RATE: 89 BPM | SYSTOLIC BLOOD PRESSURE: 128 MMHG | HEIGHT: 66 IN | WEIGHT: 211 LBS

## 2025-04-19 DIAGNOSIS — L03.90 CELLULITIS, UNSPECIFIED CELLULITIS SITE: Primary | ICD-10-CM

## 2025-04-19 DIAGNOSIS — L73.9 FOLLICULITIS: ICD-10-CM

## 2025-04-19 PROCEDURE — S9083 URGENT CARE CENTER GLOBAL: HCPCS | Performed by: PHYSICIAN ASSISTANT

## 2025-04-19 PROCEDURE — G0382 LEV 3 HOSP TYPE B ED VISIT: HCPCS | Performed by: PHYSICIAN ASSISTANT

## 2025-04-19 RX ORDER — CEPHALEXIN 500 MG/1
500 CAPSULE ORAL EVERY 8 HOURS SCHEDULED
Qty: 30 CAPSULE | Refills: 0 | Status: SHIPPED | OUTPATIENT
Start: 2025-04-19 | End: 2025-04-19 | Stop reason: CLARIF

## 2025-04-19 RX ORDER — CLINDAMYCIN HYDROCHLORIDE 300 MG/1
300 CAPSULE ORAL 4 TIMES DAILY
Qty: 40 CAPSULE | Refills: 0 | Status: SHIPPED | OUTPATIENT
Start: 2025-04-19 | End: 2025-04-29

## 2025-04-19 NOTE — PROGRESS NOTES
"[facility]        NAME: Tony Corey is a 45 y.o. male  : 1980    MRN: [id number]  DATE: 2025  TIME: 1:15 PM    /80   Pulse 89   Temp 98.2 °F (36.8 °C)   Resp 16   Ht 5' 6\" (1.676 m)   Wt 95.7 kg (211 lb)   SpO2 98%   BMI 34.06 kg/m²     Assessment and Plan   Cellulitis, unspecified cellulitis site [L03.90]  1. Cellulitis, unspecified cellulitis site  clindamycin (CLEOCIN) 300 MG capsule    DISCONTINUED: cephalexin (KEFLEX) 500 mg capsule      2. Folliculitis  clindamycin (CLEOCIN) 300 MG capsule    DISCONTINUED: cephalexin (KEFLEX) 500 mg capsule            Patient Instructions       Follow up with PCP in 3-5 days.  Proceed to  ER if symptoms worsen.    Chief Complaint     Chief Complaint   Patient presents with    Rash     Patient reports area at belt line that is red and irritated. He reports that he saw a pimple and scratched it, he reports it is now red and irritated with white scab.         History of Present Illness       Pt with 3-4 days of  pimple on abdomen and beltline and now some surrounding redness minimal ache    Rash        Review of Systems   Review of Systems   Constitutional: Negative.    HENT: Negative.     Eyes: Negative.    Respiratory: Negative.     Cardiovascular: Negative.    Gastrointestinal: Negative.    Endocrine: Negative.    Genitourinary: Negative.    Musculoskeletal: Negative.    Skin:  Positive for rash.   Neurological: Negative.    Hematological: Negative.    Psychiatric/Behavioral: Negative.     All other systems reviewed and are negative.        Current Medications       Current Outpatient Medications:     clindamycin (CLEOCIN) 300 MG capsule, Take 1 capsule (300 mg total) by mouth 4 (four) times a day for 10 days, Disp: 40 capsule, Rfl: 0    tirzepatide (Zepbound) 2.5 mg/0.5 mL auto-injector, Inject 0.5 mL (2.5 mg total) under the skin once a week for 28 days (Patient not taking: Reported on 2025), Disp: 2 mL, Rfl: 0    tirzepatide " "(Zepbound) 2.5 mg/0.5 mL auto-injector, Inject 0.5 mL (2.5 mg total) under the skin once a week for 28 days (Patient not taking: Reported on 4/19/2025), Disp: 2 mL, Rfl: 0    Current Allergies     Allergies as of 04/19/2025    (No Known Allergies)            The following portions of the patient's history were reviewed and updated as appropriate: allergies, current medications, past family history, past medical history, past social history, past surgical history and problem list.     Past Medical History:   Diagnosis Date    Epilepsy (HCC)     and recurrent seizures, one time age 12    Iliac artery aneurysm (HCC)     Kidney stone        Past Surgical History:   Procedure Laterality Date    ANTERIOR CRUCIATE LIGAMENT REPAIR Right     FL RETROGRADE PYELOGRAM  5/15/2023    SC CYSTOURETHROSCOPY W/URETERAL CATHETERIZATION Left 5/15/2023    Procedure: CYSTOSCOPY RETROGRADE PYELOGRAM WITH URETEROSCOPY, STONE EXTRACTION,  INSERTION STENT URETERAL LEFT;  Surgeon: Emanuel Capps MD;  Location: [facility];  Service: Urology       Family History   Problem Relation Age of Onset    Other Mother         liver transplant         Medications have been verified.        Objective   /80   Pulse 89   Temp 98.2 °F (36.8 °C)   Resp 16   Ht 5' 6\" (1.676 m)   Wt 95.7 kg (211 lb)   SpO2 98%   BMI 34.06 kg/m²        Physical Exam     Physical Exam  Vitals and nursing note reviewed.   Constitutional:       Appearance: Normal appearance. He is normal weight.   HENT:      Head: Normocephalic and atraumatic.   Cardiovascular:      Rate and Rhythm: Normal rate and regular rhythm.      Pulses: Normal pulses.      Heart sounds: Normal heart sounds.   Pulmonary:      Effort: Pulmonary effort is normal.      Breath sounds: Normal breath sounds.   Abdominal:      Palpations: Abdomen is soft.   Musculoskeletal:         General: Normal range of motion.      Cervical back: Normal range of motion and neck supple.   Skin:     Capillary " Refill: Capillary refill takes less than 2 seconds.      Comments: Belt line  folliculitis, 2cm erythema   not fluctuant  minor ache no pain   No groin lymphadenopathy    Neurological:      Mental Status: He is alert and oriented to person, place, and time.   Psychiatric:         Behavior: Behavior normal.

## 2025-04-22 ENCOUNTER — OFFICE VISIT (OUTPATIENT)
Age: 45
End: 2025-04-22
Payer: COMMERCIAL

## 2025-04-22 VITALS
WEIGHT: 206.9 LBS | DIASTOLIC BLOOD PRESSURE: 76 MMHG | OXYGEN SATURATION: 96 % | SYSTOLIC BLOOD PRESSURE: 122 MMHG | HEIGHT: 66 IN | TEMPERATURE: 98.1 F | HEART RATE: 69 BPM | BODY MASS INDEX: 33.25 KG/M2

## 2025-04-22 DIAGNOSIS — L03.311 CELLULITIS OF ABDOMINAL WALL: Primary | ICD-10-CM

## 2025-04-22 PROCEDURE — 99213 OFFICE O/P EST LOW 20 MIN: CPT | Performed by: FAMILY MEDICINE

## 2025-04-22 RX ORDER — MUPIROCIN 20 MG/G
OINTMENT TOPICAL 3 TIMES DAILY
Qty: 15 G | Refills: 1 | Status: SHIPPED | OUTPATIENT
Start: 2025-04-22

## 2025-04-22 NOTE — PROGRESS NOTES
"Name: Tony Corey      : 1980      MRN: [id number]  Encounter Provider: Daniel Zhao DO  Encounter Date: 2025   Encounter department: [facility]  :  Assessment & Plan  Cellulitis of abdominal wall  Patient will continue with clindamycin.  Local care with Bactroban.  Prescription written for.    Orders:    mupirocin (BACTROBAN) 2 % ointment; Apply topically 3 (three) times a day           History of Present Illness   Patient is here following up on cellulitis of the abdomen.  Patient developed pimple inferior to umbilicus which got red and more painful and patient was seen in urgent care and started on clindamycin.  Patient is here for follow-up.  No fever.      Review of Systems   Constitutional: Negative.    HENT: Negative.     Eyes: Negative.    Respiratory: Negative.     Cardiovascular: Negative.    Gastrointestinal: Negative.    Endocrine: Negative.    Genitourinary: Negative.    Musculoskeletal: Negative.    Skin:  Positive for color change and wound.   Allergic/Immunologic: Negative.    Neurological: Negative.    Hematological: Negative.    Psychiatric/Behavioral: Negative.         Objective   /76 (BP Location: Right arm, Patient Position: Sitting, Cuff Size: Large)   Pulse 69   Temp 98.1 °F (36.7 °C) (Temporal)   Ht 5' 6\" (1.676 m)   Wt 93.8 kg (206 lb 14.4 oz)   SpO2 96%   BMI 33.39 kg/m²      Physical Exam  Vitals and nursing note reviewed.   Constitutional:       General: He is not in acute distress.     Appearance: Normal appearance. He is not ill-appearing, toxic-appearing or diaphoretic.   Skin:     Findings: Erythema present.      Comments: Mild redness over the lower abdomen right of midline.  No abscess noted.   Neurological:      Mental Status: He is alert.         "

## 2025-04-22 NOTE — ASSESSMENT & PLAN NOTE
Patient will continue with clindamycin.  Local care with Bactroban.  Prescription written for.    Orders:    mupirocin (BACTROBAN) 2 % ointment; Apply topically 3 (three) times a day

## 2025-05-08 ENCOUNTER — OFFICE VISIT (OUTPATIENT)
Dept: SLEEP CENTER | Facility: CLINIC | Age: 45
End: 2025-05-08
Payer: COMMERCIAL

## 2025-05-08 ENCOUNTER — TELEPHONE (OUTPATIENT)
Dept: SLEEP CENTER | Facility: CLINIC | Age: 45
End: 2025-05-08

## 2025-05-08 VITALS
SYSTOLIC BLOOD PRESSURE: 124 MMHG | OXYGEN SATURATION: 94 % | BODY MASS INDEX: 32.62 KG/M2 | HEART RATE: 72 BPM | WEIGHT: 203 LBS | HEIGHT: 66 IN | DIASTOLIC BLOOD PRESSURE: 80 MMHG

## 2025-05-08 DIAGNOSIS — G47.33 OSA (OBSTRUCTIVE SLEEP APNEA): Primary | ICD-10-CM

## 2025-05-08 DIAGNOSIS — F51.12 INSUFFICIENT SLEEP SYNDROME: ICD-10-CM

## 2025-05-08 PROCEDURE — 99213 OFFICE O/P EST LOW 20 MIN: CPT | Performed by: PSYCHIATRY & NEUROLOGY

## 2025-05-08 NOTE — PATIENT INSTRUCTIONS
"Patient Education     Sleep apnea in adults   The Basics   Written by the doctors and editors at [facility]   What is sleep apnea? -- Sleep apnea is a condition that makes you stop breathing for short periods while you are asleep. There are 2 types of sleep apnea. One is called \"obstructive sleep apnea.\" The other is called \"central sleep apnea.\"  In obstructive sleep apnea, you stop breathing because your throat narrows or closes (figure 1). In central sleep apnea, you stop breathing because your brain does not send the right signals to your muscles to make you breathe. When people talk about sleep apnea, they are usually referring to obstructive sleep apnea, which is what this article is about.  People with sleep apnea do not know that they stop breathing when they are asleep. But they do sometimes wake up startled or gasping for breath. They also often hear from loved ones that they snore.  What are the symptoms of sleep apnea? -- The main symptoms of sleep apnea are loud snoring, tiredness, and daytime sleepiness. Other symptoms can include:   Restless sleep   Waking up choking or gasping   Morning headaches, dry mouth, or sore throat   Waking up often to urinate   Waking up feeling unrested or groggy   Trouble thinking clearly or remembering things  Some people with sleep apnea don't have symptoms, or don't realize that they have them.  Should I see a doctor or nurse? -- Yes. If you think that you might have sleep apnea, see your doctor.  Is there a test for sleep apnea? -- Yes. First, your doctor or nurse will ask about your symptoms. If you have a bed partner, they might also ask that person if you snore or gasp in your sleep. If the doctor or nurse suspects that you have sleep apnea, they might send you for a \"sleep study.\"  Sleep studies can sometimes be done at home, but they are usually done in a sleep lab. For the study, you spend the night in the lab, and you are hooked up to different machines that " "monitor your heart rate, breathing, and other body functions. The results of the test tell your doctor or nurse if you have the disorder.  Is there anything I can do on my own to help my sleep apnea? -- Yes. Some things that might help:   Try to avoid sleeping on your back, if possible. This might help some people.   Lose weight, if you have excess body weight.   Get regular physical activity. This might help you lose weight. But even if it doesn't, being active is good for your health.   Avoid alcohol, especially in the evening. Alcohol can make sleep apnea worse.  How is sleep apnea treated? -- Treatment can include:   Weight loss - As mentioned above, weight loss can help if you have excess weight or obesity. But losing weight can be challenging, and it takes time to lose enough weight to help with your sleep apnea. Most people need other treatment while they work on losing weight.   CPAP - The most effective treatment for sleep apnea is a device that keeps your airway open while you sleep. Treatment with this device is called \"continuous positive airway pressure\" (\"CPAP\"). People getting CPAP wear a face mask at night that keeps them breathing (figure 2).  If your doctor or nurse recommends a CPAP machine, be patient about using it. The mask might seem uncomfortable to wear at first, and the machine might seem noisy, but using the machine can really help you. People with sleep apnea who use a CPAP machine feel more rested and generally feel better.  If CPAP does not work, your doctor might suggest other treatment. Options might include:   An oral device - This is a device that you wear in your mouth. It is called an \"oral appliance\" or \"mandibular advancement device.\" It helps keep your airway open while you sleep.   Hypoglossal nerve stimulation - This involves a procedure to implant a small device into your chest. The device has a wire that connects to the nerve under your tongue. While you are sleeping, it " sends an electrical signal that causes the tongue to push forward. This helps open up your airway.   Surgery to widen your airway - This might involve removing your tonsils or other tissue that blocks the airway.  Is sleep apnea dangerous? -- It can be. Risks include:   Accidents - People with sleep apnea do not get good-quality sleep, so they are often tired and not alert. This puts them at risk for car accidents and other types of accidents.   Other health problems - Studies show that people with sleep apnea are more likely than others to have high blood pressure, heart attacks, and other serious heart problems. Some people also have mood changes or depression.  In people with severe sleep apnea, getting treated (for example, with CPAP) can help lower these risks.  All topics are updated as new evidence becomes available and our peer review process is complete.  This topic retrieved from [employer] on: Feb 28, 2024.  Topic 31728 Version 18.0  Release: 32.2.4 - C32.58  © 2024 UpToDate, Inc. and/or its affiliates. All rights reserved.  figure 1: Airway in a person with sleep apnea     Normally, when a person sleeps, the airway remains open, and air can pass from the nose and mouth to the lungs. In a person with sleep apnea, parts of the throat and mouth drop into the airway and block off the flow of air. This can cause loud snoring and interrupt breathing for short periods.  Graphic 35285 Version 6.0  figure 2: Continuous positive airway pressure (CPAP) for sleep apnea     The CPAP mask gently blows air into your nose while you sleep. It puts just enough pressure on your airway to keep it from closing. The mask in this picture fits over just the nose. Other CPAP devices have masks that fit over the nose and mouth.  Graphic 31054 Version 5.0  Consumer Information Use and Disclaimer   Disclaimer: This generalized information is a limited summary of diagnosis, treatment, and/or medication information. It is not meant to  be comprehensive and should be used as a tool to help the user understand and/or assess potential diagnostic and treatment options. It does NOT include all information about conditions, treatments, medications, side effects, or risks that may apply to a specific patient. It is not intended to be medical advice or a substitute for the medical advice, diagnosis, or treatment of a health care provider based on the health care provider's examination and assessment of a patient's specific and unique circumstances. Patients must speak with a health care provider for complete information about their health, medical questions, and treatment options, including any risks or benefits regarding use of medications. This information does not endorse any treatments or medications as safe, effective, or approved for treating a specific patient. UpToDate, Inc. and its affiliates disclaim any warranty or liability relating to this information or the use thereof.The use of this information is governed by the Terms of Use, available at https://www.[employer].com/en/know/clinical-effectiveness-terms. 2024© UpToDate, Inc. and its affiliates and/or licensors. All rights reserved.  Copyright   © 2024 UpToDate, Inc. and/or its affiliates. All rights reserved.

## 2025-05-08 NOTE — PROGRESS NOTES
Name: Tony oCrey      : 1980      MRN: [id number]  Encounter Provider: John Jonas MD  Encounter Date: 2025   Encounter department: [facility] [place]  :  Assessment & Plan  ANN-MARIE (obstructive sleep apnea)    The patient is a 45-year old M with a 2-3 month history of ANN-MARIE.  Prior HST/PSG study notable for an AHI of 19.2 (Weight 210, Oxygen valerie 75% in 3/2025).  Has been on CPAP/BIPAP for 1-2 month with optimal compliance.  Pertinent interval medical history since the last visit is notable for losing 7 lbs since the HSAT.   The patient endorsed obtaining clinical benefit from PAP therapy with respect to decreased snoring and excessive daytime sleepiness.  The main concern for this visit was (mask fitting issues/device leaking/pressure intolerance, routine compliance without acute complaints).    Compliance data was reviewed (reported below under HPI) and looks optimal.        The PLAN includes: Continue using the CPAP machine for more than 70% of the time (> 4 hours per night to obtain clinical benefit), adjusting the mask appropriately, and getting supplies from DME company per recommended schedule.    Will re-evaluate the patient for routine compliance visit in 3-6 months.  Discussed the importance of regular cleaning of the PAP device, checking mask for appropriate seal to prevent air leaks, and let the DME or provider know if issues ensue.  Discussed avoidance of drowsy driving, pulling over if tired, and avoiding sedatives/alcohol close to bedtime.   Orders:    PAP DME Resupply/Reorder    Insufficient sleep syndrome  Chronic condition that is affecting the quality of life.  The patient is only getting 5-6 hours of sleep a night.  Discussed the importance of getting at least 7 hours of sleep a night.  Counseled the patient to sleep 15 minutes earlier gradually to extend his sleep.             History of Present Illness   HPI   The patient is a 45-year old M with a 2-3 month  history of ANN-MARIE.  Prior HST/PSG study notable for an AHI of 19.2 (Weight 210, Oxygen valerie 75% in 3/2025).  Has been on CPAP/BIPAP for 1-2 month with optimal compliance.  Pertinent interval medical history since the last visit is notable for losing 7 lbs since the HSAT.   The patient endorsed obtaining clinical benefit from PAP therapy with respect to decreased snoring and excessive daytime sleepiness.  The main concern for this visit was (mask fitting issues/device leaking/pressure intolerance, routine compliance without acute complaints).      Interval Sleep History  The patient endorsed feeling better on PAP therapy and getting clinical benefit, by feeling less sleepy during the day.  Endorsed good adherence to the machine.  The patient needs supplies.    However, the patient enclosed that he only sleeps.  The patient is on Zepbound and has lost  7 lbs.     Interval General Health History  No pertinent medical changes.     Bedtime Habits  She goes to bed between 12 PM, reads and showers before going to bed.  Falls asleep within 30 minutes, wakes up 1-2 times to go to the bathroom.  The patient is able to go back to sleep after nighttime awakenings.  Wakes up at 5 am.  Wakes up refreshed.  Denies morning headaches or dry mouth.      [place] Sleepiness Scale score is 7.  Sometimes naps. Denies urge to move her legs at night, hypnagogic/hyponapompic hallucinations, sleep paralysis, or cataplexy.      PAP-related questions  No dry mouth with PAP  No nose bleeds  + nasal congestion  No aerophagia  No mask leak, hissing sounds coming out of mask or air irritating the eyes.   No snoring with mask on  No skin irritation with the mask     Caffeine- 12 oz daily  Alcohol- rare  Smoking, marijuana, vaping, sedatives/hypnotics, or illicit drug use- no     Compliance data  Date: 3/12/25  Average use: 67%, > 4 hour  Type: APAP 5-15 cmH2O, AirSense 11  Pressure: median 6.4, 95% 8.8  Leaks: median 0.;0, 95% 6.7  Residual AHI 0.5  "          Sitting and reading: Would never doze  Watching TV: Moderate chance of dozing  Sitting, inactive in a public place (e.g. a theatre or a meeting): Would never doze  As a passenger in a car for an hour without a break: Would never doze  Lying down to rest in the afternoon when circumstances permit: High chance of dozing  Sitting and talking to someone: Would never doze  Sitting quietly after a lunch without alcohol: Moderate chance of dozing  In a car, while stopped for a few minutes in traffic: Would never doze  Total score: 7     Review of Systems   Constitutional: Negative.    HENT: Negative.     Eyes: Negative.    Respiratory:  Positive for apnea.    Cardiovascular: Negative.    Gastrointestinal: Negative.    Genitourinary: Negative.    Neurological: Negative.    Psychiatric/Behavioral:  Positive for sleep disturbance.      Pertinent positives/negatives included in HPI and also as noted:       Objective   /80 (BP Location: Right arm, Patient Position: Sitting, Cuff Size: Standard)   Pulse 72   Ht 5' 6\" (1.676 m)   Wt 92.1 kg (203 lb)   SpO2 94%   BMI 32.77 kg/m²        Physical Exam  Constitutional:       Appearance: Normal appearance. He is obese.   HENT:      Head: Normocephalic.      Comments: Stephens 4     Nose: Nose normal.   Eyes:      Extraocular Movements: Extraocular movements intact.   Cardiovascular:      Rate and Rhythm: Normal rate and regular rhythm.      Pulses: Normal pulses.      Heart sounds: Normal heart sounds.   Pulmonary:      Effort: Pulmonary effort is normal.      Breath sounds: Normal breath sounds.   Musculoskeletal:         General: Normal range of motion.      Cervical back: Normal range of motion.   Neurological:      Mental Status: He is alert.   Psychiatric:         Mood and Affect: Mood normal.         Data  Lab Results   Component Value Date    HGB 17.4 (H) 04/08/2025    HCT 49.1 04/08/2025    MCV 96 04/08/2025      Lab Results   Component Value Date    " CALCIUM 9.1 04/08/2025    K 4.1 04/08/2025    CO2 30 04/08/2025     04/08/2025    BUN 19 04/08/2025    CREATININE 1.38 (H) 04/08/2025     Lab Results   Component Value Date    FERRITIN 191 11/18/2022     Lab Results   Component Value Date    AST 42 (H) 04/08/2025    ALT 39 04/08/2025

## 2025-05-08 NOTE — ASSESSMENT & PLAN NOTE
The patient is a 45-year old M with a 2-3 month history of ANN-MARIE.  Prior HST/PSG study notable for an AHI of 19.2 (Weight 210, Oxygen valerie 75% in 3/2025).  Has been on CPAP/BIPAP for 1-2 month with optimal compliance.  Pertinent interval medical history since the last visit is notable for losing 7 lbs since the HSAT.   The patient endorsed obtaining clinical benefit from PAP therapy with respect to decreased snoring and excessive daytime sleepiness.  The main concern for this visit was (mask fitting issues/device leaking/pressure intolerance, routine compliance without acute complaints).    Compliance data was reviewed (reported below under HPI) and looks optimal.        The PLAN includes: Continue using the CPAP machine for more than 70% of the time (> 4 hours per night to obtain clinical benefit), adjusting the mask appropriately, and getting supplies from DME company per recommended schedule.    Will re-evaluate the patient for routine compliance visit in 3-6 months.  Discussed the importance of regular cleaning of the PAP device, checking mask for appropriate seal to prevent air leaks, and let the DME or provider know if issues ensue.  Discussed avoidance of drowsy driving, pulling over if tired, and avoiding sedatives/alcohol close to bedtime.   Orders:    PAP DME Resupply/Reorder

## 2025-05-09 LAB

## 2025-05-19 NOTE — PROGRESS NOTES
Consultation - Cardiology Office  [facility].    Tony Corey 45 y.o. male MRN: [id number]  : 1980  Unit/Bed#:  Encounter: [id number]        Assessment & Plan  Cardiomyopathy, unspecified type (HCC)  TTE, 2025:  EF 45-50%, normal RV systolic function  Trace MR and AR    TTE, 10/29/2016:  EF 60%    Stress test, 10/29/2016  Negative for ischemia  Pure hypercholesterolemia  2025: , , HDL 37, normal AST and ALT  On no treatment.  Patient refused to take any medications.  He is agreeable to getting his labs rechecked and then deciding    Iliac artery aneurysm (HCC)    Class 1 obesity due to excess calories without serious comorbidity with body mass index (BMI) of 32.0 to 32.9 in adult  BMI 33.41  On Zepbound  ANN-MARIE (obstructive sleep apnea)  On CPAP     ASSESSMENT:  Cardiomyopathy, unspecified type (HCC)  TTE, 2025:  EF 45-50%, normal RV systolic function  Trace MR and AR    TTE, 10/29/2016:  EF 60%    Stress test, 10/29/2016  Negative for ischemia    Pure hypercholesterolemia  2025: , , HDL 37, normal AST and ALT  On no treatment.  Patient refused to take any medications.  He is agreeable to getting his labs rechecked and then deciding    Iliac artery aneurysm (HCC)  Incidental finding    Class 1 obesity due to excess calories without serious comorbidity with body mass index (BMI) of 32.0 to 32.9 in adult  BMI 33.41  On Zepbound    ANN-MARIE (obstructive sleep apnea)  On CPAP    RECOMMENDATIONS:  Recheck lipid and hepatic function panel  Coronary calcium score  Low-cholesterol diet  Regular cardiovascular exercise  Continue weight loss  Continue using CPAP            Thank you for your consultation.  If you have any question please call me at [phone]      Primary Care Physician Requesting Consult: Daniel Zhao DO      Reason for Consult / Principal Problem: Cardiomyopathy        HPI :     Tony Corey is a 45 y.o. year old male who was referred by  primary care doctor for cardiac management.  Patient underwent baseline cardiovascular testing.  Echocardiogram showed mildly decreased EF of 45-50%.  He also has elevated LDL but declined to take any medication at the moment.  He is agreeable to rechecking his labs and then discussing medical treatment if indicated.  Besides checking his lipid and hepatic function panel, I am also going to get a stress test to rule out ischemia as a reason for his underlying mildly decreased LV systolic function.  Will also obtain a coronary calcium score for further restratification      Review of Systems  REVIEW OF SYSTEMS:      Constitutional: Negative for activity change, appetite change, chills, fatigue, fever and unexpected weight change.   HENT: Negative for congestion, sore throat and trouble swallowing.    Eyes: Negative for discharge and redness.   Respiratory: Negative for apnea, cough, chest tightness, shortness of breath and wheezing.    Cardiovascular: Negative for chest pain, shortness of breath, palpitations and leg swelling  Gastrointestinal: Negative for abdominal distention, abdominal pain, anal bleeding, blood in stool, constipation, diarrhea, nausea and vomiting.   Endocrine: Negative for polydipsia, polyphagia and polyuria.   Genitourinary: Negative for difficulty urinating, dysuria, flank pain and hematuria.   Musculoskeletal: Negative for arthralgias, myalgias and neck stiffness.   Skin: Negative for pallor and rash.   Allergic/Immunologic: Negative for environmental allergies.   Neurological: Negative for dizziness, syncope, light-headedness, numbness and headaches.   Hematological: Negative for adenopathy. Does not bruise/bleed easily.   Psychiatric/Behavioral: Negative for confusion and hallucinations. The patient is not nervous/anxious.      Historical Information   Past Medical History:   Diagnosis Date    Epilepsy (HCC)     and recurrent seizures, one time age 12    Iliac artery aneurysm (HCC)      "Kidney stone      Past Surgical History:   Procedure Laterality Date    ANTERIOR CRUCIATE LIGAMENT REPAIR Right     FL RETROGRADE PYELOGRAM  5/15/2023    MT CYSTOURETHROSCOPY W/URETERAL CATHETERIZATION Left 5/15/2023    Procedure: CYSTOSCOPY RETROGRADE PYELOGRAM WITH URETEROSCOPY, STONE EXTRACTION,  INSERTION STENT URETERAL LEFT;  Surgeon: Emanuel Capps MD;  Location: [facility];  Service: Urology     Social History     Substance and Sexual Activity   Alcohol Use Yes    Comment: socially     Social History     Substance and Sexual Activity   Drug Use Never     Tobacco Use History[1]  Family History:   Family History   Problem Relation Age of Onset    Other Mother         liver transplant       Meds/Allergies     Allergies[2]  Current Medications[3]    Vitals: Blood pressure 118/80, pulse 73, height 5' 6\" (1.676 m), weight 93.9 kg (207 lb), SpO2 96%.    Body mass index is 33.41 kg/m².  Vitals:    05/21/25 0821   Weight: 93.9 kg (207 lb)     BP Readings from Last 3 Encounters:   05/21/25 118/80   05/08/25 124/80   04/22/25 122/76       Physical Exam  PHYSICAL EXAMINATION:  Neurologic:  Alert & oriented x 3, no new focal deficits, Not in any acute distress,  Constitutional: Obese  Eyes:  Pupil equal and reacting to light, conjunctiva normal, No JVP, No LNP   HENT:  Atraumatic, oropharynx moist, Neck- normal range of motion, no tenderness,  Neck supple   Respiratory:  Bilateral air entry, mostly clear to auscultation  Cardiovascular: S1-S2 regular with a I/VI systolic murmur   GI:  Soft, nondistended, normal bowel sounds, nontender, no hepatosplenomegaly appreciated.  Musculoskeletal: no tenderness, no deformities.   Skin:  Well hydrated, no rash   Lymphatic:  No lymphadenopathy noted   Extremities:  No edema and distal pulses are present    Diagnostic Studies Review Cardio:      EKG: Normal sinus rhythm, heart rate 73/min    Cardiac testing:   See above in assessment      Imaging:  Chest X-Ray:   No Chest XR " "results available for this patient.    CT-scan of the chest:     No CTA results available for this patient.  Lab Review   Lab Results   Component Value Date    WBC 8.62 04/08/2025    HGB 17.4 (H) 04/08/2025    HCT 49.1 04/08/2025    MCV 96 04/08/2025    RDW 12.2 04/08/2025     04/08/2025     BMP:  Lab Results   Component Value Date    SODIUM 138 04/08/2025    K 4.1 04/08/2025     04/08/2025    CO2 30 04/08/2025    BUN 19 04/08/2025    CREATININE 1.38 (H) 04/08/2025    GLUC 114 04/08/2025    GLUF 110 (H) 01/13/2025    CALCIUM 9.1 04/08/2025    CORRECTEDCA 9.2 05/15/2023    EGFR 60 04/08/2025    MG 2.0 05/15/2023     LFT:  Lab Results   Component Value Date    AST 42 (H) 04/08/2025    ALT 39 04/08/2025    ALKPHOS 80 04/08/2025    TP 6.7 04/08/2025    ALB 4.3 04/08/2025      Lab Results   Component Value Date    VZE3MWULDAMI 1.659 01/13/2025     No components found for: \"TSH3\"  Lab Results   Component Value Date    HGBA1C 5.4 01/13/2025     Lipid Profile:   Lab Results   Component Value Date    CHOLESTEROL 200 (H) 01/13/2025    HDL 37 (L) 01/13/2025    LDLCALC 137 (H) 01/13/2025    TRIG 129 01/13/2025     Lab Results   Component Value Date    CHOLESTEROL 200 (H) 01/13/2025    CHOLESTEROL 202 (H) 06/04/2024     Lab Results   Component Value Date    CKTOTAL 1,331 (H) 05/14/2023     No results found for: \"NTBNP\"   Recent Results (from the past 4 weeks)   CPAP and BiLevel Resupply Package (Without F2F Documentation)    Collection Time: 05/08/25 10:23 AM   Result Value Ref Range    Supplier Name [employer]     Supplier Phone Number [phone]     Order Status Processing     Delivery Note      Delivery Request Date 05/08/2025     Item Description CPAP and BiLevel Resupply Package, Nasal Pillow     Item Description       PAP Mask, Fit to Comfort, Nasal Pillow, 1 per 3 months    Item Description Disposable PAP Filter, 2 per 1 month     Item Description Non-Disposable PAP Filter, 1 per 6 months     " "Item Description       PAP Mask Interface Cushion, Nasal Pillow, 2 per 1 month    Item Description       PAP Machine Tubing, Fit to Patient Comfort, 1 per 3 months    Item Description Humidifier Water Chamber, 1 per 6 months     Item Description PAP Headgear, 1 per 6 months            Dr. Dahiana Gusman MD, [facility]      \"This note has been constructed using a voice recognition system.Therefore there may be syntax, spelling, and/or grammatical errors. Please call if you have any questions. \"         [1]   Social History  Tobacco Use   Smoking Status Never    Passive exposure: Never   Smokeless Tobacco Never   [2] No Known Allergies  [3]   Current Outpatient Medications:     tirzepatide (Zepbound) 2.5 mg/0.5 mL auto-injector, Inject 2.5 mg under the skin once a week, Disp: , Rfl:     mupirocin (BACTROBAN) 2 % ointment, Apply topically 3 (three) times a day (Patient not taking: Reported on 5/21/2025), Disp: 15 g, Rfl: 1    "

## 2025-05-19 NOTE — ASSESSMENT & PLAN NOTE
1/13/2025: , , HDL 37, normal AST and ALT  On no treatment.  Patient refused to take any medications.  He is agreeable to getting his labs rechecked and then deciding

## 2025-05-19 NOTE — ASSESSMENT & PLAN NOTE
Wt Readings from Last 3 Encounters:   05/08/25 92.1 kg (203 lb)   04/22/25 93.8 kg (206 lb 14.4 oz)   04/19/25 95.7 kg (211 lb)

## 2025-05-21 ENCOUNTER — TELEPHONE (OUTPATIENT)
Age: 45
End: 2025-05-21

## 2025-05-21 ENCOUNTER — OFFICE VISIT (OUTPATIENT)
Dept: CARDIOLOGY CLINIC | Facility: CLINIC | Age: 45
End: 2025-05-21
Payer: COMMERCIAL

## 2025-05-21 VITALS
BODY MASS INDEX: 33.27 KG/M2 | OXYGEN SATURATION: 96 % | SYSTOLIC BLOOD PRESSURE: 118 MMHG | WEIGHT: 207 LBS | HEIGHT: 66 IN | DIASTOLIC BLOOD PRESSURE: 80 MMHG | HEART RATE: 73 BPM

## 2025-05-21 DIAGNOSIS — E66.09 CLASS 1 OBESITY DUE TO EXCESS CALORIES WITHOUT SERIOUS COMORBIDITY WITH BODY MASS INDEX (BMI) OF 32.0 TO 32.9 IN ADULT: ICD-10-CM

## 2025-05-21 DIAGNOSIS — I50.22 CHRONIC HEART FAILURE WITH MILDLY REDUCED EJECTION FRACTION (HFMREF, 41-49%) (HCC): Primary | ICD-10-CM

## 2025-05-21 DIAGNOSIS — E66.811 CLASS 1 OBESITY DUE TO EXCESS CALORIES WITHOUT SERIOUS COMORBIDITY WITH BODY MASS INDEX (BMI) OF 32.0 TO 32.9 IN ADULT: ICD-10-CM

## 2025-05-21 DIAGNOSIS — E78.00 PURE HYPERCHOLESTEROLEMIA: ICD-10-CM

## 2025-05-21 DIAGNOSIS — G47.33 OSA (OBSTRUCTIVE SLEEP APNEA): ICD-10-CM

## 2025-05-21 DIAGNOSIS — I72.3 ILIAC ARTERY ANEURYSM (HCC): ICD-10-CM

## 2025-05-21 DIAGNOSIS — I42.9 CARDIOMYOPATHY, UNSPECIFIED TYPE (HCC): ICD-10-CM

## 2025-05-21 PROCEDURE — 99243 OFF/OP CNSLTJ NEW/EST LOW 30: CPT | Performed by: INTERNAL MEDICINE

## 2025-05-21 PROCEDURE — 93000 ELECTROCARDIOGRAM COMPLETE: CPT | Performed by: INTERNAL MEDICINE

## 2025-05-21 RX ORDER — TIRZEPATIDE 2.5 MG/.5ML
2.5 INJECTION, SOLUTION SUBCUTANEOUS WEEKLY
COMMUNITY

## 2025-05-21 NOTE — TELEPHONE ENCOUNTER
Patient called and was upset by his first experience with the Cardiologist today. He asked  if Dr. Zhao can recommend him for another Cardiologist that would take his case seriously. Please advise [phone] thank you.

## 2025-05-21 NOTE — TELEPHONE ENCOUNTER
Spoke to pt re: above message.  He basically just doesn't like the doc.  I asked him to at least get the studies done that were order and once we have all that info in hand, we can refer to another doctor

## 2025-05-27 ENCOUNTER — OFFICE VISIT (OUTPATIENT)
Age: 45
End: 2025-05-27
Payer: COMMERCIAL

## 2025-05-27 VITALS
SYSTOLIC BLOOD PRESSURE: 120 MMHG | HEIGHT: 65 IN | RESPIRATION RATE: 16 BRPM | WEIGHT: 204.4 LBS | HEART RATE: 82 BPM | DIASTOLIC BLOOD PRESSURE: 80 MMHG | BODY MASS INDEX: 34.05 KG/M2

## 2025-05-27 DIAGNOSIS — I50.22 CHRONIC HEART FAILURE WITH MILDLY REDUCED EJECTION FRACTION (HFMREF, 41-49%) (HCC): ICD-10-CM

## 2025-05-27 DIAGNOSIS — R73.9 HYPERGLYCEMIA: ICD-10-CM

## 2025-05-27 DIAGNOSIS — E78.00 PURE HYPERCHOLESTEROLEMIA: ICD-10-CM

## 2025-05-27 DIAGNOSIS — E66.811 OBESITY, CLASS I, BMI 30-34.9: Primary | ICD-10-CM

## 2025-05-27 DIAGNOSIS — G47.33 OSA (OBSTRUCTIVE SLEEP APNEA): ICD-10-CM

## 2025-05-27 PROCEDURE — 99244 OFF/OP CNSLTJ NEW/EST MOD 40: CPT | Performed by: PHYSICIAN ASSISTANT

## 2025-05-27 RX ORDER — TIRZEPATIDE 5 MG/.5ML
5 INJECTION, SOLUTION SUBCUTANEOUS WEEKLY
Qty: 2 ML | Refills: 2 | Status: SHIPPED | OUTPATIENT
Start: 2025-05-27 | End: 2025-08-19

## 2025-05-27 NOTE — PROGRESS NOTES
Assessment/Plan:  Tony was seen today for consult.    Diagnoses and all orders for this visit:    Obesity, Class I, BMI 30-34.9    Chronic heart failure with mildly reduced ejection fraction (HFmrEF, 41-49%) (HCC)    ANN-MARIE (obstructive sleep apnea)    Hyperglycemia    Pure hypercholesterolemia    Other orders  -     Ambulatory Referral to Weight Management       No problem-specific Assessment & Plan notes found for this encounter.     - Discussed options of HealthyCORE-Intensive Lifestyle Intervention Program, Very Low Calorie Diet-VLCD, and Conservative Program and the role of weight loss medications.  - Explained the importance of making lifestyle changes first before starting anti-obesity medications.  - Patient should demonstrate lifestyle changes first before anti-obesity medication initiated.   - Patient is interested in pursuing Conservative Program  - Initial weight loss goal of 5-10% weight loss for improved health as studies have shown this is where we see the greatest impact on improving health and decreasing risk of obesity related conditions.  - Weight loss can improve patient's co-morbid conditions and/or prevent weight-related complications.  - Labs reviewed: As below.      General Recommendations:  Nutrition:  Eat breakfast daily.  Do not skip meals.     Food log (ie.) www.[employer].com, sparkpeople.com, loseit.com, calorieking.com, etc.    Practice mindful eating.  Be sure to set aside time to eat, eat slowly, and savor your food.    Hydration:    At least 64oz of water daily.  No sugar sweetened beverages.  No juice (eat the fruit instead).    Exercise:  Studies have shown that the ideal exercise goal is somewhere between 150 to 300 minutes of moderate intensity exercise a week.  Start with exercising 10 minutes every other day and gradually increase physical activity with a goal of at least 150 minutes of moderate intensity exercise a week, divided over at least 3 days a week.  An example of  this would be exercising 30 minutes a day, 5 days a week.  Resistance training can increase muscle mass and increase our resting metabolic rate.   FULL BODY resistance training is recommended 2-3 times a week.  Do not do this on consecutive days to allow for muscle recovery.    Aim for a bare minimum 5000 steps, even on days you do not exercise.    Monitoring:   Weigh yourself daily.  If this causes undue stress, then just weigh yourself once a week.  Weigh yourself the same time of the day with the same amount of clothing on.  Preferably this should be done after waking up, before you eat, and with no clothing or minimal clothing on.    Specific Goals:  Food log (ie.) www.[employer].com,sparkpeople.com,Carbonetworks.com,Mon[employer]izness.com,etc.   Gradually increase physical activity to a goal of 5 days per week for 30 minutes of MODERATE intensity PLUS 2 days per week of FULL BODY resistance training  Goal protein intake of 60-80 grams per day    Calorie goal:  (Provided with meal plan to follow).    Return visit:  3 months   1) Continue on Zepbound- 5mg weekly    Emphasized the importance of adherence to the medication schedule and lifestyle modifications, including diet and exercise. Provided patient with written materials on Zepbound usage, dietary plans, and exercise recommendations. Discussed the importance of regular monitoring and follow up to ensure the safety and effectiveness of the treatment.  Education provided on side effects, how to monitor and when to see medical attention.  Patient received training on self-administration of the injection. Goal of treatment is to attain a weight loss of approximately 5-10% TBW within next 3-6 months.  Goal is to achieve weight loss to improve overall health and reduce risks associated with obesity and weight related comorbidities.      I have sent Zebound to your pharmacy. The prior authorization process will been done through our prior authorization team and can take up to  3-4 weeks to process through the insurance.     - Start Zepbound 2.5 mg subcutaneously weekly. After you have taken the second pen, please give me an update, as we will likely increase the dose the next month if you are tolerating it well.    - Side effects of Zepbound include nausea, vomiting, diarrhea, or constipation. Keep an eye on your heart rate while on Zepbound. If you resting heart rate is greater than 100 beats per minutes, please notify me. If you develop severe abdominal pain, stop Zepbound and go to the emergency room, as that could be a sign of pancreatitis.     - Please notify me if you have surgery, upper endoscopy, or colonoscopy scheduled, as we typically hold Zepbound for one week prior to the procedure.     2) Nutrition RX:  - start tracking intake  - focus on protein- goal is 30 grams per meal; 90 grams per day  - focus on increasing fiber first by increasing vegetable servings per day  - do not skip breakfast and goal water intake 64 oz daily    Physical Activity RX:  - Goal is 150-200 mins of activity weekly.  Try to include at least 2 strength training sessions; increasing lean body mass will really help you to lose weight and maintain weight loss.            Total time spent reviewing chart, interviewing patient, examining patient, discussing plan, answering all questions, and documentin min.       ______________________________________________________________________        Subjective:   Chief Complaint   Patient presents with    Consult     MWM- Consult; GW-180lb; Waist- 38in; Patient has sleep apnea       HPI: Tony Corey  is a 45 y.o. male with history of ANN-MARIE, hyperlipidmia, hyperglycemia, chronic heart failure reduced ejection fraction and excess weight, here to pursue weight loss management.  Previous notes and records have been reviewed.    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  He started to develop joint pain  so he stopped the powerlifting.  He was eating healthy but noticed that he was not sleeping well so he went for evaluation and was diagnosed with sleep apnea.     Patient wants to get down to 180 lbs to help the sleep apnea.     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     Physical Activity:  Exercises 6 days per week- strength, cardio, biking, hiking     HPI  Wt Readings from Last 20 Encounters:   05/27/25 92.7 kg (204 lb 6.4 oz)   05/21/25 93.9 kg (207 lb)   05/08/25 92.1 kg (203 lb)   04/22/25 93.8 kg (206 lb 14.4 oz)   04/19/25 95.7 kg (211 lb)   04/04/25 96.2 kg (212 lb)   04/03/25 94.8 kg (209 lb)   04/01/25 94.8 kg (209 lb)   03/20/25 95.2 kg (209 lb 12.8 oz)   02/20/25 94.3 kg (208 lb)   01/21/25 95.3 kg (210 lb)   01/21/25 95.3 kg (210 lb)   01/15/25 93.4 kg (206 lb)   01/09/25 96 kg (211 lb 9.6 oz)   12/10/24 95.3 kg (210 lb)   12/03/24 95.3 kg (210 lb 3.2 oz)   09/13/24 91.2 kg (201 lb)   09/12/24 91.2 kg (201 lb)   06/04/24 91.4 kg (201 lb 6.4 oz)   03/20/24 93.4 kg (206 lb)                Hunger/Cravings:   Dining out:  Hydration:see above   Alcohol:  Smoking:  Exercise: see above  Weight Monitoring:  Sleep:  STOP-BANG Score: ANN-MARIE with CPAP  Occupation:        Past Medical History[1]  Patient denies personal and family history of  pancreatitis, thyroid cancer, MEN-2 tumors.  Denies any hx of glaucoma, seizures, kidney stones, gallstones.  Denies Hx of CAD, PAD, palpitations, arrhythmia.   Denies uncontrolled anxiety or depression, suicidal behavior or thinking , insomnia or sleep disturbance.   Past Surgical History[2]  The following portions of the patient's history were reviewed and updated as appropriate: allergies, current medications, past family history, past medical history, past social history, past surgical history, and problem  "list.    Review Of Systems:  Review of Systems   Constitutional:  Positive for fatigue.   HENT: Negative.     Eyes: Negative.    Gastrointestinal:  Negative for constipation, diarrhea and nausea.   Genitourinary: Negative.    Musculoskeletal: Negative.    Skin: Negative.    Allergic/Immunologic: Negative.    Neurological: Negative.  Negative for headaches.   Hematological: Negative.    Psychiatric/Behavioral: Negative.         Objective:  /80 (BP Location: Left arm, Patient Position: Sitting)   Pulse 82   Resp 16   Ht 5' 4.5\" (1.638 m)   Wt 92.7 kg (204 lb 6.4 oz)   BMI 34.54 kg/m²   Physical Exam  Vitals reviewed.   Constitutional:       Appearance: He is obese.   HENT:      Head: Normocephalic.      Mouth/Throat:      Mouth: Mucous membranes are moist.     Eyes:      Pupils: Pupils are equal, round, and reactive to light.       Cardiovascular:      Rate and Rhythm: Normal rate and regular rhythm.   Pulmonary:      Effort: Pulmonary effort is normal.      Breath sounds: Normal breath sounds.   Abdominal:      General: Bowel sounds are normal.      Palpations: Abdomen is soft.     Musculoskeletal:         General: Normal range of motion.      Cervical back: Normal range of motion.     Skin:     General: Skin is warm and dry.     Neurological:      General: No focal deficit present.      Mental Status: He is alert.     Psychiatric:         Mood and Affect: Mood normal.         Thought Content: Thought content normal.         Judgment: Judgment normal.       Labs and Imaging  Recent labs and imaging have been personally reviewed.  Lab Results   Component Value Date    WBC 8.62 04/08/2025    HGB 17.4 (H) 04/08/2025    HCT 49.1 04/08/2025    MCV 96 04/08/2025     04/08/2025     Lab Results   Component Value Date    SODIUM 138 04/08/2025    K 4.1 04/08/2025     04/08/2025    CO2 30 04/08/2025    AGAP 6 04/08/2025    BUN 19 04/08/2025    CREATININE 1.38 (H) 04/08/2025    GLUC 114 04/08/2025    GLUF " 110 (H) 01/13/2025    CALCIUM 9.1 04/08/2025    AST 42 (H) 04/08/2025    ALT 39 04/08/2025    ALKPHOS 80 04/08/2025    TP 6.7 04/08/2025    TBILI 0.47 04/08/2025    EGFR 60 04/08/2025     Lab Results   Component Value Date    HGBA1C 5.4 01/13/2025     Lab Results   Component Value Date    SYC5ASZUZBNA 1.659 01/13/2025     Lab Results   Component Value Date    CHOLESTEROL 200 (H) 01/13/2025     Lab Results   Component Value Date    HDL 37 (L) 01/13/2025     Lab Results   Component Value Date    TRIG 129 01/13/2025     Lab Results   Component Value Date    LDLCALC 137 (H) 01/13/2025          [1]   Past Medical History:  Diagnosis Date    Epilepsy (HCC)     and recurrent seizures, one time age 12    Iliac artery aneurysm (HCC)     Kidney stone    [2]   Past Surgical History:  Procedure Laterality Date    ANTERIOR CRUCIATE LIGAMENT REPAIR Right     FL RETROGRADE PYELOGRAM  5/15/2023    ND CYSTOURETHROSCOPY W/URETERAL CATHETERIZATION Left 5/15/2023    Procedure: CYSTOSCOPY RETROGRADE PYELOGRAM WITH URETEROSCOPY, STONE EXTRACTION,  INSERTION STENT URETERAL LEFT;  Surgeon: Emanuel Capps MD;  Location: [facility];  Service: Urology

## 2025-06-19 ENCOUNTER — HOSPITAL ENCOUNTER (OUTPATIENT)
Dept: NON INVASIVE DIAGNOSTICS | Facility: HOSPITAL | Age: 45
Discharge: HOME | End: 2025-06-19
Attending: INTERNAL MEDICINE
Payer: COMMERCIAL

## 2025-06-19 ENCOUNTER — HOSPITAL ENCOUNTER (OUTPATIENT)
Dept: RADIOLOGY | Facility: HOSPITAL | Age: 45
Discharge: HOME | End: 2025-06-19
Attending: INTERNAL MEDICINE
Payer: COMMERCIAL

## 2025-06-19 DIAGNOSIS — I42.9 CARDIOMYOPATHY, UNSPECIFIED TYPE (HCC): ICD-10-CM

## 2025-06-19 DIAGNOSIS — E78.00 PURE HYPERCHOLESTEROLEMIA: ICD-10-CM

## 2025-06-19 LAB
CHEST PAIN STATEMENT: NORMAL
CHEST PAIN STATEMENT: NORMAL
MAX DIASTOLIC BP: 82 MMHG
MAX DIASTOLIC BP: 82 MMHG
MAX HR PERCENT: 100 %
MAX HR: 173 BPM
MAX PREDICTED HEART RATE: 173 BPM
MAX PREDICTED HEART RATE: 173 BPM
PROTOCOL NAME: NORMAL
PROTOCOL NAME: NORMAL
RATE PRESSURE PRODUCT: NORMAL
REASON FOR TERMINATION: NORMAL
REASON FOR TERMINATION: NORMAL
SL CV STRESS RECOVERY BP: 124 MMHG
SL CV STRESS RECOVERY HR: 112 BPM
SL CV STRESS RECOVERY O2 SAT: 99 %
SL CV STRESS STAGE REACHED: 4
STRESS ANGINA INDEX: 0
STRESS BASELINE BP: NORMAL MMHG
STRESS BASELINE HR: 90 BPM
STRESS O2 SAT REST: 98 %
STRESS PEAK HR: 150 BPM
STRESS POST ESTIMATED WORKLOAD: 13.4 METS
STRESS POST EXERCISE DUR MIN: 12 MIN
STRESS POST EXERCISE DUR SEC: 0 SEC
STRESS POST O2 SAT PEAK: 99 %
STRESS POST PEAK BP: 130 MMHG
STRESS POST PEAK HR: 173 BPM
STRESS POST PEAK HR: 173 BPM
STRESS POST PEAK SYSTOLIC BP: 170 MMHG
STRESS POST PEAK SYSTOLIC BP: 170 MMHG
TARGET HR FORMULA: NORMAL
TARGET HR FORMULA: NORMAL
TEST INDICATION: NORMAL
TEST INDICATION: NORMAL

## 2025-06-19 PROCEDURE — 93018 CV STRESS TEST I&R ONLY: CPT | Performed by: STUDENT IN AN ORGANIZED HEALTH CARE EDUCATION/TRAINING PROGRAM

## 2025-06-19 PROCEDURE — 75571 CT HRT W/O DYE W/CA TEST: CPT

## 2025-06-19 PROCEDURE — 93017 CV STRESS TEST TRACING ONLY: CPT

## 2025-06-19 PROCEDURE — 93016 CV STRESS TEST SUPVJ ONLY: CPT | Performed by: STUDENT IN AN ORGANIZED HEALTH CARE EDUCATION/TRAINING PROGRAM

## 2025-06-20 ENCOUNTER — RESULTS FOLLOW-UP (OUTPATIENT)
Dept: CARDIOLOGY CLINIC | Facility: CLINIC | Age: 45
End: 2025-06-20

## 2025-07-01 LAB
CHEST PAIN STATEMENT: NORMAL
MAX DIASTOLIC BP: 82 MMHG
MAX PREDICTED HEART RATE: 173 BPM
PROTOCOL NAME: NORMAL
REASON FOR TERMINATION: NORMAL
STRESS POST EXERCISE DUR MIN: 12 MIN
STRESS POST EXERCISE DUR SEC: 0 SEC
STRESS POST PEAK HR: 173 BPM
STRESS POST PEAK SYSTOLIC BP: 170 MMHG
TARGET HR FORMULA: NORMAL
TEST INDICATION: NORMAL

## 2025-07-08 ENCOUNTER — APPOINTMENT (OUTPATIENT)
Dept: LAB | Age: 45
End: 2025-07-08
Payer: COMMERCIAL

## 2025-07-08 ENCOUNTER — OFFICE VISIT (OUTPATIENT)
Dept: CARDIOLOGY CLINIC | Facility: CLINIC | Age: 45
End: 2025-07-08
Payer: COMMERCIAL

## 2025-07-08 VITALS
WEIGHT: 201 LBS | HEIGHT: 64 IN | HEART RATE: 83 BPM | SYSTOLIC BLOOD PRESSURE: 108 MMHG | BODY MASS INDEX: 34.31 KG/M2 | DIASTOLIC BLOOD PRESSURE: 82 MMHG | OXYGEN SATURATION: 98 %

## 2025-07-08 DIAGNOSIS — E78.2 MIXED HYPERLIPIDEMIA: Primary | ICD-10-CM

## 2025-07-08 DIAGNOSIS — E78.00 PURE HYPERCHOLESTEROLEMIA: ICD-10-CM

## 2025-07-08 DIAGNOSIS — I50.22 CHRONIC HEART FAILURE WITH MILDLY REDUCED EJECTION FRACTION (HFMREF, 41-49%) (HCC): ICD-10-CM

## 2025-07-08 LAB
ALBUMIN SERPL BCG-MCNC: 4.2 G/DL (ref 3.5–5)
ALP SERPL-CCNC: 72 U/L (ref 34–104)
ALT SERPL W P-5'-P-CCNC: 30 U/L (ref 7–52)
AST SERPL W P-5'-P-CCNC: 32 U/L (ref 13–39)
BILIRUB DIRECT SERPL-MCNC: 0.14 MG/DL (ref 0–0.2)
BILIRUB SERPL-MCNC: 0.61 MG/DL (ref 0.2–1)
CHOLEST SERPL-MCNC: 223 MG/DL (ref ?–200)
HDLC SERPL-MCNC: 41 MG/DL
LDLC SERPL CALC-MCNC: 147 MG/DL (ref 0–100)
PROT SERPL-MCNC: 6.7 G/DL (ref 6.4–8.4)
TRIGL SERPL-MCNC: 173 MG/DL (ref ?–150)

## 2025-07-08 PROCEDURE — 99214 OFFICE O/P EST MOD 30 MIN: CPT | Performed by: INTERNAL MEDICINE

## 2025-07-08 PROCEDURE — 80076 HEPATIC FUNCTION PANEL: CPT

## 2025-07-08 PROCEDURE — 36415 COLL VENOUS BLD VENIPUNCTURE: CPT

## 2025-07-08 PROCEDURE — 80061 LIPID PANEL: CPT

## 2025-07-08 RX ORDER — ATORVASTATIN CALCIUM 10 MG/1
10 TABLET, FILM COATED ORAL DAILY
Qty: 90 TABLET | Refills: 2 | Status: SHIPPED | OUTPATIENT
Start: 2025-07-08

## 2025-07-08 RX ORDER — CHLORAL HYDRATE 500 MG
2000 CAPSULE ORAL DAILY
Qty: 180 CAPSULE | Refills: 2 | Status: SHIPPED | OUTPATIENT
Start: 2025-07-08

## 2025-07-09 NOTE — PROGRESS NOTES
Outpatient Consultation - General Cardiology   Tony Corey 45 y.o. male   MRN: [id number]  Encounter: [id number]            Physician Requesting Consult: Consults   PCP: Daniel Zhao DO      Reason for Consult / Principal Problem: HFmrEF      Assessment & Plan      HFmrEF. NYHA class I.ACC/AHA stage B  Most recent LVEF of 45 to 50% on echo with global hypokinesis.  Most recent stress test was normal  calcium score of 0  Unclear etiology at this time.  However likely secondary to combination of sleep apnea and etoh use.  Patient does endorse using testosterone, unclear if this is playing a significant role in his heart failure at this time.  LFTs within normal limits  At this time patient's blood pressure is well-controlled.  Patient is actively working on modifying his other risk factors.    Hyperlipidemia  Total cholesterol of 223, LDL of 147  Calcium score of 0, ASCVD risk score of 3.2%  Can hold off on starting statin  Encourage lifestyle modification    Plan:    At this time continue to encourage lifestyle modification  Discussed cutting back on testosterone replacement therapy  Encouraged cutting back on EtOH, and continued use of CPAP  Will repeat echocardiogram in the next 3 to 4 months to see if LVEF is improved  Given lack of any symptoms at this time we will hold off on further ischemic testing  Low threshold to obtain cardiac MRI, if no improvement in LVEF            - Ambulatory Referral to Cardiology  - Echo complete w/ contrast if indicated; Future           History of Present Illness   I had the pleasure of seeing Tony Corey who is here for initial visit/follow-up. The patient presented for an incidentally found reduced LVEF.  Patient has been on testosterone replacement therapy, and wanted to get his cardiac function checked.  His echo showed an LVEF of 45 to 50% with global hypokinesis.  Patient's prior echo from 2016 showed normal LVEF.    Patient does not have any symptoms of heart failure.   "Works out at least 3 times a week.  Primarily does weightlifting with some cardio.  Does not have any symptoms of chest pain, shortness of breath, lightheadedness or syncope    After his echo showed LVEF 45 to 50%, patient underwent testing for ANN-MARIE, was found to have significant sleep apnea, and has been started on CPAP.  Patient also is now on Zepbound for weight loss.    Overall today patient says he is doing well does not have any complaints.  Is very concerned about his reduced LVEF.      Review of Systems  Review of system was conducted and was negative except for as stated in the HPI.        Objective   Vitals: Blood pressure 108/82, pulse 83, height 5' 4\" (1.626 m), weight 91.2 kg (201 lb), SpO2 98%.      EKG:   Date: 4/8/2025  Interpretation: Normal sinus rhythm      Physical Exam  Vitals reviewed.   Constitutional:       Appearance: Normal appearance.   HENT:      Head: Normocephalic and atraumatic.   Neck:      Vascular: No JVD.     Cardiovascular:      Rate and Rhythm: Normal rate and regular rhythm.      Pulses: Normal pulses.      Heart sounds: No murmur heard.     No gallop.   Pulmonary:      Effort: Pulmonary effort is normal. No respiratory distress.      Breath sounds: No stridor. No wheezing.   Chest:      Chest wall: No tenderness.     Musculoskeletal:      Right lower leg: No edema.      Left lower leg: No edema.     Skin:     General: Skin is warm and dry.     Neurological:      General: No focal deficit present.      Mental Status: He is alert and oriented to person, place, and time.     Psychiatric:         Mood and Affect: Mood normal.         Behavior: Behavior normal.           Lab Results: I have personally reviewed pertinent lab results.    Lab Results   Component Value Date    HSTNI0 14 04/08/2025    HSTNI2 16 04/09/2025     No results found for: \"NTBNP\"  Lab Results   Component Value Date    TRIG 173 (H) 07/08/2025    HDL 41 07/08/2025    LDLCALC 147 (H) 07/08/2025     Lab Results "   Component Value Date    K 4.1 04/08/2025    CO2 30 04/08/2025     04/08/2025    BUN 19 04/08/2025    CREATININE 1.38 (H) 04/08/2025    ALT 30 07/08/2025    AST 32 07/08/2025     Lab Results   Component Value Date    WBC 8.62 04/08/2025    HGB 17.4 (H) 04/08/2025    HCT 49.1 04/08/2025    MCV 96 04/08/2025     04/08/2025     Lab Results   Component Value Date    INR 0.89 05/15/2023               Flo Hinton MD  Cardiology Fellow   PGY-6      ==========================================================================================    Epic/ Allscripts/Care Everywhere records reviewed: Yes    ** Please Note: Fluency DirectDictation voice to text software may have been used in the creation of this document. **

## 2025-07-17 ENCOUNTER — TELEPHONE (OUTPATIENT)
Age: 45
End: 2025-07-17

## 2025-07-17 DIAGNOSIS — M54.12 CERVICAL RADICULOPATHY: Primary | ICD-10-CM

## 2025-07-17 NOTE — TELEPHONE ENCOUNTER
Patient called and scheduled an appointment for next Thursday. He believes he has a pinched nerve from lifting and wanted to know if something could possibly be called in for him prior to his appointment,

## 2025-07-18 RX ORDER — DICLOFENAC SODIUM 75 MG/1
75 TABLET, DELAYED RELEASE ORAL 2 TIMES DAILY
Qty: 30 TABLET | Refills: 0 | Status: SHIPPED | OUTPATIENT
Start: 2025-07-18 | End: 2025-07-29 | Stop reason: SDUPTHER

## 2025-07-29 ENCOUNTER — TELEPHONE (OUTPATIENT)
Age: 45
End: 2025-07-29

## 2025-07-29 DIAGNOSIS — M54.12 CERVICAL RADICULOPATHY: ICD-10-CM

## 2025-07-29 RX ORDER — DICLOFENAC SODIUM 75 MG/1
75 TABLET, DELAYED RELEASE ORAL 2 TIMES DAILY
Qty: 30 TABLET | Refills: 0 | Status: SHIPPED | OUTPATIENT
Start: 2025-07-29

## (undated) DEVICE — DRESSING TELFA 2 X 3 IN STRL

## (undated) DEVICE — GUIDEWIRE STRGHT TIP 0.035 IN  SOLO PLUS

## (undated) DEVICE — BASKET SPECIMEN RETRIVAL 1.9FR 120CM

## (undated) DEVICE — GLOVE SRG BIOGEL ORTHOPEDIC 7.5

## (undated) DEVICE — PACK TUR

## (undated) DEVICE — SPECIMEN CONTAINER STERILE PEEL PACK